# Patient Record
Sex: FEMALE | Race: WHITE | Employment: OTHER | URBAN - METROPOLITAN AREA
[De-identification: names, ages, dates, MRNs, and addresses within clinical notes are randomized per-mention and may not be internally consistent; named-entity substitution may affect disease eponyms.]

---

## 2017-01-02 DIAGNOSIS — F41.1 GAD (GENERALIZED ANXIETY DISORDER): ICD-10-CM

## 2017-01-02 RX ORDER — SERTRALINE HYDROCHLORIDE 100 MG/1
TABLET, FILM COATED ORAL
Qty: 30 TAB | Refills: 0 | Status: SHIPPED | OUTPATIENT
Start: 2017-01-02 | End: 2017-01-30 | Stop reason: SDUPTHER

## 2017-01-30 DIAGNOSIS — F41.1 GAD (GENERALIZED ANXIETY DISORDER): ICD-10-CM

## 2017-01-30 RX ORDER — SERTRALINE HYDROCHLORIDE 100 MG/1
TABLET, FILM COATED ORAL
Qty: 30 TAB | Refills: 0 | Status: SHIPPED | OUTPATIENT
Start: 2017-01-30 | End: 2017-01-31 | Stop reason: SDUPTHER

## 2017-01-31 ENCOUNTER — OFFICE VISIT (OUTPATIENT)
Dept: INTERNAL MEDICINE CLINIC | Age: 62
End: 2017-01-31

## 2017-01-31 VITALS
TEMPERATURE: 99.5 F | HEART RATE: 98 BPM | OXYGEN SATURATION: 98 % | RESPIRATION RATE: 18 BRPM | SYSTOLIC BLOOD PRESSURE: 132 MMHG | WEIGHT: 182.7 LBS | DIASTOLIC BLOOD PRESSURE: 84 MMHG | BODY MASS INDEX: 29.36 KG/M2 | HEIGHT: 66 IN

## 2017-01-31 DIAGNOSIS — R73.03 PREDIABETES: ICD-10-CM

## 2017-01-31 DIAGNOSIS — E78.00 HYPERCHOLESTEREMIA: ICD-10-CM

## 2017-01-31 DIAGNOSIS — F17.200 TOBACCO DEPENDENCE: ICD-10-CM

## 2017-01-31 DIAGNOSIS — F41.1 GAD (GENERALIZED ANXIETY DISORDER): Primary | ICD-10-CM

## 2017-01-31 DIAGNOSIS — R43.2 ABNORMAL TASTE IN MOUTH: ICD-10-CM

## 2017-01-31 LAB — HBA1C MFR BLD HPLC: 6.1 %

## 2017-01-31 RX ORDER — SERTRALINE HYDROCHLORIDE 100 MG/1
TABLET, FILM COATED ORAL
Qty: 90 TAB | Refills: 1 | Status: SHIPPED | OUTPATIENT
Start: 2017-01-31 | End: 2017-04-20 | Stop reason: SDUPTHER

## 2017-01-31 RX ORDER — BISMUTH SUBSALICYLATE 262 MG/1
2 TABLET, CHEWABLE ORAL
COMMUNITY
End: 2017-04-20

## 2017-01-31 NOTE — MR AVS SNAPSHOT
Visit Information Date & Time Provider Department Dept. Phone Encounter #  
 1/31/2017  9:40 AM Sari Casey MD [place] [address] [facility] [phone] [id number] Follow-up Instructions Return in about 6 months (around 7/31/2017) for Annual Physical with new provider. Upcoming Health Maintenance Date Due INFLUENZA AGE 9 TO ADULT 7/1/2017* BREAST CANCER SCRN MAMMOGRAM 8/5/2017 PAP AKA CERVICAL CYTOLOGY 10/15/2018 DTaP/Tdap/Td series (2 - Td) 1/26/2025 COLONOSCOPY 6/5/2025 *Topic was postponed. The date shown is not the original due date. Allergies as of 1/31/2017  Review Complete On: 1/31/2017 By: Yenny Lr No Known Allergies Current Immunizations  Reviewed on 8/22/2016 Name Date Influenza Vaccine (Quad) PF 10/15/2015  9:38 AM  
 Influenza Vaccine PF 10/13/2014, 12/2/2013 12:31 PM  
 Influenza Vaccine Whole 9/8/2009 Pneumococcal Polysaccharide (PPSV-23) 8/22/2016 11:20 AM  
 Tdap 1/26/2015 Zoster Vaccine, Live 4/1/2015 Not reviewed this visit You Were Diagnosed With   
  
 Codes Comments ENIO (generalized anxiety disorder)    -  Primary ICD-10-CM: F41.1 ICD-9-CM: 300.02 Tobacco dependence     ICD-10-CM: D83.150 ICD-9-CM: 305.1 Hypercholesteremia     ICD-10-CM: E78.00 ICD-9-CM: 272.0 Prediabetes     ICD-10-CM: R73.03 
ICD-9-CM: 790.29 Vitals BP Pulse Temp Resp Height(growth percentile) Weight(growth percentile) 132/84 (BP 1 Location: Right arm, BP Patient Position: Sitting) 98 99.5 °F (37.5 °C) (Oral) 18 5' 6\" (1.676 m) 182 lb 11.2 oz (82.9 kg) SpO2 BMI OB Status Smoking Status 98% 29.49 kg/m2 Hysterectomy Current Every Day Smoker Vitals History BMI and BSA Data Body Mass Index Body Surface Area  
 29.49 kg/m 2 1.96 m 2 Preferred Pharmacy Pharmacy Name Phone [phone]  [phone] Your Updated Medication List  
  
   
This list is accurate as of: 1/31/17 11:10 AM.  Always use your most recent med list.  
  
  
  
  
 bismuth subsalicylate 874 mg Chew Commonly known as:  PEPTO-BISMOL Take 2 Tabs by mouth every three (3) hours as needed. fluticasone 50 mcg/actuation nasal spray Commonly known as:  Larson Blizzard 2 Sprays by Both Nostrils route daily. LORazepam 0.5 mg tablet Commonly known as:  ATIVAN Take 1/2 tablet nightly as needed for sleep  
  
 montelukast 10 mg tablet Commonly known as:  SINGULAIR Take 1 Tab by mouth daily. sertraline 100 mg tablet Commonly known as:  ZOLOFT  
TAKE ONE TABLET DAILY. ZANTAC 300 mg tablet Generic drug:  raNITIdine Take 300 mg by mouth daily. Prescriptions Sent to Pharmacy Refills  
 sertraline (ZOLOFT) 100 mg tablet 1 Sig: TAKE ONE TABLET DAILY. Class: Normal  
 Pharmacy: [facility] #: [phone] We Performed the Following AMB POC HEMOGLOBIN A1C [42968 CPT(R)] Follow-up Instructions Return in about 6 months (around 7/31/2017) for Annual Physical with new provider. To-Do List   
 04/25/2017 Lab:  LIPID PANEL   
  
 04/25/2017 Lab:  METABOLIC PANEL, COMPREHENSIVE [facility] HE[facility]! Dear [facility] Records: Thank you for requesting a [employer] account. Our records indicate that you already have an active [employer] account. You can access your account anytime at [email or web address] [email or web address] Did you know that you can access your hospital and ER discharge instructions at any time in [employer]? You can also review all of your test results from your hospital stay or ER visit. Additional Information If you have questions, please visit the Frequently Asked Questions section of the [employer] website at [email or web address] Remember, [email or web address] is NOT to be used for urgent needs. For medical emergencies, dial 911. Now available from your iPhone and Android! Please provide this summary of care documentation to your next provider. Your primary care clinician is listed as Tahira Xiao. If you have any questions after today's visit, please call [phone].

## 2017-01-31 NOTE — PROGRESS NOTES
HPI:  Boris Gordon is a 57y.o. year old female who returns to clinic today for routine follow up appointment to discuss the issues below: Anxiety - Adherent to Zoloft. Takes Ativan 1/2 tab appx once a week to help with sleep and anxiousness related to family stressors. Reports generally anxiety is well controlled. Side effects - concerned with weight gain. Prediabetes and hyperlipidemia - hasn't been watching diet very closely but does eat generally healthy foods. She is swimming regularly for exercise. Concerns - reports a sour taste in her mouth by the end of day. Diet changes have not helped, pepto bismal has. Also tried Zantac and two weeks of Prilosec with no improvement. Denies epigastric discomfort. Endorses post nasal drip. Tobacco use - appx 1/2 ppd. Prior to Admission medications    Medication Sig Start Date End Date Taking? Authorizing Provider   bismuth subsalicylate (PEPTO-BISMOL) 262 mg chew Take 2 Tabs by mouth every three (3) hours as needed. Yes Historical Provider   sertraline (ZOLOFT) 100 mg tablet TAKE ONE TABLET DAILY. 1/30/17  Yes [address] [place] [address], MD   montelukast (SINGULAIR) 10 mg tablet Take 1 Tab by mouth daily. 9/26/16  Yes DARON Meza   LORazepam (ATIVAN) 0.5 mg tablet Take 1/2 tablet nightly as needed for sleep 9/26/16  Yes DARON Meza   fluticasone (FLONASE) 50 mcg/actuation nasal spray 2 Sprays by Both Nostrils route daily. 8/22/16  Yes DARON Meza          No Known Allergies        Review of Systems   Constitutional: Negative for chills, fever and malaise/fatigue. HENT: Negative for congestion. Respiratory: Negative for cough, shortness of breath and wheezing. Cardiovascular: Negative for chest pain, palpitations and leg swelling. Gastrointestinal: Negative for abdominal pain, blood in stool and heartburn. Musculoskeletal: Negative for falls, joint pain and myalgias.    Neurological: Negative for dizziness and headaches. Physical Exam   Constitutional: She appears well-nourished. Neck: Carotid bruit is not present. Cardiovascular: Normal rate, regular rhythm and normal heart sounds. No murmur heard. Pulses:       Carotid pulses are 2+ on the right side, and 2+ on the left side. Pulmonary/Chest: Effort normal and breath sounds normal.   Abdominal: Soft. Bowel sounds are normal. There is no hepatosplenomegaly. There is no tenderness. Musculoskeletal: She exhibits no edema. Psychiatric: She has a normal mood and affect. Her behavior is normal.         Visit Vitals    /84 (BP 1 Location: Right arm, BP Patient Position: Sitting)    Pulse 98    Temp 99.5 °F (37.5 °C) (Oral)    Resp 18    Ht 5' 6\" (1.676 m)    Wt 182 lb 11.2 oz (82.9 kg)    SpO2 98%    BMI 29.49 kg/m2         Assessment & Plan:  Boris Gordon was seen today for blood sugar problem, heartburn and cyst.    Diagnoses and all orders for this visit:    ENIO (generalized anxiety disorder)  I evaluated and recommended to continue current doses of medications.    -     sertraline (ZOLOFT) 100 mg tablet; TAKE ONE TABLET DAILY. Tobacco dependence  The patient was counseled on the dangers of tobacco use, and was advised to quit. Reviewed strategies to maximize success, including stress management and support of family/friends. Hypercholesteremia  Discussed historically elevated cholesterol and the increased risk being a tobacco user poses on risk of heart disease and stroke. I recommend initiation of statin therapy, she agrees. Will recheck her fasting lipids in 12 weeks. -     LIPID PANEL; Future  -     METABOLIC PANEL, COMPREHENSIVE; Future    Prediabetes  -     AMB POC HEMOGLOBIN A1C    Abnormal taste in mouth  Silent reflux vs post nasal drip. I recommend another trial of PPI - Nexium 20 mg daily 30 min prior to breakfast.  If no improvement in 4 weeks, will target post nasal drip.        Follow-up Disposition:  Return in about 6 months (around 7/31/2017) for Annual Physical with new provider. Advised her to call back or return to office if symptoms worsen/change/persist.  Discussed expected course/resolution/complications of diagnosis in detail with patient. Medication risks/benefits/costs/interactions/alternatives discussed with patient. She was given an after visit summary which includes diagnoses, current medications, & vitals. She expressed understanding with the diagnosis and plan.

## 2017-01-31 NOTE — PROGRESS NOTES
Megha Yepez is a 57 y.o. female  Chief Complaint   Patient presents with    Blood sugar problem    Heartburn     Sour stomach    Cyst     right axilla X3 yr     1. Have you been to the ER, urgent care clinic since your last visit? Hospitalized since your last visit? No    2. Have you seen or consulted any other health care providers outside of the [address] [place] [address] since your last visit? Include any pap smears or colon screening.  No

## 2017-02-08 ENCOUNTER — TELEPHONE (OUTPATIENT)
Dept: INTERNAL MEDICINE CLINIC | Age: 62
End: 2017-02-08

## 2017-02-08 NOTE — TELEPHONE ENCOUNTER
----- Message from Nciole Hearn sent at 2/8/2017  1:23 PM EST -----  Regarding: Dr. Bradley Emanuel  Pt states she was expecting a rx for a Statin to be called in to [address] [place] [address] on 1-31-17 ( [phone]). She is not sure of name of the medication. She is requesting a call today. She can be reached at  [phone].

## 2017-02-09 RX ORDER — ATORVASTATIN CALCIUM 20 MG/1
20 TABLET, FILM COATED ORAL DAILY
Qty: 90 TAB | Refills: 0 | Status: SHIPPED | OUTPATIENT
Start: 2017-02-09 | End: 2017-05-12 | Stop reason: SDUPTHER

## 2017-02-09 NOTE — TELEPHONE ENCOUNTER
Spoke to pt - she was advised of Dr. WITT's message and verbalized understanding. Pt was also made aware of needing to have her labs done.

## 2017-02-09 NOTE — TELEPHONE ENCOUNTER
I do not see a \"statin\" documented in the chart from the pt's previous appointment. Please advise.

## 2017-02-09 NOTE — TELEPHONE ENCOUNTER
She's right, I lapsed and did not send it in. Please apologize for me. I sent it in to [address] today. Check labs in 12 weeks (already ordered).

## 2017-04-20 ENCOUNTER — OFFICE VISIT (OUTPATIENT)
Dept: INTERNAL MEDICINE CLINIC | Age: 62
End: 2017-04-20

## 2017-04-20 VITALS
OXYGEN SATURATION: 97 % | HEIGHT: 66 IN | TEMPERATURE: 98.9 F | BODY MASS INDEX: 29.41 KG/M2 | WEIGHT: 183 LBS | RESPIRATION RATE: 16 BRPM | HEART RATE: 73 BPM

## 2017-04-20 DIAGNOSIS — R73.01 IFG (IMPAIRED FASTING GLUCOSE): ICD-10-CM

## 2017-04-20 DIAGNOSIS — Z12.39 BREAST CANCER SCREENING: ICD-10-CM

## 2017-04-20 DIAGNOSIS — E78.00 HYPERCHOLESTEREMIA: ICD-10-CM

## 2017-04-20 DIAGNOSIS — R22.31 AXILLARY LUMP, RIGHT: Primary | ICD-10-CM

## 2017-04-20 DIAGNOSIS — F41.1 GAD (GENERALIZED ANXIETY DISORDER): ICD-10-CM

## 2017-04-20 RX ORDER — SERTRALINE HYDROCHLORIDE 100 MG/1
150 TABLET, FILM COATED ORAL DAILY
Qty: 135 TAB | Refills: 1 | Status: SHIPPED | OUTPATIENT
Start: 2017-04-20 | End: 2018-02-15 | Stop reason: SDUPTHER

## 2017-04-20 RX ORDER — MELATONIN
2000 2 TIMES DAILY
COMMUNITY

## 2017-04-20 RX ORDER — LORAZEPAM 0.5 MG/1
TABLET ORAL
Qty: 30 TAB | Refills: 0 | Status: SHIPPED | OUTPATIENT
Start: 2017-04-20 | End: 2018-03-02

## 2017-04-20 RX ORDER — HYDROGEN PEROXIDE 3 %
SOLUTION, NON-ORAL MISCELLANEOUS DAILY
COMMUNITY
End: 2018-03-02

## 2017-04-20 NOTE — PATIENT INSTRUCTIONS

## 2017-04-20 NOTE — MR AVS SNAPSHOT
Visit Information Date & Time Provider Department Dept. Phone Encounter #  
 4/20/2017  8:40 AM Luis Antonio Ortiz MD [place] [address] [facility] [phone] [id number] Follow-up Instructions Return for Keep Appt in June with Dr. Arvind Muir. Your Appointments 6/30/2017 10:00 AM  
New Patient with Anuradha Aguilar MD  
[facility] Kellyselene Cook) Appt Note: to establish  
 [address] [facility] [address]  
[id number] [address] Upcoming Health Maintenance Date Due  
 BREAST CANCER SCRN MAMMOGRAM 8/5/2017 INFLUENZA AGE 9 TO ADULT 7/1/2017* PAP AKA CERVICAL CYTOLOGY 10/15/2018 DTaP/Tdap/Td series (2 - Td) 1/26/2025 COLONOSCOPY 6/5/2025 *Topic was postponed. The date shown is not the original due date. Allergies as of 4/20/2017  Review Complete On: 4/20/2017 By: Luis Antonio Ortiz MD  
 No Known Allergies Current Immunizations  Reviewed on 8/22/2016 Name Date Influenza Vaccine (Quad) PF 10/15/2015  9:38 AM  
 Influenza Vaccine PF 10/13/2014, 12/2/2013 12:31 PM  
 Influenza Vaccine Whole 9/8/2009 Pneumococcal Polysaccharide (PPSV-23) 8/22/2016 11:20 AM  
 Tdap 1/26/2015 Zoster Vaccine, Live 4/1/2015 Not reviewed this visit You Were Diagnosed With   
  
 Codes Comments Axillary lump, right    -  Primary ICD-10-CM: R22.31 
ICD-9-CM: 782.2 ENIO (generalized anxiety disorder)     ICD-10-CM: F41.1 ICD-9-CM: 300.02 Hypercholesteremia     ICD-10-CM: E78.00 ICD-9-CM: 272.0 IFG (impaired fasting glucose)     ICD-10-CM: R73.01 
ICD-9-CM: 790.21 Breast cancer screening     ICD-10-CM: Z12.39 
ICD-9-CM: V76.10 Vitals Pulse Temp Resp Height(growth percentile) Weight(growth percentile) SpO2  
 73 98.9 °F (37.2 °C) (Oral) 16 5' 6\" (1.676 m) 183 lb (83 kg) 97% BMI OB Status Smoking Status 29.54 kg/m2 Hysterectomy Current Every Day Smoker Vitals History BMI and BSA Data Body Mass Index Body Surface Area  
 29.54 kg/m 2 1.97 m 2 Preferred Pharmacy Pharmacy Name Phone [facility] [address] [facility] [phone] Your Updated Medication List  
  
   
This list is accurate as of: 4/20/17  9:11 AM.  Always use your most recent med list.  
  
  
  
  
 atorvastatin 20 mg tablet Commonly known as:  LIPITOR Take 1 Tab by mouth daily. KRILL OIL PO Take  by mouth. LORazepam 0.5 mg tablet Commonly known as:  ATIVAN Take 1/2 tablet nightly as needed for sleep  
  
 montelukast 10 mg tablet Commonly known as:  SINGULAIR Take 1 Tab by mouth daily. NexIUM 20 mg capsule Generic drug:  esomeprazole Take  by mouth daily. sertraline 100 mg tablet Commonly known as:  ZOLOFT Take 1.5 Tabs by mouth daily. VITAMIN D3 1,000 unit tablet Generic drug:  cholecalciferol Take  by mouth two (2) times a day. Prescriptions Printed Refills LORazepam (ATIVAN) 0.5 mg tablet 0 Sig: Take 1/2 tablet nightly as needed for sleep Class: Print Prescriptions Sent to Pharmacy Refills  
 sertraline (ZOLOFT) 100 mg tablet 1 Sig: Take 1.5 Tabs by mouth daily. Class: Normal  
 Pharmacy: [facility] [address] [facility] Ph #: [phone] Route: Oral  
  
We Performed the Following HEMOGLOBIN A1C WITH EAG [18785 CPT(R)] Follow-up Instructions Return for Keep Appt in June with Dr. Rigo Pires. To-Do List   
 04/21/2017 Imaging:  [facility] MAMMO BI SCREENING INCL CAD Patient Instructions Anxiety Disorder: Care Instructions Your Care Instructions Anxiety is a normal reaction to stress. Difficult situations can cause you to have symptoms such as sweaty palms and a nervous feeling. In an anxiety disorder, the symptoms are far more severe. Constant worry, muscle tension, trouble sleeping, nausea and diarrhea, and other symptoms can make normal daily activities difficult or impossible. These symptoms may occur for no reason, and they can affect your work, school, or social life. Medicines, counseling, and self-care can all help. Follow-up care is a key part of your treatment and safety. Be sure to make and go to all appointments, and call your doctor if you are having problems. It's also a good idea to know your test results and keep a list of the medicines you take. How can you care for yourself at home? · Take medicines exactly as directed. Call your doctor if you think you are having a problem with your medicine. · Go to your counseling sessions and follow-up appointments. · Recognize and accept your anxiety. Then, when you are in a situation that makes you anxious, say to yourself, \"This is not an emergency. I feel uncomfortable, but I am not in danger. I can keep going even if I feel anxious. \" · Be kind to your body: ¨ Relieve tension with exercise or a massage. ¨ Get enough rest. 
¨ Avoid alcohol, caffeine, nicotine, and illegal drugs. They can increase your anxiety level and cause sleep problems. ¨ Learn and do relaxation techniques. See below for more about these techniques. · Engage your mind. Get out and do something you enjoy. Go to a funny movie, or take a walk or hike. Plan your day. Having too much or too little to do can make you anxious. · Keep a record of your symptoms. Discuss your fears with a good friend or family member, or join a support group for people with similar problems. Talking to others sometimes relieves stress. · Get involved in social groups, or volunteer to help others. Being alone sometimes makes things seem worse than they are.  
· Get at least 30 minutes of exercise on most days of the week to relieve stress. Walking is a good choice. You also may want to do other activities, such as running, swimming, cycling, or playing tennis or team sports. Relaxation techniques Do relaxation exercises 10 to 20 minutes a day. You can play soothing, relaxing music while you do them, if you wish. · Tell others in your house that you are going to do your relaxation exercises. Ask them not to disturb you. · Find a comfortable place, away from all distractions and noise. · Lie down on your back, or sit with your back straight. · Focus on your breathing. Make it slow and steady. · Breathe in through your nose. Breathe out through either your nose or mouth. · Breathe deeply, filling up the area between your navel and your rib cage. Breathe so that your belly goes up and down. · Do not hold your breath. · Breathe like this for 5 to 10 minutes. Notice the feeling of calmness throughout your whole body. As you continue to breathe slowly and deeply, relax by doing the following for another 5 to 10 minutes: · Tighten and relax each muscle group in your body. You can begin at your toes and work your way up to your head. · Imagine your muscle groups relaxing and becoming heavy. · Empty your mind of all thoughts. · Let yourself relax more and more deeply. · Become aware of the state of calmness that surrounds you. · When your relaxation time is over, you can bring yourself back to alertness by moving your fingers and toes and then your hands and feet and then stretching and moving your entire body. Sometimes people fall asleep during relaxation, but they usually wake up shortly afterward. · Always give yourself time to return to full alertness before you drive a car or do anything that might cause an accident if you are not fully alert. Never play a relaxation tape while you drive a car. When should you call for help? Call 911 anytime you think you may need emergency care. For example, call if: · You feel you cannot stop from hurting yourself or someone else. Keep the numbers for these national suicide hotlines: 8-779-684-TALK (6-793.922.2315) and 0-495-VDODYPT (5-245.416.4287). If you or someone you know talks about suicide or feeling hopeless, get help right away. Watch closely for changes in your health, and be sure to contact your doctor if: 
· You have anxiety or fear that affects your life. · You have symptoms of anxiety that are new or different from those you had before. Where can you learn more? Go to http://zaid-ruddy.info/. Enter P754 in the search box to learn more about \"Anxiety Disorder: Care Instructions. \" Current as of: July 26, 2016 Content Version: 11.2 © 8285-2638 [employer]. Care instructions adapted under license by [employer] (which disclaims liability or warranty for this information). If you have questions about a medical condition or this instruction, always ask your healthcare professional. [address] any warranty or liability for your use of this information. Introducing [facility] & HEALTH SERVICES! Dear [facility] Thank you for requesting a [employer] account. Our records indicate that you already have an active [employer] account. You can access your account anytime at [email or web address] Did you know that you can access your hospital and ER discharge instructions at any time in [employer]? You can also review all of your test results from your hospital stay or ER visit. Additional Information If you have questions, please visit the Frequently Asked Questions section of the [employer] website at [email or web address] Remember, [employer] is NOT to be used for urgent needs. For medical emergencies, dial 911. Now available from your iPhone and Android! Please provide this summary of care documentation to your next provider. Your primary care clinician is listed as Ijeoma Kaba. If you have any questions after today's visit, please call [phone].

## 2017-04-20 NOTE — PROGRESS NOTES
Chief Complaint   Patient presents with    Medication Evaluation     Reviewed record in preparation for visit and have obtained necessary documentation. Identified pt with two pt identifiers(name and ). Health Maintenance Due   Topic    BREAST CANCER SCRN MAMMOGRAM          Chief Complaint   Patient presents with    Medication Evaluation        Wt Readings from Last 3 Encounters:   17 183 lb (83 kg)   17 182 lb 11.2 oz (82.9 kg)   16 171 lb (77.6 kg)     Temp Readings from Last 3 Encounters:   17 98.9 °F (37.2 °C) (Oral)   17 99.5 °F (37.5 °C) (Oral)   16 98.9 °F (37.2 °C) (Oral)     BP Readings from Last 3 Encounters:   17 120/70   17 132/84   16 126/80     Pulse Readings from Last 3 Encounters:   17 73   17 98   16 85           Learning Assessment:  :     Learning Assessment 2015   PRIMARY LEARNER Patient Patient   HIGHEST LEVEL OF EDUCATION - PRIMARY LEARNER  4 YEARS OF COLLEGE 4 YEARS OF COLLEGE   BARRIERS PRIMARY LEARNER NONE NONE   CO-LEARNER CAREGIVER No No   PRIMARY LANGUAGE ENGLISH ENGLISH    NEED No No   LEARNER PREFERENCE PRIMARY LISTENING DEMONSTRATION     DEMONSTRATION PICTURES   LEARNING SPECIAL TOPICS no no   ANSWERED BY patient patient   RELATIONSHIP SELF SELF       Depression Screening:  :     No flowsheet data found. Fall Risk Assessment:  :     No flowsheet data found. Abuse Screening:  :     Abuse Screening Questionnaire 10/15/2015 2014   Do you ever feel afraid of your partner? N N   Are you in a relationship with someone who physically or mentally threatens you? N N   Is it safe for you to go home?  Y Y       Coordination of Care Questionnaire:  :     1) Have you been to an emergency room, urgent care clinic since your last visit? no   Hospitalized since your last visit? no             2) Have you seen or consulted any other health care providers outside of [facility] since your last visit? no  (Include any pap smears or colon screenings in this section.)    3) Do you have an Advance Directive on file? yes    4) Are you interested in receiving information on Advance Directives? NO      Patient is accompanied by self I have received verbal consent from Dexter Roach to discuss any/all medical information while they are present in the room. Reviewed record  In preparation for visit and have obtained necessary documentation.

## 2017-04-20 NOTE — PROGRESS NOTES
HPI:  Zully Barba is a 57y.o. year old female who returns to clinic today for an acute visit to discuss the problem(s) below:    1. Continues to note a lymph node in right axilla. US of axilla done in July 2014 w/ her mammogram did not show any abnormalities. She denies any enlargement, pain or change in self breast exam.    2.  She notes Zoloft was losing its effect. One week ago she upped her dose to 1 1/2 (150 mg) daily. She has not increased Ativan use (1/2 tab 0-3 nights per week) - requests refill. Changed to sertraline in Jan 2015, several years prior on different SSRIs. She feels overall she does well on this drug. 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 3.  HLD. Started statin after Jan visit. Has tolerated well w/o side effects. Prior to Admission medications    Medication Sig Start Date End Date Taking? Authorizing Provider   esomeprazole (NEXIUM) 20 mg capsule Take  by mouth daily. Yes Historical Provider   KRILL OIL PO Take  by mouth. Yes Historical Provider   cholecalciferol (VITAMIN D3) 1,000 unit tablet Take  by mouth two (2) times a day. Yes Historical Provider   atorvastatin (LIPITOR) 20 mg tablet Take 1 Tab by mouth daily. 2/9/17  Yes Luis Antonio Ortiz MD   sertraline (ZOLOFT) 100 mg tablet TAKE ONE TABLET DAILY. 1/31/17  Yes Luis Antonio Ortiz MD   LORazepam (ATIVAN) 0.5 mg tablet Take 1/2 tablet nightly as needed for sleep 9/26/16  Yes DARON Momin   montelukast (SINGULAIR) 10 mg tablet Take 1 Tab by mouth daily. 9/26/16   DARON Clifton          No Known Allergies        ROS  See HPI      Physical Exam   Constitutional: She appears well-developed and well-nourished. No distress. Lymphadenopathy:     She has no axillary adenopathy. Right axilla was palpated and no abnormalities were appreciated. Psychiatric: She has a normal mood and affect.  Her behavior is normal.         Visit Vitals    Pulse 73    Temp 98.9 °F (37.2 °C) (Oral)    Resp 16    Ht 5' 6\" (1.676 m)    Wt 183 lb (83 kg)    SpO2 97%    BMI 29.54 kg/m2         Assessment & Plan:  Myrna Rick was seen today for medication evaluation. Diagnoses and all orders for this visit:    Axillary lump, right  No abnormality appreciated on exam.  I encouraged her to have her mammogram done. ENIO (generalized anxiety disorder)  Continue increased dose of Zoloft of 150 mg. Renewed Ativan for infrequent prn use. -     sertraline (ZOLOFT) 100 mg tablet; Take 1.5 Tabs by mouth daily.  -     LORazepam (ATIVAN) 0.5 mg tablet; Take 1/2 tablet nightly as needed for sleep    Hypercholesteremia  I evaluated and recommended to continue current doses of medications pending lab work. Have fasting lipids drawn today. IFG (impaired fasting glucose)  -     HEMOGLOBIN A1C WITH EAG    Breast cancer screening  -     [facility]UI MAMMO BI SCREENING INCL CAD; Future         Follow-up Disposition:  Return for Keep Appt in June with Dr. Wes Shoemaker. Advised her to call back or return to office if symptoms worsen/change/persist.  Discussed expected course/resolution/complications of diagnosis in detail with patient. Medication risks/benefits/costs/interactions/alternatives discussed with patient. She was given an after visit summary which includes diagnoses, current medications, & vitals. She expressed understanding with the diagnosis and plan.

## 2017-04-21 LAB
ALBUMIN SERPL-MCNC: 4 G/DL (ref 3.6–4.8)
ALBUMIN/GLOB SERPL: 1.8 {RATIO} (ref 1.2–2.2)
ALP SERPL-CCNC: 89 IU/L (ref 39–117)
ALT SERPL-CCNC: 13 IU/L (ref 0–32)
AST SERPL-CCNC: 13 IU/L (ref 0–40)
BILIRUB SERPL-MCNC: 0.3 MG/DL (ref 0–1.2)
BUN SERPL-MCNC: 15 MG/DL (ref 8–27)
BUN/CREAT SERPL: 16 (ref 12–28)
CALCIUM SERPL-MCNC: 9.4 MG/DL (ref 8.7–10.3)
CHLORIDE SERPL-SCNC: 107 MMOL/L (ref 96–106)
CHOLEST SERPL-MCNC: 149 MG/DL (ref 100–199)
CO2 SERPL-SCNC: 25 MMOL/L (ref 18–29)
CREAT SERPL-MCNC: 0.94 MG/DL (ref 0.57–1)
EST. AVERAGE GLUCOSE BLD GHB EST-MCNC: 134 MG/DL
GLOBULIN SER CALC-MCNC: 2.2 G/DL (ref 1.5–4.5)
GLUCOSE SERPL-MCNC: 106 MG/DL (ref 65–99)
HBA1C MFR BLD: 6.3 % (ref 4.8–5.6)
HDLC SERPL-MCNC: 56 MG/DL
INTERPRETATION, 910389: NORMAL
LDLC SERPL CALC-MCNC: 74 MG/DL (ref 0–99)
POTASSIUM SERPL-SCNC: 5.1 MMOL/L (ref 3.5–5.2)
PROT SERPL-MCNC: 6.2 G/DL (ref 6–8.5)
SODIUM SERPL-SCNC: 145 MMOL/L (ref 134–144)
TRIGL SERPL-MCNC: 93 MG/DL (ref 0–149)
VLDLC SERPL CALC-MCNC: 19 MG/DL (ref 5–40)

## 2017-04-25 DIAGNOSIS — E78.00 HYPERCHOLESTEREMIA: ICD-10-CM

## 2017-04-28 NOTE — PROGRESS NOTES
The following [employer] message was sent to patient:    Yes, your cholesterol looks great! The atorvastatin is working well. Continue this dose. Your HgA1c (3 month blood sugar average) is up slightly to 6.4 %. Remember a diagnosis of diabetes is > 6.5 %. Please continue to follow a low carbohydrate / sugar diet and keep up with regular exercise.

## 2017-05-12 RX ORDER — ATORVASTATIN CALCIUM 20 MG/1
TABLET, FILM COATED ORAL
Qty: 90 TAB | Refills: 3 | Status: SHIPPED | OUTPATIENT
Start: 2017-05-12 | End: 2017-08-23 | Stop reason: SINTOL

## 2017-06-16 ENCOUNTER — HOSPITAL ENCOUNTER (OUTPATIENT)
Dept: MAMMOGRAPHY | Age: 62
Discharge: HOME | End: 2017-06-16
Attending: INTERNAL MEDICINE
Payer: COMMERCIAL

## 2017-06-16 DIAGNOSIS — Z12.31 VISIT FOR SCREENING MAMMOGRAM: ICD-10-CM

## 2017-06-16 PROCEDURE — 77067 SCR MAMMO BI INCL CAD: CPT

## 2017-08-08 ENCOUNTER — TELEPHONE (OUTPATIENT)
Dept: INTERNAL MEDICINE CLINIC | Age: 62
End: 2017-08-08

## 2017-08-08 NOTE — TELEPHONE ENCOUNTER
Pt's brother  Julieta Baca call today stating his sister Dipti Lake has a fever of 108 and an appt was scheduled for today at 320 pm and the pt decline the appt she decided to wait  Until tomorrow any question call Julieta Baca at [phone]

## 2017-08-23 ENCOUNTER — OFFICE VISIT (OUTPATIENT)
Dept: INTERNAL MEDICINE CLINIC | Age: 62
End: 2017-08-23

## 2017-08-23 VITALS
TEMPERATURE: 98.4 F | SYSTOLIC BLOOD PRESSURE: 112 MMHG | BODY MASS INDEX: 27.9 KG/M2 | HEIGHT: 66 IN | OXYGEN SATURATION: 97 % | WEIGHT: 173.6 LBS | DIASTOLIC BLOOD PRESSURE: 80 MMHG | HEART RATE: 76 BPM

## 2017-08-23 DIAGNOSIS — E55.9 VITAMIN D DEFICIENCY: ICD-10-CM

## 2017-08-23 DIAGNOSIS — F17.200 TOBACCO DEPENDENCE: ICD-10-CM

## 2017-08-23 DIAGNOSIS — Z00.00 ROUTINE GENERAL MEDICAL EXAMINATION AT A HEALTH CARE FACILITY: ICD-10-CM

## 2017-08-23 DIAGNOSIS — R73.03 PREDIABETES: ICD-10-CM

## 2017-08-23 DIAGNOSIS — F41.1 GAD (GENERALIZED ANXIETY DISORDER): ICD-10-CM

## 2017-08-23 DIAGNOSIS — E78.2 MIXED HYPERLIPIDEMIA: Primary | ICD-10-CM

## 2017-08-23 DIAGNOSIS — Z71.6 ENCOUNTER FOR SMOKING CESSATION COUNSELING: ICD-10-CM

## 2017-08-23 RX ORDER — BUPROPION HYDROCHLORIDE 150 MG/1
150 TABLET, EXTENDED RELEASE ORAL 2 TIMES DAILY
Qty: 60 TAB | Refills: 1 | Status: SHIPPED | OUTPATIENT
Start: 2017-08-23 | End: 2018-01-24 | Stop reason: SDUPTHER

## 2017-08-23 NOTE — PATIENT INSTRUCTIONS
It was a pleasure to meet you! As discussed:    I have ordered your age appropriate labs please complete them. You will need to fast 10-12hrs before your lab appointment. Complete labs two weeks before your next appointment. Quitting smoking  I am glad you are thinking about quitting. Start Zyban 1 week before quit date  Set a quit date September 9th, 2017   See www.smokefree. gov for more tips. High Cholesterol  -Your cholesterol is elevated but fortunately based on your risk factors a statin is not indicated. Continue to work on optimizing your weight (goal lose at least 5% body weight), healthy eating and cardiovascular disease (Recommendation: reduce carbs to 150-200g/ day and the Mediterranean Diet). Deciding About Using Medicines To Quit Smoking  What are the medicines you can use? Your doctor may prescribe varenicline (Chantix) or bupropion (Zyban). These medicines can help you cope with cravings for tobacco. They are pills that don't contain nicotine. You also can use nicotine replacement products. These do contain nicotine. There are many types. · Gum and lozenges slowly release nicotine into your mouth. · Patches stick to your skin. They slowly release nicotine into your bloodstream.  · An inhaler has a rangel that contains nicotine. You breathe in a puff of nicotine vapor through your mouth and throat. · Nasal spray releases a mist that contains nicotine. What are key points about this decision? · Using medicines can double your chances of quitting smoking. They can ease cravings and withdrawal symptoms. · Getting counseling along with using medicine can raise your chances of quitting even more. · If you smoke fewer than 5 cigarettes a day, you may not need medicines to help you quit smoking. · These medicines have less nicotine than cigarettes. And by itself, nicotine is not nearly as harmful as smoking.  The tars, carbon monoxide, and other toxic chemicals in tobacco cause the harmful effects. · The side effects of nicotine replacement products depend on the type of product. For example, a patch can make your skin red and itchy. Medicines in pill form can make you sick to your stomach. They can also cause dry mouth and trouble sleeping. For most people, the side effects are not bad enough to make them stop using the products. · FDA warning. The U.S. Food and Drug Administration (FDA) warns that people who are taking bupropion or varenicline and who have any serious or unusual changes in mood or behavior or who feel like hurting themselves or someone else should stop taking the medicine and call a doctor right away. If you already have a mood or behavior problem, be sure to tell your doctor before you decide to use these medicines. Why might you choose to use medicines to quit smoking? · You have tried on your own to stop smoking, but you were not able to stop. · You smoke more than 5 cigarettes a day. · You want to increase your chances of quitting smoking. · You want to reduce your cravings and withdrawal symptoms. · You feel the benefits of medicine outweigh the side effects. Why might you choose not to use medicine? · You want to try quitting on your own by stopping all at once (\"cold turkey\"). · You want to cut back slowly on the number of cigarettes you smoke. · You smoke fewer than 5 cigarettes a day. · You do not like using medicine. · You feel the side effects of medicines outweigh the benefits. · You are worried about the cost of medicines. Your decision  Thinking about the facts and your feelings can help you make a decision that is right for you. Be sure you understand the benefits and risks of your options, and think about what else you need to do before you make the decision. Where can you learn more? Go to http://zaid-ruddy.info/.   Enter V899 in the search box to learn more about \"Deciding About Using Medicines To Quit Smoking. \"  Current as of: March 20, 2017  Content Version: 11.3  © 2116-7112 [employer]. Care instructions adapted under license by [employer] (which disclaims liability or warranty for this information). If you have questions about a medical condition or this instruction, always ask your healthcare professional. [address] any warranty or liability for your use of this information. Learning About the [address] Diet  What is the Mediterranean diet? The Mediterranean diet is a style of eating rather than a diet plan. It features foods eaten in [place], [place], [place], and other countries along the [facility]. It emphasizes eating foods like fish, fruits, vegetables, beans, high-fiber breads and whole grains, nuts, and olive oil. This style of eating includes limited red meat, cheese, and sweets. Why choose the Mediterranean diet? A Mediterranean-style diet may improve heart health. It contains more fat than other heart-healthy diets. But the fats are mainly from nuts, unsaturated oils (such as fish oils and olive oil), and certain nut or seed oils (such as canola, soybean, or flaxseed oil). These fats may help protect the heart and blood vessels. How can you get started on the Mediterranean diet? Here are some things you can do to switch to a more Mediterranean way of eating. What to eat  · Eat a variety of fruits and vegetables each day, such as grapes, blueberries, tomatoes, broccoli, peppers, figs, olives, spinach, eggplant, beans, lentils, and chickpeas. · Eat a variety of whole-grain foods each day, such as oats, brown rice, and whole wheat bread, pasta, and couscous. · Eat fish at least 2 times a week. Try tuna, salmon, mackerel, lake trout, herring, or sardines. · Eat moderate amounts of low-fat dairy products, such as milk, cheese, or yogurt. · Eat moderate amounts of poultry and eggs.   · Choose healthy (unsaturated) fats, such as nuts, olive oil, and certain nut or seed oils like canola, soybean, and flaxseed. · Limit unhealthy (saturated) fats, such as butter, palm oil, and coconut oil. And limit fats found in animal products, such as meat and dairy products made with whole milk. Try to eat red meat only a few times a month in very small amounts. · Limit sweets and desserts to only a few times a week. This includes sugar-sweetened drinks like soda. The Mediterranean diet may also include red wine with your meal--1 glass each day for women and up to 2 glasses a day for men. Tips for eating at home  · Use herbs, spices, garlic, lemon zest, and citrus juice instead of salt to add flavor to foods. · Add avocado slices to your sandwich instead of lincoln. · Have fish for lunch or dinner instead of red meat. Brush the fish with olive oil, and broil or grill it. · Sprinkle your salad with seeds or nuts instead of cheese. · Cook with olive or canola oil instead of butter or oils that are high in saturated fat. · Switch from 2% milk or whole milk to 1% or fat-free milk. · Dip raw vegetables in a vinaigrette dressing or hummus instead of dips made from mayonnaise or sour cream.  · Have a piece of fruit for dessert instead of a piece of cake. Try baked apples, or have some dried fruit. Tips for eating out  · Try broiled, grilled, baked, or poached fish instead of having it fried or breaded. · Ask your  to have your meals prepared with olive oil instead of butter. · Order dishes made with marinara sauce or sauces made from olive oil. Avoid sauces made from cream or mayonnaise. · Choose whole-grain breads, whole wheat pasta and pizza crust, brown rice, beans, and lentils. · Cut back on butter or margarine on bread. Instead, you can dip your bread in a small amount of olive oil. · Ask for a side salad or grilled vegetables instead of french fries or chips. Where can you learn more?   Go to http://zaid-ruddy.info/. Enter [phone] in the search box to learn more about \"Learning About the Mediterranean Diet. \"  Current as of: December 29, 2016  Content Version: 11.3  © 9295-8595 [employer]. Care instructions adapted under license by [employer] (which disclaims liability or warranty for this information). If you have questions about a medical condition or this instruction, always ask your healthcare professional. [address] any warranty or liability for your use of this information.
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
## 2017-08-23 NOTE — PROGRESS NOTES
Chief Complaint   Patient presents with   [facility] Establish Care     1. Have you been to the ER, urgent care clinic since your last visit? Hospitalized since your last visit? Yes When: month ago Where: [facility] Reason for visit: leg sprain    2. Have you seen or consulted any other health care providers outside of the [address] [place] [address] since your last visit? Include any pap smears or colon screening.  No    Stopped atorvastatin, cause muscle ache

## 2017-08-24 ENCOUNTER — TELEPHONE (OUTPATIENT)
Dept: INTERNAL MEDICINE CLINIC | Age: 62
End: 2017-08-24

## 2018-01-22 DIAGNOSIS — Z71.6 ENCOUNTER FOR SMOKING CESSATION COUNSELING: ICD-10-CM

## 2018-01-22 DIAGNOSIS — Z00.00 ROUTINE GENERAL MEDICAL EXAMINATION AT A HEALTH CARE FACILITY: ICD-10-CM

## 2018-01-22 RX ORDER — BUPROPION HYDROCHLORIDE 150 MG/1
150 TABLET, EXTENDED RELEASE ORAL 2 TIMES DAILY
Qty: 60 TAB | Refills: 1 | Status: CANCELLED | OUTPATIENT
Start: 2018-01-22

## 2018-01-24 DIAGNOSIS — Z72.0 TOBACCO ABUSE: ICD-10-CM

## 2018-01-24 DIAGNOSIS — Z71.6 ENCOUNTER FOR SMOKING CESSATION COUNSELING: ICD-10-CM

## 2018-01-24 DIAGNOSIS — F17.200 TOBACCO DEPENDENCE: Primary | ICD-10-CM

## 2018-01-24 RX ORDER — BUPROPION HYDROCHLORIDE 300 MG/1
300 TABLET ORAL
Qty: 30 TAB | Refills: 4 | Status: SHIPPED | OUTPATIENT
Start: 2018-01-24 | End: 2018-08-22 | Stop reason: SDUPTHER

## 2018-01-24 RX ORDER — BUPROPION HYDROCHLORIDE 300 MG/1
300 TABLET ORAL
Qty: 30 TAB | Refills: 1 | Status: CANCELLED | OUTPATIENT
Start: 2018-01-24

## 2018-02-15 DIAGNOSIS — F41.1 GAD (GENERALIZED ANXIETY DISORDER): ICD-10-CM

## 2018-02-15 RX ORDER — SERTRALINE HYDROCHLORIDE 100 MG/1
150 TABLET, FILM COATED ORAL DAILY
Qty: 135 TAB | Refills: 0 | Status: SHIPPED | OUTPATIENT
Start: 2018-02-15 | End: 2018-05-08 | Stop reason: SDUPTHER

## 2018-02-27 LAB
25(OH)D3+25(OH)D2 SERPL-MCNC: 28.5 NG/ML (ref 30–100)
ALBUMIN SERPL-MCNC: 4.1 G/DL (ref 3.6–4.8)
ALBUMIN/GLOB SERPL: 1.9 {RATIO} (ref 1.2–2.2)
ALP SERPL-CCNC: 83 IU/L (ref 39–117)
ALT SERPL-CCNC: 15 IU/L (ref 0–32)
AST SERPL-CCNC: 11 IU/L (ref 0–40)
BASOPHILS # BLD AUTO: 0 X10E3/UL (ref 0–0.2)
BASOPHILS NFR BLD AUTO: 0 %
BILIRUB SERPL-MCNC: 0.3 MG/DL (ref 0–1.2)
BUN SERPL-MCNC: 19 MG/DL (ref 8–27)
BUN/CREAT SERPL: 17 (ref 12–28)
CALCIUM SERPL-MCNC: 9 MG/DL (ref 8.7–10.3)
CHLORIDE SERPL-SCNC: 104 MMOL/L (ref 96–106)
CHOLEST SERPL-MCNC: 222 MG/DL (ref 100–199)
CO2 SERPL-SCNC: 26 MMOL/L (ref 18–29)
CREAT SERPL-MCNC: 1.12 MG/DL (ref 0.57–1)
EOSINOPHIL # BLD AUTO: 0.2 X10E3/UL (ref 0–0.4)
EOSINOPHIL NFR BLD AUTO: 4 %
ERYTHROCYTE [DISTWIDTH] IN BLOOD BY AUTOMATED COUNT: 15 % (ref 12.3–15.4)
EST. AVERAGE GLUCOSE BLD GHB EST-MCNC: 120 MG/DL
FT4I SERPL CALC-MCNC: 1.2 (ref 1.2–4.9)
GFR SERPLBLD CREATININE-BSD FMLA CKD-EPI: 52 ML/MIN/1.73
GFR SERPLBLD CREATININE-BSD FMLA CKD-EPI: 60 ML/MIN/1.73
GLOBULIN SER CALC-MCNC: 2.2 G/DL (ref 1.5–4.5)
GLUCOSE SERPL-MCNC: 100 MG/DL (ref 65–99)
HBA1C MFR BLD: 5.8 % (ref 4.8–5.6)
HCT VFR BLD AUTO: 39.7 % (ref 34–46.6)
HDLC SERPL-MCNC: 52 MG/DL
HGB BLD-MCNC: 12.9 G/DL (ref 11.1–15.9)
IMM GRANULOCYTES # BLD: 0 X10E3/UL (ref 0–0.1)
IMM GRANULOCYTES NFR BLD: 0 %
LDLC SERPL CALC-MCNC: 146 MG/DL (ref 0–99)
LYMPHOCYTES # BLD AUTO: 0.8 X10E3/UL (ref 0.7–3.1)
LYMPHOCYTES NFR BLD AUTO: 16 %
MCH RBC QN AUTO: 28.1 PG (ref 26.6–33)
MCHC RBC AUTO-ENTMCNC: 32.5 G/DL (ref 31.5–35.7)
MCV RBC AUTO: 87 FL (ref 79–97)
MONOCYTES # BLD AUTO: 0.3 X10E3/UL (ref 0.1–0.9)
MONOCYTES NFR BLD AUTO: 6 %
NEUTROPHILS # BLD AUTO: 3.7 X10E3/UL (ref 1.4–7)
NEUTROPHILS NFR BLD AUTO: 74 %
PLATELET # BLD AUTO: 178 X10E3/UL (ref 150–379)
POTASSIUM SERPL-SCNC: 4.4 MMOL/L (ref 3.5–5.2)
PROT SERPL-MCNC: 6.3 G/DL (ref 6–8.5)
RBC # BLD AUTO: 4.59 X10E6/UL (ref 3.77–5.28)
SODIUM SERPL-SCNC: 142 MMOL/L (ref 134–144)
T3RU NFR SERPL: 24 % (ref 24–39)
T4 SERPL-MCNC: 5.2 UG/DL (ref 4.5–12)
TRIGL SERPL-MCNC: 119 MG/DL (ref 0–149)
TSH SERPL DL<=0.005 MIU/L-ACNC: 4.43 UIU/ML (ref 0.45–4.5)
VLDLC SERPL CALC-MCNC: 24 MG/DL (ref 5–40)
WBC # BLD AUTO: 5 X10E3/UL (ref 3.4–10.8)

## 2018-03-02 ENCOUNTER — OFFICE VISIT (OUTPATIENT)
Dept: INTERNAL MEDICINE CLINIC | Age: 63
End: 2018-03-02

## 2018-03-02 VITALS
TEMPERATURE: 98.2 F | HEART RATE: 79 BPM | DIASTOLIC BLOOD PRESSURE: 67 MMHG | RESPIRATION RATE: 14 BRPM | OXYGEN SATURATION: 97 % | WEIGHT: 181.8 LBS | SYSTOLIC BLOOD PRESSURE: 122 MMHG | BODY MASS INDEX: 29.22 KG/M2 | HEIGHT: 66 IN

## 2018-03-02 DIAGNOSIS — E55.9 HYPOVITAMINOSIS D: ICD-10-CM

## 2018-03-02 DIAGNOSIS — R00.2 PALPITATION: Primary | ICD-10-CM

## 2018-03-02 DIAGNOSIS — R41.3 MEMORY CHANGES: ICD-10-CM

## 2018-03-02 DIAGNOSIS — E78.00 HYPERCHOLESTEREMIA: ICD-10-CM

## 2018-03-02 DIAGNOSIS — R94.4 DECREASED GFR: ICD-10-CM

## 2018-03-02 DIAGNOSIS — F41.1 GAD (GENERALIZED ANXIETY DISORDER): ICD-10-CM

## 2018-03-02 RX ORDER — MULTIVITAMIN
2 TABLET ORAL
COMMUNITY
End: 2021-11-22

## 2018-03-02 NOTE — MR AVS SNAPSHOT
[address] [facility] [address] [facility] 57 
[phone] Patient: Linda Pinon MRN: [id number] :1955 Visit Information Date & Time Provider Department Dept. Phone Encounter #  
 3/2/2018  1:00 PM Fatmata Alves MD [facility] Internal Medicine [phone] [id number] Follow-up Instructions Return in about 4 months (around 2018) for Physical - 30 minute appointment. Your Appointments 3/16/2018  9:00 AM  
New Patient with Noelle Severs, MD  
[facility] ([address]) Appt Note: np ref by Dr. snell heart Maria Eugenia Lety [address] [facility]  
[facility] [address] [facility] [address] Upcoming Health Maintenance Date Due  
 PAP AKA CERVICAL CYTOLOGY 10/15/2018 BREAST CANCER SCRN MAMMOGRAM 2019 DTaP/Tdap/Td series (2 - Td) 2025 COLONOSCOPY 2025 Allergies as of 3/2/2018  Review Complete On: 3/2/2018 By: Fatmata Alves MD  
  
 Severity Noted Reaction Type Reactions Keflex [Cephalexin]  2017    Nausea Only Current Immunizations  Reviewed on 2016 Name Date Influenza Vaccine (Quad) PF 10/15/2015  9:38 AM  
 Influenza Vaccine PF 10/13/2014, 2013 12:31 PM  
 Influenza Vaccine Whole 2009 Pneumococcal Polysaccharide (PPSV-23) 2016 11:20 AM  
 Tdap 2015 Zoster Vaccine, Live 2015 Not reviewed this visit You Were Diagnosed With   
  
 Codes Comments Palpitation    -  Primary ICD-10-CM: R00.2 ICD-9-CM: 785.1 ENIO (generalized anxiety disorder)     ICD-10-CM: F41.1 ICD-9-CM: 300.02 Hypercholesteremia     ICD-10-CM: E78.00 ICD-9-CM: 272.0 Hypovitaminosis D     ICD-10-CM: E55.9 ICD-9-CM: 268.9 Memory changes     ICD-10-CM: R41.3 ICD-9-CM: 780.93 Vitals BP Pulse Temp Resp Height(growth percentile) Weight(growth percentile) 122/67 (BP 1 Location: Right arm, BP Patient Position: Sitting) 79 98.2 °F (36.8 °C) (Oral) 14 5' 5.91\" (1.674 m) 181 lb 12.8 oz (82.5 kg) SpO2 BMI OB Status Smoking Status 97% 29.42 kg/m2 Hysterectomy Current Every Day Smoker Vitals History BMI and BSA Data Body Mass Index Body Surface Area  
 29.42 kg/m 2 1.96 m 2 Preferred Pharmacy Pharmacy Name Phone [facility], [address] [facility] [phone] Your Updated Medication List  
  
   
This list is accurate as of 3/2/18  1:55 PM.  Always use your most recent med list.  
  
  
  
  
 buPROPion  mg XL tablet Commonly known as:  Gonzales Backers Take 1 Tab by mouth every morning. CINNAMON 500 mg Cap Generic drug:  cinnamon bark Take 2 Tabs by mouth daily. OMEGA 3 PO Take 1 Tab by mouth daily. sertraline 100 mg tablet Commonly known as:  ZOLOFT Take 1.5 Tabs by mouth daily. VITAMIN D3 1,000 unit tablet Generic drug:  cholecalciferol Take 4,000 Units by mouth daily. We Performed the Following REFERRAL TO CARDIOLOGY [LVJ95 Custom] REFERRAL TO PSYCHIATRY [REF91 Custom] Follow-up Instructions Return in about 4 months (around 7/2/2018) for Physical - 30 minute appointment. Referral Information Referral ID Referred By Referred To  
  
 [id number] Mariya Mario 36., MD [place]  [address] Phone: [phone] Fax: [phone] Visits Status Start Date End Date 1 New Request 3/2/18 3/2/19 If your referral has a status of pending review or denied, additional information will be sent to support the outcome of this decision. Referral ID Referred By Referred To  
 [id number] Stacy STRICKLAND [facility] , PHD  
   [address] [place] [address] [address] Phone: [phone] Fax: [phone] Visits Status Start Date End Date 1 New Request 3/2/18 3/2/19 If your referral has a status of pending review or denied, additional information will be sent to support the outcome of this decision. Patient Instructions It was a pleasure to see you! As discussed: 
 
I have referred you to the Psychiatrist for memory evaluation For your palpitations we have set you up with cardiology for evaluation. Lab review  
-Your cholesterol is elevated, if you completely quit smoking you will likely not need a statin (heart disease risk is 4.8% without smoking, 9.3% with smoking). Continue to work on optimizing your weight (goal lose at least 5% body weight), healthy eating and cardiovascular disease (Recommendation: reduce carbs to 150-200g/ day and the Mediterranean Diet). -Your prediabetes as improved. Some labs that may have been tested and their explanation are: 
Your electrolytes, kidney & liver function (Metabolic Panel) Anemia, blood cells (CBC) Thyroid (TSH + T4, T3) Hormones (prolactin, vitamin D ) Pregnancy (Beta HCG) Diabetes (Hemoglobin A1c) Lipid Panel (Cholesterol, HDL \"good\", LDL \"bad\") Palpitations: Care Instructions Your Care Instructions Heart palpitations are the uncomfortable sensation that your heart is beating fast or irregularly. You might feel pounding or fluttering in your chest. It might feel like your heart is skipping a beat. Although palpitations may be caused by a heart problem, they also occur because of stress, fatigue, or use of alcohol, caffeine, or nicotine. Many medicines, including diet pills, antihistamines, decongestants, and some herbal products, can cause heart palpitations. Nearly everyone has palpitations from time to time. Depending on your symptoms, your doctor may need to do more tests to try to find the cause of your palpitations. Follow-up care is a key part of your treatment and safety. Be sure to make and go to all appointments, and call your doctor if you are having problems. It's also a good idea to know your test results and keep a list of the medicines you take. How can you care for yourself at home? · Avoid caffeine, nicotine, and excess alcohol. · Do not take illegal drugs, such as methamphetamines and cocaine. · Do not take weight loss or diet medicines unless you talk with your doctor first. 
· Get plenty of sleep. · Do not overeat. · If you have palpitations again, take deep breaths and try to relax. This may slow a racing heart. · If you start to feel lightheaded, lie down to avoid injuries that might result if you pass out and fall down. · Keep a record of your palpitations and bring it to your next doctor's appointment. Write down: ¨ The date and time. ¨ Your pulse. (If your heart is beating fast, it may be hard to count your pulse.) ¨ What you were doing when the palpitations started. ¨ How long the palpitations lasted. ¨ Any other symptoms. · If an activity causes palpitations, slow down or stop. Talk to your doctor before you do that activity again. · Take your medicines exactly as prescribed. Call your doctor if you think you are having a problem with your medicine. When should you call for help? Call 911 anytime you think you may need emergency care. For example, call if: 
? · You passed out (lost consciousness). ? · You have symptoms of a heart attack. These may include: ¨ Chest pain or pressure, or a strange feeling in the chest. 
¨ Sweating. ¨ Shortness of breath. ¨ Pain, pressure, or a strange feeling in the back, neck, jaw, or upper belly or in one or both shoulders or arms. ¨ Lightheadedness or sudden weakness. ¨ A fast or irregular heartbeat. After you call 911, the  may tell you to chew 1 adult-strength or 2 to 4 low-dose aspirin. Wait for an ambulance.  Do not try to drive yourself. ? · You have symptoms of a stroke. These may include: 
¨ Sudden numbness, tingling, weakness, or loss of movement in your face, arm, or leg, especially on only one side of your body. ¨ Sudden vision changes. ¨ Sudden trouble speaking. ¨ Sudden confusion or trouble understanding simple statements. ¨ Sudden problems with walking or balance. ¨ A sudden, severe headache that is different from past headaches. ?Call your doctor now or seek immediate medical care if: 
? · You have heart palpitations and: ¨ Are dizzy or lightheaded, or you feel like you may faint. ¨ Have new or increased shortness of breath. ? Watch closely for changes in your health, and be sure to contact your doctor if: 
? · You continue to have heart palpitations. Where can you learn more? Go to http://zaidCloudAptituderuddy.info/. Enter R508 in the search box to learn more about \"Palpitations: Care Instructions. \" Current as of: September 21, 2016 Content Version: 11.4 © 3046-6612 Tangled. Care instructions adapted under license by [employer] (which disclaims liability or warranty for this information). If you have questions about a medical condition or this instruction, always ask your healthcare professional. [place] [address] any warranty or liability for your use of this information. Leg and Ankle Edema: Care Instructions Your Care Instructions Swelling in the legs, ankles, and feet is called edema. It is common after you sit or stand for a while. Long plane flights or car rides often cause swelling in the legs and feet. You may also have swelling if you have to stand for long periods of time at your job. Problems with the veins in the legs (varicose veins) and changes in hormones can also cause swelling.  Sometimes the swelling in the ankles and feet is caused by a more serious problem, such as heart failure, infection, blood clots, or liver or kidney disease. Follow-up care is a key part of your treatment and safety. Be sure to make and go to all appointments, and call your doctor if you are having problems. It's also a good idea to know your test results and keep a list of the medicines you take. How can you care for yourself at home? · If your doctor gave you medicine, take it as prescribed. Call your doctor if you think you are having a problem with your medicine. · Whenever you are resting, raise your legs up. Try to keep the swollen area higher than the level of your heart. · Take breaks from standing or sitting in one position. ¨ Walk around to increase the blood flow in your lower legs. ¨ Move your feet and ankles often while you stand, or tighten and relax your leg muscles. · Wear support stockings. Put them on in the morning, before swelling gets worse. · Eat a balanced diet. Lose weight if you need to. · Limit the amount of salt (sodium) in your diet. Salt holds fluid in the body and may increase swelling. When should you call for help? Call 911 anytime you think you may need emergency care. For example, call if: 
? · You have symptoms of a blood clot in your lung (called a pulmonary embolism). These may include: 
¨ Sudden chest pain. ¨ Trouble breathing. ¨ Coughing up blood. ?Call your doctor now or seek immediate medical care if: 
? · You have signs of a blood clot, such as: 
¨ Pain in your calf, back of the knee, thigh, or groin. ¨ Redness and swelling in your leg or groin. ? · You have symptoms of infection, such as: 
¨ Increased pain, swelling, warmth, or redness. ¨ Red streaks or pus. ¨ A fever. ? Watch closely for changes in your health, and be sure to contact your doctor if: 
? · Your swelling is getting worse. ? · You have new or worsening pain in your legs. ? · You do not get better as expected. Where can you learn more? Go to http://zaid-ruddy.info/. Enter H316 in the search box to learn more about \"Leg and Ankle Edema: Care Instructions. \" Current as of: March 20, 2017 Content Version: 11.4 © 8104-3978 Coup[employer] Near Me. Care instructions adapted under license by [employer] (which disclaims liability or warranty for this information). If you have questions about a medical condition or this instruction, always ask your healthcare professional. [address] any warranty or liability for your use of this information. Introducing [facility] SERVICES! Dear Olga Quintero: Thank you for requesting a Anctu account. Our records indicate that you already have an active Anctu account. You can access your account anytime at [email or web address] Did you know that you can access your hospital and ER discharge instructions at any time in Anctu? You can also review all of your test results from your hospital stay or ER visit. Additional Information If you have questions, please visit the Frequently Asked Questions section of the Anctu website at [email or web address] Remember, Anctu is NOT to be used for urgent needs. For medical emergencies, dial 911. Now available from your iPhone and Android! Please provide this summary of care documentation to your next provider. Your primary care clinician is listed as Ulysses Ace. If you have any questions after today's visit, please call [phone].

## 2018-03-02 NOTE — PROGRESS NOTES
HISTORY OF PRESENT ILLNESS  Namrata Stockton is a 60 y.o. female. HPI  Edema  Patient complains of edema. The location of the edema is lower leg(s) bilateral.  The edema has been mild. Onset of symptoms was a few weeks ago, stable since that time. The edema is present all day. The patient states never. The swelling has been aggravated by nothing, relieved by nothing, and been associated with nothing. Cardiac risk factors include smoking/ tobacco exposure, family history, dyslipidemia, post-menopausal. No h/o DVT. Cardiovascular Review:  The patient has prediabetes and hyperlipidemia. Body mass index is 29.42 kg/(m^2). smoker  +Palpitations mostly at night when lying down. +Father had heart diease- two angioplasty; Mother (80) has mitral valve. She has been taking cinnamon. Diet and Lifestyle: does not rigorously follow a diabetic diet, smoker contemptive, she has cut down- only smokes socially , nonsmoker  Home BP Monitoring: is not measured at home. Pertinent ROS: taking medications as instructed, no medication side effects noted, no TIA's, no chest pain on exertion, no dyspnea on exertion, + swelling of ankles. Memory Changes   She has noticed changes in hershort term memory and placement of items. Sleep varies 7 hrs, +Snoring. No apneic episodes   Not taking Ativan since our discussion on the side effects. +H/o depression. Taking Zoloft 175mg. Wellbutrin 300mg   PHQ over the last two weeks 3/2/2018   Little interest or pleasure in doing things Not at all   Feeling down, depressed or hopeless Not at all   Total Score PHQ 2 0          Review of Systems   Constitutional: Negative for diaphoresis, fever and weight loss. Eyes: Negative for blurred vision and pain. Respiratory: Negative for shortness of breath. Cardiovascular: Positive for palpitations and leg swelling. Negative for chest pain and orthopnea. Neurological: Negative for focal weakness and headaches.    Psychiatric/Behavioral: Positive for memory loss. Negative for depression. The patient is not nervous/anxious. 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  FLAXSEED OIL (OMEGA 3 PO) Take 1 Tab by mouth daily.  sertraline (ZOLOFT) 100 mg tablet Take 1.5 Tabs by mouth daily. 135 Tab 0    buPROPion XL (WELLBUTRIN XL) 300 mg XL tablet Take 1 Tab by mouth every morning. 30 Tab 4    cholecalciferol (VITAMIN D3) 1,000 unit tablet Take 4,000 Units by mouth daily.  esomeprazole (NEXIUM) 20 mg capsule Take  by mouth daily.  KRILL OIL PO Take  by mouth.       LORazepam (ATIVAN) 0.5 mg tablet Take 1/2 tablet nightly as needed for sleep 30 Tab 0       Allergies   Allergen Reactions    Keflex [Cephalexin] Nausea Only      Visit Vitals    /67 (BP 1 Location: Right arm, BP Patient Position: Sitting)    Pulse 79    Temp 98.2 °F (36.8 °C) (Oral)    Resp 14    Ht 5' 5.91\" (1.674 m)    Wt 181 lb 12.8 oz (82.5 kg)    SpO2 97%    BMI 29.42 kg/m2       Physical Exam  Lab Results  Component Value Date/Time   WBC 5.0 02/26/2018 08:30 AM   HGB 12.9 02/26/2018 08:30 AM   HCT 39.7 02/26/2018 08:30 AM   PLATELET 570 70/03/9267 08:30 AM   MCV 87 02/26/2018 08:30 AM     Lab Results  Component Value Date/Time   Hemoglobin A1c 5.8 (H) 02/26/2018 08:30 AM   Hemoglobin A1c 6.3 (H) 04/20/2017 09:15 AM   Glucose 100 (H) 02/26/2018 08:30 AM LDL, calculated 146 (H) 02/26/2018 08:30 AM   Creatinine 1.12 (H) 02/26/2018 08:30 AM      Lab Results  Component Value Date/Time   Cholesterol, total 222 (H) 02/26/2018 08:30 AM   HDL Cholesterol 52 02/26/2018 08:30 AM   LDL, calculated 146 (H) 02/26/2018 08:30 AM   Triglyceride 119 02/26/2018 08:30 AM     Lab Results  Component Value Date/Time   ALT (SGPT) 15 02/26/2018 08:30 AM   AST (SGOT) 11 02/26/2018 08:30 AM   Alk. phosphatase 83 02/26/2018 08:30 AM   Bilirubin, total 0.3 02/26/2018 08:30 AM   Albumin 4.1 02/26/2018 08:30 AM   Protein, total 6.3 02/26/2018 08:30 AM   PLATELET 010 21/05/8729 08:30 AM       Lab Results  Component Value Date/Time   TSH 4.430 02/26/2018 08:30 AM   T3 Uptake 24 02/26/2018 08:30 AM   T4, Total 5.2 02/26/2018 08:30 AM      Lab Results   Component Value Date/Time    Glucose 100 (H) 02/26/2018 08:30 AM         ASSESSMENT and PLAN  I have referred you to the Psychiatrist for memory evaluation     For your palpitations we have set you up with cardiology for evaluation. Lab review   -Your cholesterol is elevated, if you completely quit smoking you will likely not need a statin (heart disease risk is 4.8% without smoking, 9.3% with smoking). Continue to work on optimizing your weight (goal lose at least 5% body weight), healthy eating and cardiovascular disease (Recommendation: reduce carbs to 150-200g/ day and the Mediterranean Diet). -Your prediabetes as improved. Some labs that may have been tested and their explanation are:  Your electrolytes, kidney & liver function (Metabolic Panel)   Anemia, blood cells (CBC)  Thyroid (TSH + T4, T3)  Hormones (prolactin, vitamin D )   Pregnancy (Beta HCG)    Diabetes (Hemoglobin A1c)   Lipid Panel (Cholesterol, HDL \"good\", LDL \"bad\")      Diagnoses and all orders for this visit:    1.  Palpitation- NSR on exam. Given risk factors would benefit from seeing cardiology appt made by our office.   -     REFERRAL TO CARDIOLOGY    2. ENIO (generalized anxiety disorder)- stable Continue Zoloft 175mg. Patient Education:  Reviewed concept of anxiety as biochemical imbalance of neurotransmitters and rationale for treatment. Instructed patient to contact office or 911 promptly should condition worsen or any new symptoms appear and provided on-call telephone numbers. IF THE PATIENT HAS ANY SUICIDAL OR HOMICIDAL IDEATION, CALL THE OFFICE, DISCUSS WITH A SUPPORT MEMBER OR GO TO THE ER IMMEDIATELY. Patient was agreeable with this      3. Hypercholesteremia- LDL elevated. ASCVD 4.8% without smoking. Trial of smoking cessation as she is almost completely tobacco free. Will start statin if LDL still elevated at next appt  -Your cholesterol is elevated, if you completely quit smoking you will likely not need a statin (heart disease risk is 4.8% without smoking, 9.3% with smoking). Continue to work on optimizing your weight (goal lose at least 5% body weight), healthy eating and cardiovascular disease (Recommendation: reduce carbs to 150-200g/ day and the Mediterranean Diet). -     REFERRAL TO CARDIOLOGY    4. Hypovitaminosis D- continue vitamin d dosing. 5. Memory changes- would benefit from Neuropsych testing.   -     REFERRAL TO PSYCHIATRY    6. Decreased GFR- slight decrease. Check without fasting. Precautions reviewed    In the interim, I recommend the following:  -Stay well hydrated--> goal is to have light yellow urine daily which signifies you have drunk enough fluid  -Avoid using medicines that can damage the kidneys. These medicines include nonsteroidal anti-inflammatory drugs, NSAID pain medication. Examples of these are ibuprofen (Advil, Motrin), naproxen (Aleve) and celecoxib (Celebrex). -Limit your  sodium intake to 1500mg/ day   -Continue eating a healthy diet, high in fruits and vegetables.   -Continue exercising regularly- goal is 150 minutes/ week of moderate exercise.       Seek immediate medical attention if:  -You stop urinating or have increased urination (i.e. Waking up multiple time at night)  -Notice your urine is very frothy  -Have swelling in your legs, face or other parts of your body  -Experience shortness of breath, fatigability, chest pain, or shortness of breath with minimal exertion. Follow-up Disposition:  Return in about 4 months (around 7/2/2018) for Physical - 30 minute appointment. Medication risks/benefits/costs/interactions/alternatives discussed with patient. Booker Dumont  was given an after visit summary which includes diagnoses, current medications, & vitals. she expressed understanding with the diagnosis and plan.     40 minutes of 45 minutes of care coordination was spent counseling patient on treatment plan and assessing understanding

## 2018-03-02 NOTE — PROGRESS NOTES
Chief Complaint   Patient presents with    Cholesterol Problem     1. Have you been to the ER, urgent care clinic since your last visit? Hospitalized since your last visit? No    2. Have you seen or consulted any other health care providers outside of the [address] [place] [address] since your last visit? Include any pap smears or colon screening.  No     Heart flutters    Swelling in legs    Hx of alzheimer's

## 2018-03-02 NOTE — PATIENT INSTRUCTIONS
It was a pleasure to see you! As discussed:    I have ordered your age appropriate labs please complete them. You will need to fast 10-12hrs before your lab appointment. Complete labs two weeks before your next appointment. I have referred you to the Psychiatrist for memory evaluation     For your palpitations we have set you up with cardiology for evaluation. Lab review   -Your cholesterol is elevated, if you completely quit smoking you will likely not need a statin (heart disease risk is 4.8% without smoking, 9.3% with smoking). Continue to work on optimizing your weight (goal lose at least 5% body weight), healthy eating and cardiovascular disease (Recommendation: reduce carbs to 150-200g/ day and the Mediterranean Diet). -Your prediabetes as improved. -Your kidney function is slightly decreased. Complete labs in 2 weeks WITHOUT Fasting   Some labs that may have been tested and their explanation are:  Your electrolytes, kidney & liver function (Metabolic Panel)   Anemia, blood cells (CBC)  Thyroid (TSH + T4, T3)  Hormones (prolactin, vitamin D )   Pregnancy (Beta HCG)    Diabetes (Hemoglobin A1c)   Lipid Panel (Cholesterol, HDL \"good\", LDL \"bad\")      In the interim, I recommend the following:  -Stay well hydrated--> goal is to have light yellow urine daily which signifies you have drunk enough fluid  -Avoid using medicines that can damage the kidneys. These medicines include nonsteroidal anti-inflammatory drugs, NSAID pain medication. Examples of these are ibuprofen (Advil, Motrin), naproxen (Aleve) and celecoxib (Celebrex). -Limit your  sodium intake to 1500mg/ day   -Continue eating a healthy diet, high in fruits and vegetables.   -Continue exercising regularly- goal is 150 minutes/ week of moderate exercise.       Seek immediate medical attention if:  -You stop urinating or have increased urination (i.e. Waking up multiple time at night)  -Notice your urine is very frothy  -Have swelling in your legs, face or other parts of your body  -Experience shortness of breath, fatigability, chest pain, or shortness of breath with minimal exertion. Palpitations: Care Instructions  Your Care Instructions    Heart palpitations are the uncomfortable sensation that your heart is beating fast or irregularly. You might feel pounding or fluttering in your chest. It might feel like your heart is skipping a beat. Although palpitations may be caused by a heart problem, they also occur because of stress, fatigue, or use of alcohol, caffeine, or nicotine. Many medicines, including diet pills, antihistamines, decongestants, and some herbal products, can cause heart palpitations. Nearly everyone has palpitations from time to time. Depending on your symptoms, your doctor may need to do more tests to try to find the cause of your palpitations. Follow-up care is a key part of your treatment and safety. Be sure to make and go to all appointments, and call your doctor if you are having problems. It's also a good idea to know your test results and keep a list of the medicines you take. How can you care for yourself at home? · Avoid caffeine, nicotine, and excess alcohol. · Do not take illegal drugs, such as methamphetamines and cocaine. · Do not take weight loss or diet medicines unless you talk with your doctor first.  · Get plenty of sleep. · Do not overeat. · If you have palpitations again, take deep breaths and try to relax. This may slow a racing heart. · If you start to feel lightheaded, lie down to avoid injuries that might result if you pass out and fall down. · Keep a record of your palpitations and bring it to your next doctor's appointment. Write down:  ¨ The date and time. ¨ Your pulse. (If your heart is beating fast, it may be hard to count your pulse.)  ¨ What you were doing when the palpitations started. ¨ How long the palpitations lasted. ¨ Any other symptoms.   · If an activity causes palpitations, slow down or stop. Talk to your doctor before you do that activity again. · Take your medicines exactly as prescribed. Call your doctor if you think you are having a problem with your medicine. When should you call for help? Call 911 anytime you think you may need emergency care. For example, call if:  ? · You passed out (lost consciousness). ? · You have symptoms of a heart attack. These may include:  ¨ Chest pain or pressure, or a strange feeling in the chest.  ¨ Sweating. ¨ Shortness of breath. ¨ Pain, pressure, or a strange feeling in the back, neck, jaw, or upper belly or in one or both shoulders or arms. ¨ Lightheadedness or sudden weakness. ¨ A fast or irregular heartbeat. After you call 911, the  may tell you to chew 1 adult-strength or 2 to 4 low-dose aspirin. Wait for an ambulance. Do not try to drive yourself. ? · You have symptoms of a stroke. These may include:  ¨ Sudden numbness, tingling, weakness, or loss of movement in your face, arm, or leg, especially on only one side of your body. ¨ Sudden vision changes. ¨ Sudden trouble speaking. ¨ Sudden confusion or trouble understanding simple statements. ¨ Sudden problems with walking or balance. ¨ A sudden, severe headache that is different from past headaches. ?Call your doctor now or seek immediate medical care if:  ? · You have heart palpitations and:  ¨ Are dizzy or lightheaded, or you feel like you may faint. ¨ Have new or increased shortness of breath. ? Watch closely for changes in your health, and be sure to contact your doctor if:  ? · You continue to have heart palpitations. Where can you learn more? Go to http://zaid-ruddy.info/. Enter R508 in the search box to learn more about \"Palpitations: Care Instructions. \"  Current as of: September 21, 2016  Content Version: 11.4  © 4923-9576 [employer].  Care instructions adapted under license by [employer] (which disclaims liability or warranty for this information). If you have questions about a medical condition or this instruction, always ask your healthcare professional. [address] any warranty or liability for your use of this information. Leg and Ankle Edema: Care Instructions  Your Care Instructions  Swelling in the legs, ankles, and feet is called edema. It is common after you sit or stand for a while. Long plane flights or car rides often cause swelling in the legs and feet. You may also have swelling if you have to stand for long periods of time at your job. Problems with the veins in the legs (varicose veins) and changes in hormones can also cause swelling. Sometimes the swelling in the ankles and feet is caused by a more serious problem, such as heart failure, infection, blood clots, or liver or kidney disease. Follow-up care is a key part of your treatment and safety. Be sure to make and go to all appointments, and call your doctor if you are having problems. It's also a good idea to know your test results and keep a list of the medicines you take. How can you care for yourself at home? · If your doctor gave you medicine, take it as prescribed. Call your doctor if you think you are having a problem with your medicine. · Whenever you are resting, raise your legs up. Try to keep the swollen area higher than the level of your heart. · Take breaks from standing or sitting in one position. ¨ Walk around to increase the blood flow in your lower legs. ¨ Move your feet and ankles often while you stand, or tighten and relax your leg muscles. · Wear support stockings. Put them on in the morning, before swelling gets worse. · Eat a balanced diet. Lose weight if you need to. · Limit the amount of salt (sodium) in your diet. Salt holds fluid in the body and may increase swelling. When should you call for help? Call 911 anytime you think you may need emergency care.  For example, call if:  ? · You have symptoms of a blood clot in your lung (called a pulmonary embolism). These may include:  ¨ Sudden chest pain. ¨ Trouble breathing. ¨ Coughing up blood. ?Call your doctor now or seek immediate medical care if:  ? · You have signs of a blood clot, such as:  ¨ Pain in your calf, back of the knee, thigh, or groin. ¨ Redness and swelling in your leg or groin. ? · You have symptoms of infection, such as:  ¨ Increased pain, swelling, warmth, or redness. ¨ Red streaks or pus. ¨ A fever. ? Watch closely for changes in your health, and be sure to contact your doctor if:  ? · Your swelling is getting worse. ? · You have new or worsening pain in your legs. ? · You do not get better as expected. Where can you learn more? Go to http://zaid-ruddy.info/. Enter U300 in the search box to learn more about \"Leg and Ankle Edema: Care Instructions. \"  Current as of: March 20, 2017  Content Version: 11.4  © 6524-4371 [employer]. Care instructions adapted under license by [employer] (which disclaims liability or warranty for this information). If you have questions about a medical condition or this instruction, always ask your healthcare professional. [address] any warranty or liability for your use of this information.

## 2018-03-15 PROBLEM — R60.0 LEG EDEMA: Status: ACTIVE | Noted: 2018-03-15

## 2018-03-15 PROBLEM — R00.2 PALPITATIONS: Status: ACTIVE | Noted: 2018-03-15

## 2018-03-16 ENCOUNTER — OFFICE VISIT (OUTPATIENT)
Dept: CARDIOLOGY CLINIC | Age: 63
End: 2018-03-16

## 2018-03-16 VITALS
RESPIRATION RATE: 16 BRPM | WEIGHT: 181.8 LBS | HEART RATE: 75 BPM | SYSTOLIC BLOOD PRESSURE: 120 MMHG | HEIGHT: 66 IN | OXYGEN SATURATION: 98 % | DIASTOLIC BLOOD PRESSURE: 88 MMHG | BODY MASS INDEX: 29.22 KG/M2

## 2018-03-16 DIAGNOSIS — E78.00 HYPERCHOLESTEREMIA: ICD-10-CM

## 2018-03-16 DIAGNOSIS — R00.2 PALPITATIONS: Primary | ICD-10-CM

## 2018-03-16 DIAGNOSIS — R60.0 LEG EDEMA: ICD-10-CM

## 2018-03-16 DIAGNOSIS — R29.818 SUSPECTED SLEEP APNEA: ICD-10-CM

## 2018-03-16 RX ORDER — IBUPROFEN 200 MG
400 TABLET ORAL
COMMUNITY
End: 2021-10-15

## 2018-03-16 RX ORDER — ASPIRIN 81 MG/1
TABLET ORAL DAILY
COMMUNITY
End: 2021-06-25 | Stop reason: ALTCHOICE

## 2018-03-16 NOTE — PROGRESS NOTES
HISTORY OF PRESENT ILLNESS  Aleksandra Anthony is a 60 y.o. female     SUMMARY:   Problem List  Date Reviewed: 3/16/2018          Codes Class Noted    Palpitations ICD-10-CM: R00.2  ICD-9-CM: 785.1  3/15/2018        Leg edema ICD-10-CM: R60.0  ICD-9-CM: 782.3  3/15/2018        Incidental lung nodule ICD-10-CM: R91.1  ICD-9-CM: 793.11  9/26/2016    Overview Signed 9/26/2016  4:35 PM by Nyla Petersen     9/26/2016 L apex             [facility] ICD-10-CM: Q85.00  ICD-9-CM: 237.70  6/14/2016    Overview Signed 6/14/2016 12:23 PM by Ariel Cruz - 11/2015              Prediabetes ICD-10-CM: R73.03  ICD-9-CM: 790.29  6/1/2016    Overview Signed 8/22/2016 11:18 AM by Nyla Petersen     A1C 6.1             Elevated LDL cholesterol level ICD-10-CM: E78.00  ICD-9-CM: 272.0  7/28/2014        Hypercholesteremia ICD-10-CM: E78.00  ICD-9-CM: 272.0  6/19/2014        ENIO (generalized anxiety disorder) ICD-10-CM: F41.1  ICD-9-CM: 300.02  4/14/2014    Overview Signed 1/26/2015  9:31 AM by Ariel Bingham     Prior therapy:  Effexor. Lexapro.                Hypovitaminosis D ICD-10-CM: E55.9  ICD-9-CM: 268.9  4/14/2014        Tobacco dependence ICD-10-CM: F17.200  ICD-9-CM: 305.1  12/2/2013    Overview Signed 10/22/2015  9:04 AM by Ariel Santos Oct 2015             Depression with anxiety ICD-10-CM: F41.8  ICD-9-CM: 300.4  12/2/2013        H/O colonoscopy ICD-10-CM: Z98.890  ICD-9-CM: V45.89  12/2/2013    Overview Addendum 10/15/2015  9:22 AM by Ariel Bingham     June 5 2015 - Dr. Kinjal Villalobos - normal - f/u 10 yrs             Osteopenia ICD-10-CM: M85.80  ICD-9-CM: 733.90  12/2/2013    Overview Signed 12/2/2013 11:59 AM by Maria Luisa Mejia [address] [place] [address]     12/2012 - BMD             Post-nasal drip ICD-10-CM: R09.82  ICD-9-CM: 784.91  12/2/2013              Current Outpatient Prescriptions on File Prior to Visit   Medication Sig    cinnamon bark (CINNAMON) 500 mg cap Take 2 Tabs by mouth daily.  FLAXSEED OIL (OMEGA 3 PO) Take 1 Tab by mouth daily.  sertraline (ZOLOFT) 100 mg tablet Take 1.5 Tabs by mouth daily. (Patient taking differently: Take 175 mg by mouth daily.)    buPROPion XL (WELLBUTRIN XL) 300 mg XL tablet Take 1 Tab by mouth every morning. (Patient taking differently: Take 300 mg by mouth every evening.)    cholecalciferol (VITAMIN D3) 1,000 unit tablet Take 2,000 Units by mouth two (2) times a day. No current facility-administered medications on file prior to visit. CARDIOLOGY STUDIES TO DATE:  None      Chief Complaint   Patient presents with    Irregular Heart Beat     referred by PCP. Denies chest pain/shortness of breath/dizziness. Occasional swelling. HPI :  Ms. Noble Ferreira is a 60year-old referred by Dr. Lenard Geiger for cardiac evaluation. She is a smoker, has borderline cholesterol, prediabetes, no hypertension and no family history of premature coronary disease. She walks or swims for 30-60 minutes three times a week with no worrisome symptoms. About a month ago, she began to try some over-the-counter cholesterol medication and she noticed around that time that she was having frequent palpitations, maybe once a day for five to ten minutes at a time and when this would happen she would feel a little lightheaded and her breath would be interrupted. She also has recently noticed some dependent lower extremity edema. She stopped the over-the-counter medication after she read about side effects and her palpitations have nearly gone away. Interestingly, she does snore quite heavily and often times feels fatigued during the day, but she has never been evaluated for sleep apnea. She has been very stressed the month or two because her mother was in declining health and  earlier this week at age 80. Her EKG today shows sinus rhythm with right atrial enlargement. Otherwise, unremarkable. No different from her tracing in 2015.   She has occasional episodes of heartburn and indigestion and some mild generalized arthritis. CARDIAC ROS:   negative for chest pain, dyspnea, syncope, orthopnea, paroxysmal nocturnal dyspnea, exertional chest pressure/discomfort, claudication    Family History   Problem Relation Age of Onset    Coronary Artery Disease Father      76    Dementia Father      Alzheimer's    Alzheimer Father     Other Brother      Porphyria    Cancer Mother      BREAST    Arrhythmia Mother      Afib     Breast Cancer Mother     Stroke Sister      ? etiology    Anesth Problems Neg Hx        Past Medical History:   Diagnosis Date    Anxiety disorder     Carpal tunnel syndrome of left wrist     DDD (degenerative disc disease), thoracic 10/13/14    Depression     Hypercholesterolemia     Neurofibroma determined by biopsy of tongue ([facility]     Dr. Kavya Jackson    Prediabetes 06/2016    A1C 6.1    Scoliosis 10/13/14    thoracic spine, mild       GENERAL ROS:  A comprehensive review of systems was negative except for that written in the HPI. Visit Vitals    /88 (BP 1 Location: Left arm, BP Patient Position: Sitting)    Pulse 75    Resp 16    Ht 5' 5.91\" (1.674 m)    Wt 181 lb 12.8 oz (82.5 kg)    SpO2 98%    BMI 29.42 kg/m2       Wt Readings from Last 3 Encounters:   03/16/18 181 lb 12.8 oz (82.5 kg)   03/02/18 181 lb 12.8 oz (82.5 kg)   08/23/17 173 lb 9.6 oz (78.7 kg)            BP Readings from Last 3 Encounters:   03/16/18 120/88   03/02/18 122/67   08/23/17 112/80       PHYSICAL EXAM  General appearance: alert, cooperative, no distress, appears stated age  Neurologic: Alert and oriented X 3  Neck: supple, symmetrical, trachea midline, no adenopathy, no carotid bruit and no JVD  Lungs: clear to auscultation bilaterally  Heart: regular rate and rhythm, S1, S2 normal, no murmur, click, rub or gallop  Abdomen: soft, non-tender.  Bowel sounds normal. No masses,  no organomegaly  Extremities: varicose veins noted, edema tr  Pulses: 2+ and symmetric    Lab Results   Component Value Date/Time    Cholesterol, total 222 (H) 02/26/2018 08:30 AM    Cholesterol, total 149 04/20/2017 09:15 AM    Cholesterol, total 216 (H) 06/07/2016 07:39 AM    Cholesterol, total 210 (H) 07/25/2014 09:24 AM    Cholesterol, total 230 (H) 01/13/2014 08:54 AM    HDL Cholesterol 52 02/26/2018 08:30 AM    HDL Cholesterol 56 04/20/2017 09:15 AM    HDL Cholesterol 53 06/07/2016 07:39 AM    HDL Cholesterol 55 07/25/2014 09:24 AM    HDL Cholesterol 56 01/13/2014 08:54 AM    LDL, calculated 146 (H) 02/26/2018 08:30 AM    LDL, calculated 74 04/20/2017 09:15 AM    LDL, calculated 137 (H) 06/07/2016 07:39 AM    LDL, calculated 127 (H) 07/25/2014 09:24 AM    LDL, calculated 150 (H) 01/13/2014 08:54 AM    Triglyceride 119 02/26/2018 08:30 AM    Triglyceride 93 04/20/2017 09:15 AM    Triglyceride 130 06/07/2016 07:39 AM    Triglyceride 138 07/25/2014 09:24 AM    Triglyceride 122 01/13/2014 08:54 AM     ASSESSMENT  I do not think Ms. Luis's palpitations are of any concern at this point, particularly if we get an echocardiogram and everything looks good there. We talked about the fact that they come and go and should they become more persistent, prevalent, prolonged, we can certainly provide her with an event monitor and further define what this is all about. Her dependent edema is multifactorial and most likely related to venous stasis and we talked about that as well. I asked her to talk to Dr. Marcus Larkin when they next meet about whether or not she would benefit from a sleep apnea screening. current treatment plan is effective, no change in therapy  lab results and schedule of future lab studies reviewed with patient  reviewed diet, exercise and weight control    Encounter Diagnoses   Name Primary?     Palpitations Yes    Hypercholesteremia     Leg edema      Orders Placed This Encounter    AMB POC EKG ROUTINE W/ 12 LEADS, INTER & REP    aspirin delayed-release 81 mg tablet    ibuprofen (MOTRIN) 200 mg tablet    FEXOFENADINE HCL (ALLEGRA PO)    FLUTICASONE PROPIONATE (FLONASE ALLERGY RELIEF NA)       Follow-up Disposition:  Return in about 4 weeks (around 4/13/2018).     Shayy Prater MD  3/16/2018

## 2018-03-16 NOTE — MR AVS SNAPSHOT
[address] [facility] [address] [place] [address] 
[phone] Patient: Dave Colby MRN: [id number] :1955 Visit Information Date & Time Provider Department Dept. Phone Encounter #  
 3/16/2018  9:00 AM Sreekanth Barrett MD [facility] [place] [phone] [id number] Follow-up Instructions Return in about 4 weeks (around 2018). Upcoming Health Maintenance Date Due  
 PAP AKA CERVICAL CYTOLOGY 10/15/2018 BREAST CANCER SCRN MAMMOGRAM 2019 DTaP/Tdap/Td series (2 - Td) 2025 COLONOSCOPY 2025 Allergies as of 3/16/2018  Review Complete On: 3/16/2018 By: Sreekanth Barrett MD  
  
 Severity Noted Reaction Type Reactions Keflex [Cephalexin]  2017    Nausea Only Current Immunizations  Reviewed on 2016 Name Date Influenza Vaccine (Quad) PF 10/15/2015  9:38 AM  
 Influenza Vaccine PF 10/13/2014, 2013 12:31 PM  
 Influenza Vaccine Whole 2009 Pneumococcal Polysaccharide (PPSV-23) 2016 11:20 AM  
 Tdap 2015 Zoster Vaccine, Live 2015 Not reviewed this visit You Were Diagnosed With   
  
 Codes Comments Palpitations    -  Primary ICD-10-CM: R00.2 ICD-9-CM: 785.1 Hypercholesteremia     ICD-10-CM: E78.00 ICD-9-CM: 272.0 Leg edema     ICD-10-CM: R60.0 ICD-9-CM: 105. 3 Vitals BP Pulse Resp Height(growth percentile) Weight(growth percentile) SpO2  
 120/88 (BP 1 Location: Left arm, BP Patient Position: Sitting) 75 16 5' 5.91\" (1.674 m) 181 lb 12.8 oz (82.5 kg) 98% BMI OB Status Smoking Status 29.42 kg/m2 Hysterectomy Current Every Day Smoker Vitals History BMI and BSA Data Body Mass Index Body Surface Area  
 29.42 kg/m 2 1.96 m 2 Preferred Pharmacy Pharmacy Name Phone [facility], [address]  [phone] Your Updated Medication List  
  
 This list is accurate as of 3/16/18  9:31 AM.  Always use your most recent med list. ALLEGRA PO Take 1 Tab by mouth daily as needed. aspirin delayed-release 81 mg tablet Take  by mouth daily. buPROPion  mg XL tablet Commonly known as:  Yasmany Dress Take 1 Tab by mouth every morning. CINNAMON 500 mg Cap Generic drug:  cinnamon bark Take 2 Tabs by mouth daily. FLONASE ALLERGY RELIEF NA  
by Nasal route as needed. ibuprofen 200 mg tablet Commonly known as:  MOTRIN Take 400 mg by mouth every six (6) hours as needed for Pain. OMEGA 3 PO Take 1 Tab by mouth daily. sertraline 100 mg tablet Commonly known as:  ZOLOFT Take 1.5 Tabs by mouth daily. VITAMIN D3 1,000 unit tablet Generic drug:  cholecalciferol Take 2,000 Units by mouth two (2) times a day. We Performed the Following AMB POC EKG ROUTINE W/ 12 LEADS, INTER & REP [92662 CPT(R)] Follow-up Instructions Return in about 4 weeks (around 4/13/2018). Introducing [facility]! Dear Stephanie Franklin: Thank you for requesting a [email or web address] account. Our records indicate that you already have an active [email or web address] account. You can access your account anytime at [email or web address] Did you know that you can access your hospital and ER discharge instructions at any time in [email or web address]? You can also review all of your test results from your hospital stay or ER visit. Additional Information If you have questions, please visit the Frequently Asked Questions section of the [email or web address] website at [email or web address] Remember, [email or web address] is NOT to be used for urgent needs. For medical emergencies, dial 911. Now available from your iPhone and Android! Please provide this summary of care documentation to your next provider. Your primary care clinician is listed as Isaiah Mcclellan.  If you have any questions after today's visit, please call [phone].

## 2018-03-30 ENCOUNTER — TELEPHONE (OUTPATIENT)
Dept: CARDIOLOGY CLINIC | Age: 63
End: 2018-03-30

## 2018-03-30 ENCOUNTER — CLINICAL SUPPORT (OUTPATIENT)
Dept: CARDIOLOGY CLINIC | Age: 63
End: 2018-03-30

## 2018-03-30 DIAGNOSIS — R60.0 LEG EDEMA: ICD-10-CM

## 2018-03-30 DIAGNOSIS — E78.00 HYPERCHOLESTEREMIA: ICD-10-CM

## 2018-03-30 DIAGNOSIS — R00.2 PALPITATIONS: ICD-10-CM

## 2018-03-30 NOTE — TELEPHONE ENCOUNTER
----- Message from Conner Page MD sent at 3/30/2018  5:14 PM EDT -----  Heart muscle is strong and valves all ok.  Looks great

## 2018-05-03 LAB
BUN SERPL-MCNC: 21 MG/DL (ref 8–27)
BUN/CREAT SERPL: 22 (ref 12–28)
CALCIUM SERPL-MCNC: 9.3 MG/DL (ref 8.7–10.3)
CHLORIDE SERPL-SCNC: 102 MMOL/L (ref 96–106)
CO2 SERPL-SCNC: 21 MMOL/L (ref 18–29)
CREAT SERPL-MCNC: 0.97 MG/DL (ref 0.57–1)
GFR SERPLBLD CREATININE-BSD FMLA CKD-EPI: 62 ML/MIN/1.73
GFR SERPLBLD CREATININE-BSD FMLA CKD-EPI: 72 ML/MIN/1.73
GLUCOSE SERPL-MCNC: 96 MG/DL (ref 65–99)
POTASSIUM SERPL-SCNC: 4.3 MMOL/L (ref 3.5–5.2)
SODIUM SERPL-SCNC: 139 MMOL/L (ref 134–144)
VIT B12 SERPL-MCNC: 308 PG/ML (ref 232–1245)

## 2018-05-08 ENCOUNTER — OFFICE VISIT (OUTPATIENT)
Dept: CARDIOLOGY CLINIC | Age: 63
End: 2018-05-08

## 2018-05-08 VITALS
SYSTOLIC BLOOD PRESSURE: 130 MMHG | WEIGHT: 183 LBS | DIASTOLIC BLOOD PRESSURE: 90 MMHG | OXYGEN SATURATION: 98 % | HEIGHT: 65 IN | RESPIRATION RATE: 16 BRPM | BODY MASS INDEX: 30.49 KG/M2 | HEART RATE: 82 BPM

## 2018-05-08 DIAGNOSIS — E78.00 HYPERCHOLESTEREMIA: ICD-10-CM

## 2018-05-08 DIAGNOSIS — R60.0 LEG EDEMA: ICD-10-CM

## 2018-05-08 DIAGNOSIS — R00.2 PALPITATIONS: Primary | ICD-10-CM

## 2018-05-08 DIAGNOSIS — R29.818 SUSPECTED SLEEP APNEA: ICD-10-CM

## 2018-05-08 DIAGNOSIS — F41.1 GAD (GENERALIZED ANXIETY DISORDER): ICD-10-CM

## 2018-05-08 RX ORDER — ROSUVASTATIN CALCIUM 10 MG/1
10 TABLET, COATED ORAL
Qty: 30 TAB | Refills: 6 | Status: SHIPPED | OUTPATIENT
Start: 2018-05-08 | End: 2018-09-26 | Stop reason: SDUPTHER

## 2018-05-08 NOTE — PROGRESS NOTES
HISTORY OF PRESENT ILLNESS  Anuj Ricci is a 60 y.o. female     SUMMARY:   Problem List  Date Reviewed: 5/8/2018          Codes Class Noted    Palpitations ICD-10-CM: R00.2  ICD-9-CM: 785.1  3/15/2018    Overview Signed 4/27/2018  7:38 AM by Armond Mathias MD     3/18 sclerotic, non stenotic av otherwise normal echo             Leg edema ICD-10-CM: R60.0  ICD-9-CM: 782.3  3/15/2018        Incidental lung nodule ICD-10-CM: R91.1  ICD-9-CM: 793.11  9/26/2016    Overview Signed 9/26/2016  4:35 PM by Reza Petersen     9/26/2016 L apex             Neurofibroma determined by biopsy of tongue ([facility] [address] ICD-10-CM: Q85.00  ICD-9-CM: 237.70  6/14/2016    Overview Signed 6/14/2016 12:23 PM by [address] [place] [address]     ENT Dr. Obrien Dates - 11/2015              Prediabetes ICD-10-CM: R73.03  ICD-9-CM: 790.29  6/1/2016    Overview Signed 8/22/2016 11:18 AM by Reza Petersen     A1C 6.1             Elevated LDL cholesterol level ICD-10-CM: E78.00  ICD-9-CM: 272.0  7/28/2014        Hypercholesteremia ICD-10-CM: E78.00  ICD-9-CM: 272.0  6/19/2014        ENIO (generalized anxiety disorder) ICD-10-CM: F41.1  ICD-9-CM: 300.02  4/14/2014    Overview Signed 1/26/2015  9:31 AM by [address] [place] [address]     Prior therapy:  Effexor. Lexapro.                Hypovitaminosis D ICD-10-CM: E55.9  ICD-9-CM: 268.9  4/14/2014        Tobacco dependence ICD-10-CM: F17.200  ICD-9-CM: 305.1  12/2/2013    Overview Signed 10/22/2015  9:04 AM by [address] [place] [address]     Chantix Oct 2015             Depression with anxiety ICD-10-CM: F41.8  ICD-9-CM: 300.4  12/2/2013        H/O colonoscopy ICD-10-CM: Z98.890  ICD-9-CM: V45.89  12/2/2013    Overview Addendum 10/15/2015  9:22 AM by [address] [place] [address]     June 5 2015 - Dr. Shani Gaines - normal - f/u 10 yrs             Osteopenia ICD-10-CM: M85.80  ICD-9-CM: 733.90  12/2/2013    Overview Signed 12/2/2013 11:59 AM by Paige Lanier [facility]     12/2012 - BMD             Post-nasal drip ICD-10-CM: M51.36  ICD-9-CM: 784.91  12/2/2013 Current Outpatient Prescriptions on File Prior to Visit   Medication Sig    aspirin delayed-release 81 mg tablet Take  by mouth daily.  ibuprofen (MOTRIN) 200 mg tablet Take 400 mg by mouth every six (6) hours as needed for Pain.  FEXOFENADINE HCL (ALLEGRA PO) Take 1 Tab by mouth daily as needed.  cinnamon bark (CINNAMON) 500 mg cap Take 2 Tabs by mouth daily.  sertraline (ZOLOFT) 100 mg tablet Take 1.5 Tabs by mouth daily. (Patient taking differently: Take 175 mg by mouth daily.)    buPROPion XL (WELLBUTRIN XL) 300 mg XL tablet Take 1 Tab by mouth every morning. (Patient taking differently: Take 300 mg by mouth every evening.)    cholecalciferol (VITAMIN D3) 1,000 unit tablet Take 2,000 Units by mouth two (2) times a day. No current facility-administered medications on file prior to visit. CARDIOLOGY STUDIES TO DATE:  3/18 sclerotic, non stenotic av otherwise normal echo        Chief Complaint   Patient presents with    Palpitations     HPI :  Ms. Krista Silva is doing well. The palpitations are completely gone since she stopped that over-the-counter medication. She is still exercising regularly with no worrisome symptoms. She does have prediabetes and her last LDL cholesterol was 146. Apparently, she did not tolerate 20 mg of Atorvastatin because of muscle aches.           CARDIAC ROS:   negative for chest pain, dyspnea, syncope, orthopnea, paroxysmal nocturnal dyspnea, exertional chest pressure/discomfort, claudication, lower extremity edema    Family History   Problem Relation Age of Onset    Coronary Artery Disease Father      76, first episode late 63's    Dementia Father      Alzheimer's    Alzheimer Father     Other Brother      Porphyria    Cancer Mother      BREAST    Arrhythmia Mother      Afib     Breast Cancer Mother     Stroke Sister      ? etiology    Anesth Problems Neg Hx        Past Medical History:   Diagnosis Date    Anxiety disorder     Carpal tunnel syndrome of left wrist     DDD (degenerative disc disease), thoracic 10/13/14    Depression     Hypercholesterolemia     [facility]     Dr. Pantera Donaldson    Prediabetes 06/2016    A1C 6.1    Scoliosis 10/13/14    thoracic spine, mild       GENERAL ROS:  A comprehensive review of systems was negative except for that written in the HPI.     Visit Vitals    /90 (BP 1 Location: Left arm, BP Patient Position: Sitting)    Pulse 82    Resp 16    Ht 5' 5\" (1.651 m)    Wt 183 lb (83 kg)    SpO2 98%    BMI 30.45 kg/m2       Wt Readings from Last 3 Encounters:   05/08/18 183 lb (83 kg)   03/16/18 181 lb 12.8 oz (82.5 kg)   03/02/18 181 lb 12.8 oz (82.5 kg)            BP Readings from Last 3 Encounters:   05/08/18 130/90   03/16/18 120/88   03/02/18 122/67       PHYSICAL EXAM  General appearance: alert, cooperative, no distress, appears stated age  Neck: supple, symmetrical, trachea midline, no adenopathy, no carotid bruit and no JVD  Lungs: clear to auscultation bilaterally  Heart: regular rate and rhythm, S1, S2 normal, no murmur, click, rub or gallop  Extremities: extremities normal, atraumatic, no cyanosis or edema    Lab Results   Component Value Date/Time    Cholesterol, total 222 (H) 02/26/2018 08:30 AM    Cholesterol, total 149 04/20/2017 09:15 AM    Cholesterol, total 216 (H) 06/07/2016 07:39 AM    Cholesterol, total 210 (H) 07/25/2014 09:24 AM    Cholesterol, total 230 (H) 01/13/2014 08:54 AM    HDL Cholesterol 52 02/26/2018 08:30 AM    HDL Cholesterol 56 04/20/2017 09:15 AM    HDL Cholesterol 53 06/07/2016 07:39 AM    HDL Cholesterol 55 07/25/2014 09:24 AM    HDL Cholesterol 56 01/13/2014 08:54 AM    LDL, calculated 146 (H) 02/26/2018 08:30 AM    LDL, calculated 74 04/20/2017 09:15 AM    LDL, calculated 137 (H) 06/07/2016 07:39 AM    LDL, calculated 127 (H) 07/25/2014 09:24 AM    LDL, calculated 150 (H) 01/13/2014 08:54 AM    Triglyceride 119 02/26/2018 08:30 AM Triglyceride 93 04/20/2017 09:15 AM    Triglyceride 130 06/07/2016 07:39 AM    Triglyceride 138 07/25/2014 09:24 AM    Triglyceride 122 01/13/2014 08:54 AM     ASSESSMENT  Ms. Andressa Saini is stable and asymptomatic at this point, well compensated. We talked at length about symptoms that would prompt an urgent return visit here or a call to 911. Her recent echocardiogram was reassuringly normal.   I do think she would benefit from a statin, so we are going to try Crestor 10 mg a day and if she is successful, she has a lab slip to get blood work in about six weeks. current treatment plan is effective, no change in therapy  lab results and schedule of future lab studies reviewed with patient  reviewed diet, exercise and weight control    Encounter Diagnoses   Name Primary?  Palpitations Yes    Leg edema     Hypercholesteremia     Suspected sleep apnea      Orders Placed This Encounter    Omega-3 Fatty Acids (FISH OIL) 500 mg cap       Follow-up Disposition:  Return in about 3 months (around 8/8/2018).     Jamal Huerta MD  5/8/2018

## 2018-05-08 NOTE — MR AVS SNAPSHOT
[address] [facility] [address] [place] 
[phone] Patient: Raine Casas MRN: [id number] :1955 Visit Information Date & Time Provider Department Dept. Phone Encounter #  
 2018  4:20 PM French Luna MD [facility] Brandon [phone] [id number] Follow-up Instructions Return in about 3 months (around 2018). Your Appointments 2018  4:20 PM  
ESTABLISHED PATIENT with French Luna MD  
[facility] ([address]) Appt Note: 3 mo f/u per Dr. Megan Romero pt reqd Sept appt [facility] [address] [place]  [address] [place] [address]  
[address] [address] Upcoming Health Maintenance Date Due Influenza Age 5 to Adult 2018 PAP AKA CERVICAL CYTOLOGY 10/15/2018 BREAST CANCER SCRN MAMMOGRAM 2019 DTaP/Tdap/Td series (2 - Td) 2025 COLONOSCOPY 2025 Allergies as of 2018  Review Complete On: 2018 By: French Luna MD  
  
 Severity Noted Reaction Type Reactions Keflex [Cephalexin]  2017    Nausea Only Current Immunizations  Reviewed on 2016 Name Date Influenza Vaccine (Quad) PF 10/15/2015  9:38 AM  
 Influenza Vaccine PF 10/13/2014, 2013 12:31 PM  
 Influenza Vaccine Whole 2009 Pneumococcal Polysaccharide (PPSV-23) 2016 11:20 AM  
 Tdap 2015 Zoster Vaccine, Live 2015 Not reviewed this visit You Were Diagnosed With   
  
 Codes Comments Palpitations    -  Primary ICD-10-CM: R00.2 ICD-9-CM: 785.1 Leg edema     ICD-10-CM: R60.0 ICD-9-CM: 884. 3 Hypercholesteremia     ICD-10-CM: E78.00 ICD-9-CM: 272.0 Suspected sleep apnea     ICD-10-CM: R29.818 ICD-9-CM: 781.99 Vitals BP Pulse Resp Height(growth percentile) Weight(growth percentile) SpO2 130/90 (BP 1 Location: Left arm, BP Patient Position: Sitting) 82 16 5' 5\" (1.651 m) 183 lb (83 kg) 98% BMI OB Status Smoking Status 30.45 kg/m2 Hysterectomy Current Every Day Smoker BMI and BSA Data Body Mass Index Body Surface Area  
 30.45 kg/m 2 1.95 m 2 Preferred Pharmacy Pharmacy Name Phone [facility] [address] [facility] [phone] Your Updated Medication List  
  
   
This list is accurate as of 5/8/18  5:00 PM.  Always use your most recent med list. ALLEGRA PO Take 1 Tab by mouth daily as needed. aspirin delayed-release 81 mg tablet Take  by mouth daily. buPROPion  mg XL tablet Commonly known as:  Lowell Jc Take 1 Tab by mouth every morning. CINNAMON 500 mg Cap Generic drug:  cinnamon bark Take 2 Tabs by mouth daily. FISH  mg Cap Generic drug:  Omega-3 Fatty Acids Take 1,000 mg by mouth. ibuprofen 200 mg tablet Commonly known as:  MOTRIN Take 400 mg by mouth every six (6) hours as needed for Pain. rosuvastatin 10 mg tablet Commonly known as:  CRESTOR Take 1 Tab by mouth nightly. sertraline 100 mg tablet Commonly known as:  ZOLOFT Take 1.5 Tabs by mouth daily. VITAMIN D3 1,000 unit tablet Generic drug:  cholecalciferol Take 2,000 Units by mouth two (2) times a day. Prescriptions Sent to Pharmacy Refills  
 rosuvastatin (CRESTOR) 10 mg tablet 6 Sig: Take 1 Tab by mouth nightly. Class: Normal  
 Pharmacy: [facility] [facility] Ph #: [phone] Route: Oral  
  
We Performed the Following LIPID PANEL [63905 CPT(R)] METABOLIC PANEL, COMPREHENSIVE [02665 CPT(R)] Follow-up Instructions Return in about 3 months (around 8/8/2018). Introducing [facility]! Dear Yohana Ford: Thank you for requesting a [employer] account.   Our records indicate that you already have an active [employer] account. You can access your account anytime at [email or web address] [employer] Did you know that you can access your hospital and ER discharge instructions at any time in [employer]? You can also review all of your test results from your hospital stay or ER visit. Additional Information If you have questions, please visit the Frequently Asked Questions section of the [employer] website at [email or web address] [email or web address]/. Remember, [employer] is NOT to be used for urgent needs. For medical emergencies, dial 911. Now available from your iPhone and Android! Please provide this summary of care documentation to your next provider. Your primary care clinician is listed as Jayme Morgan. If you have any questions after today's visit, please call [phone].

## 2018-05-09 RX ORDER — SERTRALINE HYDROCHLORIDE 100 MG/1
TABLET, FILM COATED ORAL
Qty: 135 TAB | Refills: 2 | Status: SHIPPED | OUTPATIENT
Start: 2018-05-09 | End: 2019-04-02 | Stop reason: SDUPTHER

## 2018-06-19 ENCOUNTER — HOSPITAL ENCOUNTER (OUTPATIENT)
Dept: MAMMOGRAPHY | Age: 63
Discharge: HOME | End: 2018-06-19
Attending: INTERNAL MEDICINE
Payer: COMMERCIAL

## 2018-06-19 DIAGNOSIS — Z12.31 ENCOUNTER FOR SCREENING MAMMOGRAM FOR MALIGNANT NEOPLASM OF BREAST: ICD-10-CM

## 2018-06-19 PROCEDURE — 77067 SCR MAMMO BI INCL CAD: CPT

## 2018-07-11 DIAGNOSIS — R06.83 SNORING: Primary | ICD-10-CM

## 2018-07-11 DIAGNOSIS — E66.09 CLASS 1 OBESITY DUE TO EXCESS CALORIES WITHOUT SERIOUS COMORBIDITY WITH BODY MASS INDEX (BMI) OF 30.0 TO 30.9 IN ADULT: ICD-10-CM

## 2018-08-22 RX ORDER — BUPROPION HYDROCHLORIDE 300 MG/1
TABLET ORAL
Qty: 30 TAB | Refills: 0 | Status: SHIPPED | OUTPATIENT
Start: 2018-08-22 | End: 2018-10-31 | Stop reason: SDUPTHER

## 2018-09-26 ENCOUNTER — OFFICE VISIT (OUTPATIENT)
Dept: CARDIOLOGY CLINIC | Age: 63
End: 2018-09-26

## 2018-09-26 VITALS
DIASTOLIC BLOOD PRESSURE: 70 MMHG | OXYGEN SATURATION: 98 % | BODY MASS INDEX: 29.66 KG/M2 | WEIGHT: 178 LBS | SYSTOLIC BLOOD PRESSURE: 110 MMHG | HEART RATE: 88 BPM | RESPIRATION RATE: 16 BRPM | HEIGHT: 65 IN

## 2018-09-26 DIAGNOSIS — E78.00 HYPERCHOLESTEREMIA: Primary | ICD-10-CM

## 2018-09-26 DIAGNOSIS — E78.00 ELEVATED LDL CHOLESTEROL LEVEL: ICD-10-CM

## 2018-09-26 DIAGNOSIS — R29.818 SUSPECTED SLEEP APNEA: ICD-10-CM

## 2018-09-26 DIAGNOSIS — R60.0 LEG EDEMA: ICD-10-CM

## 2018-09-26 DIAGNOSIS — R00.2 PALPITATIONS: ICD-10-CM

## 2018-09-26 RX ORDER — GUAIFENESIN AND PHENYLEPHRINE HCL 400; 10 MG/1; MG/1
500 TABLET ORAL DAILY
COMMUNITY
End: 2018-10-29 | Stop reason: ALTCHOICE

## 2018-09-26 RX ORDER — ROSUVASTATIN CALCIUM 10 MG/1
10 TABLET, COATED ORAL
Qty: 90 TAB | Refills: 3 | Status: SHIPPED | OUTPATIENT
Start: 2018-09-26 | End: 2019-09-24 | Stop reason: SDUPTHER

## 2018-09-26 NOTE — PROGRESS NOTES
HISTORY OF PRESENT ILLNESS  Sheba Banks is a 60 y.o. female     SUMMARY:   Problem List  Date Reviewed: 9/26/2018          Codes Class Noted    Palpitations ICD-10-CM: R00.2  ICD-9-CM: 785.1  3/15/2018    Overview Signed 4/27/2018  7:38 AM by Derald Riedel, MD     3/18 sclerotic, non stenotic av otherwise normal echo             Leg edema ICD-10-CM: R60.0  ICD-9-CM: 782.3  3/15/2018        Incidental lung nodule ICD-10-CM: R91.1  ICD-9-CM: 793.11  9/26/2016    Overview Signed 9/26/2016  4:35 PM by Argentina Petersen     9/26/2016 L apex             Neurofibroma determined by biopsy of tongue ([facility] [address] ICD-10-CM: Q85.00  ICD-9-CM: 237.70  6/14/2016    Overview Signed 6/14/2016 12:23 PM by Leeann Lovelace MD     [facility] [address] [address] [facility] 11/2015              Prediabetes ICD-10-CM: R73.03  ICD-9-CM: 790.29  6/1/2016    Overview Signed 8/22/2016 11:18 AM by Argentina Petersen     A1C 6.1             Elevated LDL cholesterol level ICD-10-CM: E78.00  ICD-9-CM: 272.0  7/28/2014        Hypercholesteremia ICD-10-CM: E78.00  ICD-9-CM: 272.0  6/19/2014        ENIO (generalized anxiety disorder) ICD-10-CM: F41.1  ICD-9-CM: 300.02  4/14/2014    Overview Signed 1/26/2015  9:31 AM by Leeann Lovelace MD     Prior therapy:  Effexor. Lexapro.                Hypovitaminosis D ICD-10-CM: E55.9  ICD-9-CM: 268.9  4/14/2014        Tobacco dependence ICD-10-CM: F17.200  ICD-9-CM: 305.1  12/2/2013    Overview Signed 10/22/2015  9:04 AM by Leeann Lovelace MD     [facility] Oct 2015             Depression with anxiety ICD-10-CM: F41.8  ICD-9-CM: 300.4  12/2/2013        H/O colonoscopy ICD-10-CM: Z98.890  ICD-9-CM: V45.89  12/2/2013    Overview Addendum 10/15/2015  9:22 AM by Leeann Lovelace MD     June 5 2015 - Dr. Namrata Salazar - normal - f/u 10 yrs             Osteopenia ICD-10-CM: M85.80  ICD-9-CM: 733.90  12/2/2013    Overview Signed 12/2/2013 11:59 AM by Leeann Lovelace MD     12/2012 - BMD             Post-nasal drip ICD-10-CM: R09.82  ICD-9-CM: 784.91  12/2/2013              Current Outpatient Prescriptions on File Prior to Visit   Medication Sig    buPROPion XL (WELLBUTRIN XL) 300 mg XL tablet TAKE ONE TABLET DAILY.  sertraline (ZOLOFT) 100 mg tablet Take 1.5 tabs by mouth daily    aspirin delayed-release 81 mg tablet Take  by mouth daily.  ibuprofen (MOTRIN) 200 mg tablet Take 400 mg by mouth every six (6) hours as needed for Pain.  FEXOFENADINE HCL (ALLEGRA PO) Take 1 Tab by mouth daily as needed.  cinnamon bark (CINNAMON) 500 mg cap Take 2 Tabs by mouth daily.  cholecalciferol (VITAMIN D3) 1,000 unit tablet Take 2,000 Units by mouth two (2) times a day. No current facility-administered medications on file prior to visit. CARDIOLOGY STUDIES TO DATE:  3/18 sclerotic, non stenotic av otherwise normal echo           Chief Complaint   Patient presents with    Cholesterol Problem     HPI :  Ms. Yadira Cha is doing well. She is tolerating the Crestor so far. No further palpitations and no leg edema. She is exercising some, though not as much as she should be now that school started.        CARDIAC ROS:   negative for chest pain, dyspnea, syncope, orthopnea, paroxysmal nocturnal dyspnea, exertional chest pressure/discomfort, claudication    Family History   Problem Relation Age of Onset    Coronary Artery Disease Father      76, first episode late 63's    Dementia Father      Alzheimer's   [address] [place] [address] [place] Alzheimer Father     Other Brother      Porphyria    Arrhythmia Mother      Afib     Breast Cancer Mother 52    Stroke Sister      ? etiology    Anesth Problems Neg Hx        Past Medical History:   Diagnosis Date    Anxiety disorder     Carpal tunnel syndrome of left wrist     DDD (degenerative disc disease), thoracic 10/13/14    Depression     Hypercholesterolemia     Neurofibroma determined by biopsy of tongue ([facility] [address]     Dr. Sandra Mayo    Prediabetes 06/2016    A1C 6.1    Scoliosis 10/13/14    thoracic spine, mild       GENERAL ROS:  A comprehensive review of systems was negative except for that written in the HPI.     Visit Vitals    /70 (BP 1 Location: Left arm, BP Patient Position: Sitting)    Pulse 88    Resp 16    Ht 5' 5\" (1.651 m)    Wt 178 lb (80.7 kg)    SpO2 98%    BMI 29.62 kg/m2       Wt Readings from Last 3 Encounters:   09/26/18 178 lb (80.7 kg)   05/08/18 183 lb (83 kg)   03/16/18 181 lb 12.8 oz (82.5 kg)            BP Readings from Last 3 Encounters:   09/26/18 110/70   05/08/18 130/90   03/16/18 120/88       PHYSICAL EXAM  General appearance: alert, cooperative, no distress, appears stated age  Neurologic: Alert and oriented X 3  Neck: supple, symmetrical, trachea midline, no adenopathy, no carotid bruit and no JVD  Lungs: clear to auscultation bilaterally  Heart: regular rate and rhythm, S1, S2 normal, no murmur, click, rub or gallop  Extremities: extremities normal, atraumatic, no cyanosis or edema    Lab Results   Component Value Date/Time    Cholesterol, total 222 (H) 02/26/2018 08:30 AM    Cholesterol, total 149 04/20/2017 09:15 AM    Cholesterol, total 216 (H) 06/07/2016 07:39 AM    Cholesterol, total 210 (H) 07/25/2014 09:24 AM    Cholesterol, total 230 (H) 01/13/2014 08:54 AM    HDL Cholesterol 52 02/26/2018 08:30 AM    HDL Cholesterol 56 04/20/2017 09:15 AM    HDL Cholesterol 53 06/07/2016 07:39 AM    HDL Cholesterol 55 07/25/2014 09:24 AM    HDL Cholesterol 56 01/13/2014 08:54 AM    LDL, calculated 146 (H) 02/26/2018 08:30 AM    LDL, calculated 74 04/20/2017 09:15 AM    LDL, calculated 137 (H) 06/07/2016 07:39 AM    LDL, calculated 127 (H) 07/25/2014 09:24 AM    LDL, calculated 150 (H) 01/13/2014 08:54 AM    Triglyceride 119 02/26/2018 08:30 AM    Triglyceride 93 04/20/2017 09:15 AM    Triglyceride 130 06/07/2016 07:39 AM    Triglyceride 138 07/25/2014 09:24 AM    Triglyceride 122 01/13/2014 08:54 AM     ASSESSMENT  Ms. Faith Goldsmith is stable, asymptomatic and well compensated on a good medical regimen and needs no cardiac testing at this time. We are going to send her for a fasting lipid profile and then she will follow up going forward with Dr. Arnoldo Bruno. current treatment plan is effective, no change in therapy  lab results and schedule of future lab studies reviewed with patient  reviewed diet, exercise and weight control    Encounter Diagnoses   Name Primary?  Hypercholesteremia Yes    Leg edema     Palpitations     Elevated LDL cholesterol level     Suspected sleep apnea      Orders Placed This Encounter    LIPID PANEL    METABOLIC PANEL, COMPREHENSIVE    turmeric root extract 500 mg cap    rosuvastatin (CRESTOR) 10 mg tablet       Follow-up Disposition:  Return if symptoms worsen or fail to improve.     Ashia Gonzalez MD  9/26/2018

## 2018-09-26 NOTE — MR AVS SNAPSHOT
[address] [facility] [address] [facility] 
[phone] Patient: Winifred Hernandez MRN: [id number] :1955 Visit Information Date & Time Provider Department Dept. Phone Encounter #  
 2018  3:20 PM Laureano Bhakta MD [facility] Yumiko Handy [phone] [id number] Follow-up Instructions Return in about 6 months (around 3/26/2019). Your Appointments 2018  4:20 PM  
New Patient with Devante Gaviria MD  
[address] [facility] ([facility]) Appt Note: NP, refd by Dr. Ángel Martell, snoring, obesity [address] [facility] [address] Upcoming Health Maintenance Date Due Shingrix Vaccine Age 50> (1 of 2) 2004 Influenza Age 5 to Adult 2018 PAP AKA CERVICAL CYTOLOGY 10/15/2018 BREAST CANCER SCRN MAMMOGRAM 2020 DTaP/Tdap/Td series (2 - Td) 2025 COLONOSCOPY 2025 Allergies as of 2018  Review Complete On: 2018 By: Laureano Bhakta MD  
  
 Severity Noted Reaction Type Reactions Keflex [Cephalexin]  2017    Nausea Only Current Immunizations  Reviewed on 2016 Name Date Influenza Vaccine (Quad) PF 10/15/2015  9:38 AM  
 Influenza Vaccine PF 10/13/2014, 2013 12:31 PM  
 Influenza Vaccine Whole 2009 Pneumococcal Polysaccharide (PPSV-23) 2016 11:20 AM  
 Tdap 2015 Zoster Vaccine, Live 2015 Not reviewed this visit You Were Diagnosed With   
  
 Codes Comments Hypercholesteremia    -  Primary ICD-10-CM: E78.00 ICD-9-CM: 272.0 Leg edema     ICD-10-CM: R60.0 ICD-9-CM: 749. 3 Palpitations     ICD-10-CM: R00.2 ICD-9-CM: 785.1 Elevated LDL cholesterol level     ICD-10-CM: E78.00 ICD-9-CM: 272.0 Suspected sleep apnea     ICD-10-CM: R29.818 ICD-9-CM: 781.99   
  
 Vitals BP Pulse Resp Height(growth percentile) Weight(growth percentile) SpO2  
 110/70 (BP 1 Location: Left arm, BP Patient Position: Sitting) 88 16 5' 5\" (1.651 m) 178 lb (80.7 kg) 98% BMI OB Status Smoking Status 29.62 kg/m2 Hysterectomy Current Every Day Smoker BMI and BSA Data Body Mass Index Body Surface Area  
 29.62 kg/m 2 1.92 m 2 Preferred Pharmacy Pharmacy Name Phone [facility] [address] [place] [address] [phone] Your Updated Medication List  
  
   
This list is accurate as of 9/26/18  4:51 PM.  Always use your most recent med list. ALLEGRA PO Take 1 Tab by mouth daily as needed. aspirin delayed-release 81 mg tablet Take  by mouth daily. buPROPion  mg XL tablet Commonly known as:  WELLBUTRIN XL  
TAKE ONE TABLET DAILY. CINNAMON 500 mg Cap Generic drug:  cinnamon bark Take 2 Tabs by mouth daily. ibuprofen 200 mg tablet Commonly known as:  MOTRIN Take 400 mg by mouth every six (6) hours as needed for Pain. rosuvastatin 10 mg tablet Commonly known as:  CRESTOR Take 1 Tab by mouth nightly. sertraline 100 mg tablet Commonly known as:  ZOLOFT Take 1.5 tabs by mouth daily  
  
 turmeric root extract 500 mg Cap Take 500 mg by mouth daily. VITAMIN D3 1,000 unit tablet Generic drug:  cholecalciferol Take 2,000 Units by mouth two (2) times a day. Prescriptions Sent to Pharmacy Refills  
 rosuvastatin (CRESTOR) 10 mg tablet 3 Sig: Take 1 Tab by mouth nightly. Class: Normal  
 Pharmacy: [place] [facility] [address] [address] [place] [address] Ph #: [phone] Route: Oral  
  
We Performed the Following LIPID PANEL [85684 CPT(R)] METABOLIC PANEL, COMPREHENSIVE [38849 CPT(R)] Follow-up Instructions Return in about 6 months (around 3/26/2019). Introducing [facility]! Dear Efraín Dear: Thank you for requesting a [employer] account. Our records indicate that you already have an active [employer] account. You can access your account anytime at [email or web address] Did you know that you can access your hospital and ER discharge instructions at any time in [employer]? You can also review all of your test results from your hospital stay or ER visit. Additional Information If you have questions, please visit the Frequently Asked Questions section of the [employer] website at [email or web address] Remember, [employer] is NOT to be used for urgent needs. For medical emergencies, dial 911. Now available from your iPhone and Android! Please provide this summary of care documentation to your next provider. Your primary care clinician is listed as Rudi Castillo. If you have any questions after today's visit, please call [phone].

## 2018-10-29 ENCOUNTER — OFFICE VISIT (OUTPATIENT)
Dept: INTERNAL MEDICINE CLINIC | Age: 63
End: 2018-10-29

## 2018-10-29 VITALS
BODY MASS INDEX: 30.66 KG/M2 | SYSTOLIC BLOOD PRESSURE: 124 MMHG | TEMPERATURE: 98.4 F | DIASTOLIC BLOOD PRESSURE: 84 MMHG | OXYGEN SATURATION: 98 % | WEIGHT: 184 LBS | HEIGHT: 65 IN | HEART RATE: 80 BPM | RESPIRATION RATE: 14 BRPM

## 2018-10-29 DIAGNOSIS — R22.31 AXILLARY MASS, RIGHT: Primary | ICD-10-CM

## 2018-10-29 NOTE — PROGRESS NOTES
Queen Skiff is a 60 y.o. female who was seen in clinic today (10/29/2018). Assessment & Plan:   Diagnoses and all orders for this visit:    1. Axillary mass, right- this is a new problem, symptoms are: unchanged and she is asymptomatic, differential dx reviewed with the patient, favor a cyst (etiology is unclear). Reassured this is not consistent w/ cancer or infection. Will monitor for now. Red flags were reviewed with the patient to RTC or notify me, expected time course for resolution reviewed. Follow-up Disposition:  Return if symptoms worsen or fail to improve. Subjective:   Jennie Ponce was seen today for Skin Problem    Dermatology Review  She is here to talk about a mass. She noticed it 2 days ago, with no changes since that time. Location: right axilla. Symptoms include as mass. There is not pain. No h/o trauma. She does report a new skin tag in that same are. Treatment to date has included none. Brief Labs:     Lab Results   Component Value Date/Time    Sodium 139 05/02/2018 03:54 PM    Potassium 4.3 05/02/2018 03:54 PM    Creatinine 0.97 05/02/2018 03:54 PM    Cholesterol, total 222 02/26/2018 08:30 AM    HDL Cholesterol 52 02/26/2018 08:30 AM    LDL, calculated 146 02/26/2018 08:30 AM    Triglyceride 119 02/26/2018 08:30 AM    Hemoglobin A1c 5.8 02/26/2018 08:30 AM    TSH 4.430 02/26/2018 08:30 AM          Prior to Admission medications    Medication Sig Start Date End Date Taking? Authorizing Provider   naproxen sodium (ALEVE PO) Take  by mouth as needed. Yes Provider, Historical   rosuvastatin (CRESTOR) 10 mg tablet Take 1 Tab by mouth nightly. 9/26/18  Yes Jazmyne Meyer MD   buPROPion XL (WELLBUTRIN XL) 300 mg XL tablet TAKE ONE TABLET DAILY.  8/22/18  Yes Tavia Will MD   sertraline (ZOLOFT) 100 mg tablet Take 1.5 tabs by mouth daily 5/9/18  Yes Tavia Will MD   ibuprofen (MOTRIN) 200 mg tablet Take 400 mg by mouth every six (6) hours as needed for Pain. Yes Provider, Historical   FEXOFENADINE HCL (ALLEGRA PO) Take 1 Tab by mouth daily as needed. Yes Provider, Historical   cinnamon bark (CINNAMON) 500 mg cap Take 2 Tabs by mouth daily. Yes Provider, Historical   cholecalciferol (VITAMIN D3) 1,000 unit tablet Take 2,000 Units by mouth two (2) times a day. Yes Provider, Historical   aspirin delayed-release 81 mg tablet Take  by mouth daily. Provider, Historical          Allergies   Allergen Reactions    Keflex [Cephalexin] Nausea Only           ROS - per HPI       Objective:   Physical Exam   Constitutional: No distress. Cardiovascular: Regular rhythm and normal heart sounds. No murmur heard. Pulmonary/Chest: Effort normal and breath sounds normal. She has no wheezes. She has no rales. Lymphadenopathy:        Right cervical: No superficial cervical, no deep cervical and no posterior cervical adenopathy present. Right axillary: No pectoral and no lateral adenopathy present. Skin:   R axilla there is a cutaneous mass, it is firm, mobile, it is small (2-3mm), no erythema, no open lesions         Visit Vitals  /84   Pulse 80   Temp 98.4 °F (36.9 °C) (Oral)   Resp 14   Ht 5' 5\" (1.651 m)   Wt 184 lb (83.5 kg)   SpO2 98%   BMI 30.62 kg/m²         Disclaimer:  Advised her to call back or return to office if symptoms worsen/change/persist.  Discussed expected course/resolution/complications of diagnosis in detail with patient. Medication risks/benefits/costs/interactions/alternatives discussed with patient. She was given an after visit summary which includes diagnoses, current medications, & vitals. She expressed understanding with the diagnosis and plan. Aspects of this note may have been generated using voice recognition software. Despite editing, there may be some syntax errors.        Ania Canales MD

## 2018-11-19 ENCOUNTER — OFFICE VISIT (OUTPATIENT)
Dept: INTERNAL MEDICINE CLINIC | Age: 63
End: 2018-11-19

## 2018-11-19 VITALS
TEMPERATURE: 98.9 F | BODY MASS INDEX: 30.69 KG/M2 | WEIGHT: 184.2 LBS | SYSTOLIC BLOOD PRESSURE: 116 MMHG | HEART RATE: 90 BPM | RESPIRATION RATE: 18 BRPM | DIASTOLIC BLOOD PRESSURE: 78 MMHG | HEIGHT: 65 IN | OXYGEN SATURATION: 97 %

## 2018-11-19 DIAGNOSIS — G89.29 CHRONIC LEFT SHOULDER PAIN: ICD-10-CM

## 2018-11-19 DIAGNOSIS — M25.512 CHRONIC LEFT SHOULDER PAIN: ICD-10-CM

## 2018-11-19 DIAGNOSIS — E78.00 HYPERCHOLESTEREMIA: ICD-10-CM

## 2018-11-19 DIAGNOSIS — F41.1 GAD (GENERALIZED ANXIETY DISORDER): Primary | ICD-10-CM

## 2018-11-19 DIAGNOSIS — L72.9 SKIN CYST: ICD-10-CM

## 2018-11-19 DIAGNOSIS — F17.200 TOBACCO DEPENDENCE: ICD-10-CM

## 2018-11-19 DIAGNOSIS — F41.8 DEPRESSION WITH ANXIETY: ICD-10-CM

## 2018-11-19 RX ORDER — BUPROPION HYDROCHLORIDE 300 MG/1
TABLET ORAL
Qty: 30 TAB | Refills: 5 | COMMUNITY
Start: 2018-11-19 | End: 2019-10-16

## 2018-11-19 RX ORDER — BUPROPION HYDROCHLORIDE 300 MG/1
TABLET ORAL
Qty: 30 TAB | Refills: 5 | Status: SHIPPED | OUTPATIENT
Start: 2018-11-19 | End: 2018-11-19

## 2018-11-19 NOTE — PROGRESS NOTES
HISTORY OF PRESENT ILLNESS Ryan David is a 60 y.o. female. HPI Memory Loss Seen Dr. Valentino Hernandez and had neuropsychiatry testing which did not support memory loss and cognitive deficit. She has sleep consultation on . Depression and anxiety Patient is seen for followup of depression and anxiety. Treatment includes Zoloft, Wellbutrin and no other therapies. she denies suicidal thoughts with specific plan. she experiences the following side effects from the treatment: none. Cardiovascular Review: 
The patient has hyperlipidemia and tobacco dependence. Diet and Lifestyle: exercises regularly, smoker relapsed due to stress, reduced carbohydrate diet, high sugar/ high carbohydrate diet, Drinks soda/ sweetened beverages frequently Home BP Monitoring: is not measured at home. Pertinent ROS: no TIA's, no chest pain on exertion, no dyspnea on exertion, no swelling of ankles, taking medications as instructed\",no medication side effects noted. PHQ over the last two weeks 3/2/2018 Little interest or pleasure in doing things Not at all Feeling down, depressed, irritable, or hopeless Not at all Total Score PHQ 2 0 SHx: mother  at 89yo, 9 months ago ROSper HPI Patient Active Problem List  
 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 30 Tab 5  naproxen sodium (ALEVE PO) Take  by mouth as needed.  rosuvastatin (CRESTOR) 10 mg tablet Take 1 Tab by mouth nightly. 90 Tab 3  
 sertraline (ZOLOFT) 100 mg tablet Take 1.5 tabs by mouth daily (Patient taking differently: Take 150 mg by mouth daily. Take 1.5 tabs by mouth daily) 135 Tab 2  
 aspirin delayed-release 81 mg tablet Take  by mouth daily.  ibuprofen (MOTRIN) 200 mg tablet Take 400 mg by mouth every six (6) hours as needed for Pain.  FEXOFENADINE HCL (ALLEGRA PO) Take 1 Tab by mouth daily as needed.  cinnamon bark (CINNAMON) 500 mg cap Take 2 Tabs by mouth daily.  cholecalciferol (VITAMIN D3) 1,000 unit tablet Take 2,000 Units by mouth two (2) times a day. Allergies Allergen Reactions  Keflex [Cephalexin] Nausea Only Visit Vitals /78 (BP 1 Location: Left arm, BP Patient Position: Sitting) Pulse 90 Temp 98.9 °F (37.2 °C) (Oral) Resp 18 Ht 5' 5\" (1.651 m) Wt 184 lb 3.2 oz (83.6 kg) SpO2 97% BMI 30.65 kg/m² Physical Exam  
Constitutional: She is oriented to person, place, and time. No distress. Cardiovascular: Normal rate and regular rhythm. Pulmonary/Chest: Breath sounds normal. No respiratory distress. She has no wheezes. She has no rales. Musculoskeletal: She exhibits no edema. Left shoulder: She exhibits tenderness (focal healing ecchymosis ). She exhibits no bony tenderness, no swelling and no effusion. Cervical back: She exhibits no tenderness, no bony tenderness and no swelling. Back: 
 
Neurological: She is alert and oriented to person, place, and time. ASSESSMENT and PLAN Diagnoses and all orders for this visit: 1. ENIO (generalized anxiety disorder)- would benefit from seeing a therapist. See AVS for full details of plan and patient discussion. Patient Education:  Reviewed concept of anxiety as biochemical imbalance of neurotransmitters and rationale for treatment.  Instructed patient to contact office or 911 promptly should condition worsen or any new symptoms appear and provided on-call telephone numbers. IF THE PATIENT HAS ANY SUICIDAL OR HOMICIDAL IDEATION, CALL THE OFFICE, DISCUSS WITH A SUPPORT MEMBER OR GO TO THE ER IMMEDIATELY. Patient was agreeable with this 2. Depression with anxiety 3. Hypercholesteremia- continue current regimen. Work on smoking cessation and TLC optimization. 4. Tobacco dependence- relapse. Optimize stress management via therapy. 5. Skin cyst- benign appearance. Can go to derm if concerned now. Red flags to warrant ER or earlier clinical evaluation reviewed. 6. Chronic left shoulder pain- RICE would benefit from Sports med evaluation.  
-     REFERRAL TO SPORTS MEDICINE Follow-up Disposition: 
Return if symptoms worsen or fail to improve before scheduled appointment . Medication risks/benefits/costs/interactions/alternatives discussed with patient. Timi Shah  was given an after visit summary which includes diagnoses, current medications, & vitals. she expressed understanding with the diagnosis and plan.

## 2018-11-19 NOTE — PROGRESS NOTES
Roverto Ornelas is a 60 y.o. female Chief Complaint Patient presents with  Follow-up  
  cognitive exam results 1. Have you been to the ER, urgent care clinic since your last visit? Hospitalized since your last visit? No 
 
2. Have you seen or consulted any other health care providers outside of the [address] [place] [address] since your last visit? Include any pap smears or colon screening.  No

## 2018-12-05 ENCOUNTER — OFFICE VISIT (OUTPATIENT)
Dept: SLEEP MEDICINE | Age: 63
End: 2018-12-05

## 2018-12-05 VITALS
BODY MASS INDEX: 30.66 KG/M2 | HEART RATE: 82 BPM | DIASTOLIC BLOOD PRESSURE: 77 MMHG | SYSTOLIC BLOOD PRESSURE: 115 MMHG | OXYGEN SATURATION: 97 % | WEIGHT: 184 LBS | HEIGHT: 65 IN

## 2018-12-05 DIAGNOSIS — E66.9 OBESITY (BMI 30.0-34.9): ICD-10-CM

## 2018-12-05 DIAGNOSIS — G47.33 OBSTRUCTIVE SLEEP APNEA (ADULT) (PEDIATRIC): Primary | ICD-10-CM

## 2018-12-05 NOTE — PROGRESS NOTES
[address] [facility] [place]. [place], [address]  Tel.  [phone]  Fax. 100 [facility] 60  [place], [address] [facility]  Tel.  [phone]  Fax. [phone] [address] [facility] Narinder Mckeon   Tel.  [phone]  Fax. [phone]       S>Glendy Edwards is a 58 y.o. female seen today to receive a home sleep testing unit (HST). · Patient was educated on proper hookup and operation of the HST. · Instruction forms and documentation were reviewed and signed. · The patient demonstrated good understanding of the HST. O>    Visit Vitals  /77 (BP 1 Location: Left arm, BP Patient Position: Sitting)   Pulse 82   Ht 5' 5\" (1.651 m)   Wt 184 lb (83.5 kg)   SpO2 97%   BMI 30.62 kg/m²    Neck circ. in \"inches\": 15    A>  1. Obstructive sleep apnea (adult) (pediatric)    2. Obesity (BMI 30.0-34. 9)          P>  · General information regarding operations and maintenance of the device was provided. · She was provided information on sleep apnea including coresponding risk factors and the importance of proper treatment. · Follow-up appointment was made to return the HST. She will be contacted once the results have been reviewed. · She was asked to contact our office for any problems regarding her home sleep test study.

## 2018-12-05 NOTE — PROGRESS NOTES
217 [facility]. [place], [address]  Tel.  [phone]  Fax. 100 [facility] 60  [place], [address] [facility]  Tel.  [phone]  Fax. [phone] 9250 [facility] Jam[facility]   Tel.  [phone]  Fax. [phone]       S>Glendy Horowitz is a 58 y.o. female seen today to receive a home sleep testing unit (HST). · Patient was educated on proper hookup and operation of the HST. · Instruction forms and documentation were reviewed and signed. · The patient demonstrated good understanding of the HST. O>    Visit Vitals  /77 (BP 1 Location: Left arm, BP Patient Position: Sitting)   Pulse 82   Ht 5' 5\" (1.651 m)   Wt 184 lb (83.5 kg)   SpO2 97%   BMI 30.62 kg/m²    Neck circ. in \"inches\": 15    A>  1. Obstructive sleep apnea (adult) (pediatric)          P>  · General information regarding operations and maintenance of the device was provided. · She was provided information on sleep apnea including coresponding risk factors and the importance of proper treatment. · Follow-up appointment was made to return the HST. She will be contacted once the results have been reviewed. · She was asked to contact our office for any problems regarding her home sleep test study.

## 2018-12-05 NOTE — PATIENT INSTRUCTIONS
[address] [facility] [address] [place]. [place], [address]  Tel.  [phone]  Fax. 100 [facility] 60  [place], [address] [facility]  Tel.  [phone]  Fax. [phone] 9250 [facility] Narinder Mckeon  Tel.  [phone]  Fax. [phone]     Sleep Apnea: After Your Visit  Your Care Instructions  Sleep apnea occurs when you frequently stop breathing for 10 seconds or longer during sleep. It can be mild to severe, based on the number of times per hour that you stop breathing or have slowed breathing. Blocked or narrowed airways in your nose, mouth, or throat can cause sleep apnea. Your airway can become blocked when your throat muscles and tongue relax during sleep. Sleep apnea is common, occurring in 1 out of 20 individuals. Individuals having any of the following characteristics should be evaluated and treated right away due to high risk and detrimental consequences from untreated sleep apnea:  1. Obesity  2. Congestive Heart failure  3. Atrial Fibrillation  4. Uncontrolled Hypertension  5. Type II Diabetes  6. Night-time Arrhythmias  7. Stroke  8. Pulmonary Hypertension  9. High-risk Driving Populations (pilots, truck drivers, etc.)  10. Patients Considering Weight-loss Surgery    How do you know you have sleep apnea? You probably have sleep apnea if you answer 'yes' to 3 or more of the following questions:  S - Have you been told that you Snore? T - Are you often Tired during the day? O - Has anyone Observed you stop breathing while sleeping? P- Do you have (or are being treated for) high blood Pressure? B - Are you obese (Body Mass Index > 35)? A - Is your Age 48years old or older? N - Is your Neck size greater than 16 inches? G - Are you male Gender? A sleep physician can prescribe a breathing device that prevents tissues in the throat from blocking your airway.  Or your doctor may recommend using a dental device (oral breathing device) to help keep your airway open. In some cases, surgery may be needed to remove enlarged tissues in the throat. Follow-up care is a key part of your treatment and safety. Be sure to make and go to all appointments, and call your doctor if you are having problems. It's also a good idea to know your test results and keep a list of the medicines you take. How can you care for yourself at home? · Lose weight, if needed. It may reduce the number of times you stop breathing or have slowed breathing. · Go to bed at the same time every night. · Sleep on your side. It may stop mild apnea. If you tend to roll onto your back, sew a pocket in the back of your pajama top. Put a tennis ball into the pocket, and stitch the pocket shut. This will help keep you from sleeping on your back. · Avoid alcohol and medicines such as sleeping pills and sedatives before bed. · Do not smoke. Smoking can make sleep apnea worse. If you need help quitting, talk to your doctor about stop-smoking programs and medicines. These can increase your chances of quitting for good. · Prop up the head of your bed 4 to 6 inches by putting bricks under the legs of the bed. · Treat breathing problems, such as a stuffy nose, caused by a cold or allergies. · Use a continuous positive airway pressure (CPAP) breathing machine if lifestyle changes do not help your apnea and your doctor recommends it. The machine keeps your airway from closing when you sleep. · If CPAP does not help you, ask your doctor whether you should try other breathing machines. A bilevel positive airway pressure machine has two types of air pressureâone for breathing in and one for breathing out. Another device raises or lowers air pressure as needed while you breathe. · If your nose feels dry or bleeds when using one of these machines, talk with your doctor about increasing moisture in the air. A humidifier may help.   · If your nose is runny or stuffy from using a breathing machine, talk with your doctor about using decongestants or a corticosteroid nasal spray. When should you call for help? Watch closely for changes in your health, and be sure to contact your doctor if:  · You still have sleep apnea even though you have made lifestyle changes. · You are thinking of trying a device such as CPAP. · You are having problems using a CPAP or similar machine. Where can you learn more? Go to [employer]. Enter G453 in the search box to learn more about \"Sleep Apnea: After Your Visit. \"   © 0859-7127 Healthwise, Incorporated. Care instructions adapted under license by [facility] [employer] (which disclaims liability or warranty for this information). This care instruction is for use with your licensed healthcare professional. If you have questions about a medical condition or this instruction, always ask your healthcare professional. Boris Chris any warranty or liability for your use of this information. PROPER SLEEP HYGIENE    What to avoid  · Do not have drinks with caffeine, such as coffee or black tea, for 8 hours before bed. · Do not smoke or use other types of tobacco near bedtime. Nicotine is a stimulant and can keep you awake. · Avoid drinking alcohol late in the evening, because it can cause you to wake in the middle of the night. · Do not eat a big meal close to bedtime. If you are hungry, eat a light snack. · Do not drink a lot of water close to bedtime, because the need to urinate may wake you up during the night. · Do not read or watch TV in bed. Use the bed only for sleeping and sexual activity. What to try  · Go to bed at the same time every night, and wake up at the same time every morning. Do not take naps during the day. · Keep your bedroom quiet, dark, and cool. · Get regular exercise, but not within 3 to 4 hours of your bedtime. .  · Sleep on a comfortable pillow and mattress.   · If watching the clock makes you anxious, turn it facing away from you so you cannot see the time. · If you worry when you lie down, start a worry book. Well before bedtime, write down your worries, and then set the book and your concerns aside. · Try meditation or other relaxation techniques before you go to bed. · If you cannot fall asleep, get up and go to another room until you feel sleepy. Do something relaxing. Repeat your bedtime routine before you go to bed again. · Make your house quiet and calm about an hour before bedtime. Turn down the lights, turn off the TV, log off the computer, and turn down the volume on music. This can help you relax after a busy day. Drowsy Driving  The [address] [place] [address] Road Traffic Safety Administration cites drowsiness as a causing factor in more than 656,803 police reported crashes annually, resulting in 76,000 injuries and 1,500 deaths. Other surveys suggest 55% of people polled have driven while drowsy in the past year, 23% had fallen asleep but not crashed, 3% crashed, and 2% had and accident due to drowsy driving. Who is at risk? Young Drivers: One study of drowsy driving accidents states that 55% of the drivers were under 25 years. Of those, 75% were male. Shift Workers and Travelers: People who work overnight or travel across time zones frequently are at higher risk of experiencing Circadian Rhythm Disorders. They are trying to work and function when their body is programed to sleep. Sleep Deprived: Lack of sleep has a serious impact on your ability to pay attention or focus on a task. Consistently getting less than the average of 8 hours your body needs creates partial or cumulative sleep deprivation. Untreated Sleep Disorders: Sleep Apnea, Narcolepsy, R.L.S., and other sleep disorders (untreated) prevent a person from getting enough restful sleep. This leads to excessive daytime sleepiness and increases the risk for drowsy driving accidents by up to 7 times.   Medications / Alcohol: Even over the counter medications can cause drowsiness. Medications that impair a drivers attention should have a warning label. Alcohol naturally makes you sleepy and on its own can cause accidents. Combined with excessive drowsiness its effects are amplified. Signs of Drowsy Driving:   * You don't remember driving the last few miles   * You may drift out of your leslie   * You are unable to focus and your thoughts wander   * You may yawn more often than normal   * You have difficulty keeping your eyes open / nodding off   * Missing traffic signs, speeding, or tailgating  Prevention-   Good sleep hygiene, lifestyle and behavioral choices have the most impact on drowsy driving. There is no substitute for sleep and the average person requires 8 hours nightly. If you find yourself driving drowsy, stop and sleep. Consider the sleep hygiene tips provided during your visit as well. Medication Refill Policy: Refills for all medications require 1 week advance notice. Please have your pharmacy fax a refill request. We are unable to fax, or call in \"controled substance\" medications and you will need to pick these prescriptions up from our office. LLLer Activation    Thank you for requesting access to LLLer. Please follow the instructions below to securely access and download your online medical record. LLLer allows you to send messages to your doctor, view your test results, renew your prescriptions, schedule appointments, and more. How Do I Sign Up? 1. In your internet browser, go to [email or web address]. 2. Click on the First Time User? Click Here link in the Sign In box. You will see the New Member Sign Up page. 3. Enter your LLLer Access Code exactly as it appears below. You will not need to use this code after youve completed the sign-up process. If you do not sign up before the expiration date, you must request a new code. LLLer Access Code:  Activation code not generated  Current LLLer Status: Active (This is the date your Chipolo access code will )    4. Enter the last four digits of your Social Security Number (xxxx) and Date of Birth (mm/dd/yyyy) as indicated and click Submit. You will be taken to the next sign-up page. 5. Create a Ansiblet ID. This will be your Chipolo login ID and cannot be changed, so think of one that is secure and easy to remember. 6. Create a Chipolo password. You can change your password at any time. 7. Enter your Password Reset Question and Answer. This can be used at a later time if you forget your password. 8. Enter your e-mail address. You will receive e-mail notification when new information is available in [address] 9. Click Sign Up. You can now view and download portions of your medical record. 10. Click the Download Summary menu link to download a portable copy of your medical information. Additional Information    If you have questions, please call [phone]. Remember, Chipolo is NOT to be used for urgent needs. For medical emergencies, dial 911.

## 2018-12-05 NOTE — PROGRESS NOTES
217 [facility] [place]. [place], [address]  Tel.  [phone]  Fax. 100 [facility] 60  [place], [address] [facility]  Tel.  [phone]  Fax. [phone] [facility] Narinder Mckeon   Tel.  [phone]  Fax. [phone]         Subjective:      Michael Cha is an 58 y.o. female referred for evaluation for a sleep disorder. She complains of snoring associated with excessive daytime sleepiness. Symptoms began 1 year ago, gradually worsening since that time. She usually can fall asleep in 5 minutes. Family or house members note snoring. She denies falling asleep while driving. Michael Cha does wake up frequently at night. She is not bothered by waking up too early and left unable to get back to sleep. She actually sleeps about 6 hours at night and wakes up about 1 times during the night. She does not work shifts:  .   Vergia Flavin indicates she does get too little sleep at night. Her bedtime is 2330. She awakens at 36. She does take naps. She takes 5 naps a week lasting 45 min to an hour. She has the following observed behaviors: Loud snoring, Grinding teeth;  . 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 Allergies   Allergen Reactions    Keflex [Cephalexin] Nausea Only         Current Outpatient Medications:     buPROPion XL (WELLBUTRIN XL) 300 mg XL tablet, TAKE ONE TABLET DAILY. , Disp: 30 Tab, Rfl: 5    naproxen sodium (ALEVE PO), Take  by mouth as needed. , Disp: , Rfl:     rosuvastatin (CRESTOR) 10 mg tablet, Take 1 Tab by mouth nightly., Disp: 90 Tab, Rfl: 3    sertraline (ZOLOFT) 100 mg tablet, Take 1.5 tabs by mouth daily (Patient taking differently: Take 150 mg by mouth daily. Take 1.5 tabs by mouth daily), Disp: 135 Tab, Rfl: 2    aspirin delayed-release 81 mg tablet, Take  by mouth daily. , Disp: , Rfl:     ibuprofen (MOTRIN) 200 mg tablet, Take 400 mg by mouth every six (6) hours as needed for Pain., Disp: , Rfl:     FEXOFENADINE HCL (ALLEGRA PO), Take 1 Tab by mouth daily as needed. , Disp: , Rfl:     cinnamon bark (CINNAMON) 500 mg cap, Take 2 Tabs by mouth daily. , Disp: , Rfl:     cholecalciferol (VITAMIN D3) 1,000 unit tablet, Take 2,000 Units by mouth two (2) times a day., Disp: , Rfl:      She  has a past medical history of Anxiety disorder, Carpal tunnel syndrome of left wrist, DDD (degenerative disc disease), thoracic, Depression, Hypercholesterolemia, Neurofibroma determined by biopsy of tongue ([facility] [address] Prediabetes, and Scoliosis. She  has a past surgical history that includes hx alexx and bso (); hx  section (1984); hx orthopaedic (Left); and RIGHT LARYNGOSCOPY AND BIOPSY (N/A, 10/27/2015). She family history includes Alzheimer in her father; Arrhythmia in her mother; Breast Cancer (age of onset: 52) in her mother; Coronary Artery Disease in her father; Dementia in her father; Other in her brother; Stroke in her sister. She  reports that she has been smoking. She has a 7.50 pack-year smoking history. she has never used smokeless tobacco. She reports that she drinks alcohol. She reports that she does not use drugs. Review of Systems:  Constitutional:  +weight gain. Eyes:  No blurred vision.   CVS:  No significant chest pain  Pulm:  No significant shortness of breath  GI:  No significant nausea or vomiting  :  No significant nocturia  Musculoskeletal:  + occasional significant joint pain at night  Skin:  No significant rashes  Neuro:  No significant dizziness   Psych:  Treated for depression well controlled     Sleep Review of Systems: notable for no difficulty falling asleep; +frequent awakenings at night;  rare dreaming noted; no nightmares ; no early morning headaches; + memory problems; + concentration issues; no history of any automobile or occupational accidents due to daytime drowsiness. Objective:     Visit Vitals  /77 (BP 1 Location: Left arm, BP Patient Position: Sitting)   Pulse 82   Ht 5' 5\" (1.651 m)   Wt 184 lb (83.5 kg)   SpO2 97%   BMI 30.62 kg/m²         General:   Not in acute distress   Eyes:  Anicteric sclerae, no obvious strabismus   Nose:  No obvious nasal septum deviation    Oropharynx:   Class 3 oropharyngeal outlet, thick tongue base, enlarged and boggy uvula, low-lying soft palate, narrow tonsilo-pharyngeal pilars   Tonsils:   tonsils are present and normal   Neck:   Neck circ. in \"inches\": 15; midline trachea   Chest/Lungs:  Equal lung expansion, clear on auscultation    CVS:  Normal rate, regular rhythm; no JVD   Skin:  Warm to touch; no obvious rashes   Neuro:  No focal deficits ; no obvious tremor    Psych:  Normal affect,  normal countenance;          Assessment:       ICD-10-CM ICD-9-CM    1. Obstructive sleep apnea (adult) (pediatric) G47.33 327.23 SLEEP STUDY UNATTENDED, 4 CHANNEL   2. Obesity (BMI 30.0-34. 9) E66.9 278.00          Plan:     * The patient currently has a Moderate Risk for having sleep apnea. STOP-BANG score 3.  * PSG was ordered for initial evaluation. I have reviewed the different types of sleep studies. Attended sleep studies and home sleep apnea tests. Home sleep testing tests only for the presence and severity of sleep apnea. she understands that if the HSAT does not provide reliable result(such as poor data/failed HSAT recording), she may have to repeat the HSAT or come in for an attended polysomnogram.     * She was provided information on sleep apnea including coresponding risk factors and the importance of proper treatment. * Counseling was provided regarding proper sleep hygiene and safe driving. Treatment options for sleep apnea were reviewed. she is not against a trial of PAP if found to have significant sleep apnea.      The treatment plan was reviewed with the patient in detail and reviewed with the patient and the lead technologist. she understands that the lead technologist will be calling/or contacting her  Through [address] (depending on her preference discussed with technologist) with the results and assisting with the next step in the treatment plan as outlined today during the consultation with me. All of her questions were addressed. she prefers to be contacted through [address]  2. Obesity - I have counseled the patient regarding the benefits of weight loss. Thank you for allowing us to participate in your patient's medical care. We'll keep you updated on these investigations.   Electronically signed by    Sacha Mathias MD  Diplomate in Sleep Medicine  [facility]

## 2018-12-11 ENCOUNTER — TELEPHONE (OUTPATIENT)
Dept: SLEEP MEDICINE | Age: 63
End: 2018-12-11

## 2018-12-11 DIAGNOSIS — G47.33 OBSTRUCTIVE SLEEP APNEA (ADULT) (PEDIATRIC): Primary | ICD-10-CM

## 2018-12-11 NOTE — TELEPHONE ENCOUNTER
Results of sleep study in [facility]  Lead tech to convey results to patient  HSAT results in [facility]. Test positive for significant sleep apnea. AHI 14/hour and lowest oxygen saturation was 80%. We had discussed treatment options at initial consultation. Based on the results of the home sleep apnea test, I believe a trial of APAP would be an effective mode of therapy. APAP order attached. she should be seen in the sleep disorder center 4-6 weeks after initiating PAP therapy. The APAP will have modem access so she can call the sleep center if she  has questions/concerns regarding the initial PAP settings. Front staff to Order PAP and call patient and let them know which DME company they should be hearing from after results reviewed with lead support technologist.   Schedule for first adherence visit in 6 weeks.

## 2018-12-12 ENCOUNTER — DOCUMENTATION ONLY (OUTPATIENT)
Dept: SLEEP MEDICINE | Age: 63
End: 2018-12-12

## 2018-12-12 NOTE — TELEPHONE ENCOUNTER
Reviewed sleep study results with patient. She expressed understanding and is willing to proceed with a trial of APAP. I also sent her a message with results in [facility] per her request. She would also like a copy of the results mailed to her home address. Fax DME order & Schedule 1st adherence visit in 60 to 90 days.

## 2018-12-17 ENCOUNTER — TELEPHONE (OUTPATIENT)
Dept: SLEEP MEDICINE | Age: 63
End: 2018-12-17

## 2018-12-17 DIAGNOSIS — G47.33 OBSTRUCTIVE SLEEP APNEA (ADULT) (PEDIATRIC): Primary | ICD-10-CM

## 2018-12-17 NOTE — TELEPHONE ENCOUNTER
Patient declined set up with [facility] and was given a machine from her brother but she is unsure if it is a APAP, she knows it is a CPAP. Can someone help triage this to see if what she has will work for her before we have her come in to find that its not going to work for her? If she needs to come in to figure that out then she understands. She will gladly read anything on machine to assist when we call. [phone]. Let me know how I can help.

## 2018-12-19 ENCOUNTER — OFFICE VISIT (OUTPATIENT)
Dept: SLEEP MEDICINE | Age: 63
End: 2018-12-19

## 2018-12-19 DIAGNOSIS — G47.33 OBSTRUCTIVE SLEEP APNEA (ADULT) (PEDIATRIC): Primary | ICD-10-CM

## 2018-12-19 NOTE — TELEPHONE ENCOUNTER
Patient was serviced today by our technologist but just needs CPAP Supplies. She needs an order for this processed and faxed to [address] - [facility]. I used the order for the PAP setup and supplies and marked through the order for the machine but they need a new order for just supplies only.

## 2018-12-31 RX ORDER — SERTRALINE HYDROCHLORIDE 100 MG/1
TABLET, FILM COATED ORAL
Qty: 135 TAB | Refills: 0 | Status: SHIPPED | OUTPATIENT
Start: 2018-12-31 | End: 2019-04-01 | Stop reason: SDUPTHER

## 2019-01-11 ENCOUNTER — DOCUMENTATION ONLY (OUTPATIENT)
Dept: SLEEP MEDICINE | Age: 64
End: 2019-01-11

## 2019-01-14 ENCOUNTER — TELEPHONE (OUTPATIENT)
Dept: SLEEP MEDICINE | Age: 64
End: 2019-01-14

## 2019-01-14 NOTE — TELEPHONE ENCOUNTER
Spoke with the patient about her CPAP. The patient did turn up her humidity and she feel that this has helped her headaches. I instructed the patient to return my phone call or send a message through My Chart if she has any further problems.

## 2019-02-20 ENCOUNTER — OFFICE VISIT (OUTPATIENT)
Dept: SLEEP MEDICINE | Age: 64
End: 2019-02-20

## 2019-02-20 ENCOUNTER — TELEPHONE (OUTPATIENT)
Dept: SLEEP MEDICINE | Age: 64
End: 2019-02-20

## 2019-02-20 VITALS
HEIGHT: 65 IN | OXYGEN SATURATION: 98 % | HEART RATE: 82 BPM | BODY MASS INDEX: 31.82 KG/M2 | DIASTOLIC BLOOD PRESSURE: 78 MMHG | SYSTOLIC BLOOD PRESSURE: 123 MMHG | WEIGHT: 191 LBS

## 2019-02-20 DIAGNOSIS — E66.9 OBESITY (BMI 30.0-34.9): ICD-10-CM

## 2019-02-20 DIAGNOSIS — G47.33 OBSTRUCTIVE SLEEP APNEA (ADULT) (PEDIATRIC): Primary | ICD-10-CM

## 2019-02-20 NOTE — PATIENT INSTRUCTIONS
217 [facility]., [place]. [place], [address]  Tel.  [phone]  Fax. 100 [facility] 60  [place], [address]  Tel.  [phone]  Fax. [phone] [address] Nar[address]amanca  Tel.  [phone]  Fax. [phone]     PROPER SLEEP HYGIENE    What to avoid  · Do not have drinks with caffeine, such as coffee or black tea, for 8 hours before bed. · Do not smoke or use other types of tobacco near bedtime. Nicotine is a stimulant and can keep you awake. · Avoid drinking alcohol late in the evening, because it can cause you to wake in the middle of the night. · Do not eat a big meal close to bedtime. If you are hungry, eat a light snack. · Do not drink a lot of water close to bedtime, because the need to urinate may wake you up during the night. · Do not read or watch TV in bed. Use the bed only for sleeping and sexual activity. What to try  · Go to bed at the same time every night, and wake up at the same time every morning. Do not take naps during the day. · Keep your bedroom quiet, dark, and cool. · Get regular exercise, but not within 3 to 4 hours of your bedtime. .  · Sleep on a comfortable pillow and mattress. · If watching the clock makes you anxious, turn it facing away from you so you cannot see the time. · If you worry when you lie down, start a worry book. Well before bedtime, write down your worries, and then set the book and your concerns aside. · Try meditation or other relaxation techniques before you go to bed. · If you cannot fall asleep, get up and go to another room until you feel sleepy. Do something relaxing. Repeat your bedtime routine before you go to bed again. · Make your house quiet and calm about an hour before bedtime. Turn down the lights, turn off the TV, log off the computer, and turn down the volume on music. This can help you relax after a busy day.     Drowsy Driving  The [address] [place] [address] Road Traffic Safety Administration cites drowsiness as a causing factor in more than 919,977 police reported crashes annually, resulting in 76,000 injuries and 1,500 deaths. Other surveys suggest 55% of people polled have driven while drowsy in the past year, 23% had fallen asleep but not crashed, 3% crashed, and 2% had and accident due to drowsy driving. Who is at risk? Young Drivers: One study of drowsy driving accidents states that 55% of the drivers were under 25 years. Of those, 75% were male. Shift Workers and Travelers: People who work overnight or travel across time zones frequently are at higher risk of experiencing Circadian Rhythm Disorders. They are trying to work and function when their body is programed to sleep. Sleep Deprived: Lack of sleep has a serious impact on your ability to pay attention or focus on a task. Consistently getting less than the average of 8 hours your body needs creates partial or cumulative sleep deprivation. Untreated Sleep Disorders: Sleep Apnea, Narcolepsy, R.L.S., and other sleep disorders (untreated) prevent a person from getting enough restful sleep. This leads to excessive daytime sleepiness and increases the risk for drowsy driving accidents by up to 7 times. Medications / Alcohol: Even over the counter medications can cause drowsiness. Medications that impair a drivers attention should have a warning label. Alcohol naturally makes you sleepy and on its own can cause accidents. Combined with excessive drowsiness its effects are amplified. Signs of Drowsy Driving:   * You don't remember driving the last few miles   * You may drift out of your leslie   * You are unable to focus and your thoughts wander   * You may yawn more often than normal   * You have difficulty keeping your eyes open / nodding off   * Missing traffic signs, speeding, or tailgating  Prevention-   Good sleep hygiene, lifestyle and behavioral choices have the most impact on drowsy driving.  There is no substitute for sleep and the average person requires 8 hours nightly. If you find yourself driving drowsy, stop and sleep. Consider the sleep hygiene tips provided during your visit as well. Medication Refill Policy: Refills for all medications require 1 week advance notice. Please have your pharmacy fax a refill request. We are unable to fax, or call in \"controled substance\" medications and you will need to pick these prescriptions up from our office. [employer]    Thank you for requesting access to [facility]. Please follow the instructions below to securely access and download your online medical record. [facility] allows you to send messages to your doctor, view your test results, renew your prescriptions, schedule appointments, and more. How Do I Sign Up? 1. In your internet browser, go to [email or web address]. 2. Click on the First Time User? Click Here link in the Sign In box. You will see the New Member Sign Up page. 3. Enter your [facility] Access Code exactly as it appears below. You will not need to use this code after youve completed the sign-up process. If you do not sign up before the expiration date, you must request a new code. [facility] Access Code: Activation code not generated  Current [facility] Status: Active (This is the date your [facility] access code will )    4. Enter the last four digits of your Social Security Number (xxxx) and Date of Birth (mm/dd/yyyy) as indicated and click Submit. You will be taken to the next sign-up page. 5. Create a [facility] ID. This will be your [facility] login ID and cannot be changed, so think of one that is secure and easy to remember. 6. Create a [facility] password. You can change your password at any time. 7. Enter your Password Reset Question and Answer. This can be used at a later time if you forget your password. 8. Enter your e-mail address. You will receive e-mail notification when new information is available in [address]   9. Click Sign Up. You can now view and download portions of your medical record. 10. Click the Download Summary menu link to download a portable copy of your medical information. Additional Information    If you have questions, please call [phone]. Remember, [employer] is NOT to be used for urgent needs. For medical emergencies, dial 911.

## 2019-02-20 NOTE — PROGRESS NOTES
[address] [place], [address]  Tel.  [phone]  Fax. 100 [facility] 60  [place], [address]  Tel.  [phone]  Fax. [phone] [address] [facility] [address] Narinder Mckeon   Tel.  [phone]  Fax. [phone]     S>Glendy Ojeda is a 58 y.o. female seen for a positive airway pressure follow-up. She reports minimal problems using the device. The following problems are identified:    Drowsiness no Problems exhaling no   Snoring No but feels she needs more air at beginning Forget to put on no   Mask Comfortable yes Can't fall asleep no   Dry Mouth no Mask falls off Yes, she doesn't know it til the morning   Air Leaking no Frequent awakenings no       Download reviewed. She admits that her sleep has improved. Therapy Apnea Index averaged over PAP use: 3 /hr which reflects improved sleep breathing condition. Allergies   Allergen Reactions    Keflex [Cephalexin] Nausea Only       She has a current medication list which includes the following prescription(s): sertraline, bupropion xl, naproxen sodium, rosuvastatin, sertraline, aspirin delayed-release, ibuprofen, fexofenadine hcl, cinnamon bark, and cholecalciferol. .      She  has a past medical history of Anxiety disorder, Carpal tunnel syndrome of left wrist, DDD (degenerative disc disease), thoracic (10/13/14), Depression, Hypercholesterolemia, Neurofibroma determined by biopsy of tongue ([facility] [address] Prediabetes (06/2016), and Scoliosis (10/13/14). [place] Sleepiness Score: 15   and Modified F.O.S.Q. Score Total / 2: 12   which reflect improved sleep quality over therapy time.     O>    Visit Vitals  /78 (BP 1 Location: Left arm, BP Patient Position: Sitting)   Pulse 82   Ht 5' 5\" (1.651 m)   Wt 191 lb (86.6 kg)   SpO2 98%   BMI 31.78 kg/m²           General:   Alert, oriented, not in distress   Neck:   No JVD    Chest/Lungs:  symetrical lung expansion , no accessory muscle use    Extremities: no obvious rashes , negative edema    Neuro:  No focal deficits ; No obvious tremor    Psych:  Normal affect ,  Normal countenance ;           A>    ICD-10-CM ICD-9-CM    1. Obstructive sleep apnea (adult) (pediatric) G47.33 327.23    2. Obesity (BMI 30.0-34. 9) E66.9 278.00      AHI = 14(2018). On CPAP :  5-10 cmH2O. Compliant:      yes    Therapeutic Response:  Positive    P>      * Follow-up Disposition:  Return in about 1 year (around 2/20/2020). Increase setting to 6-12 cmH20    * She was asked to contact our office for any problems regarding PAP therapy. * Counseling was provided regarding the importance of regular PAP use and on proper sleep hygiene and safe driving. * Re-enforced proper and regular cleaning for the device. I have counseled the patient regarding the benefits of smoking cessation. 2. Obesity - I have counseled the patient regarding the benefits of weight loss.     Electronically signed by    Shasta Santos MD  Diplomate in Sleep Medicine  [facility]

## 2019-02-22 ENCOUNTER — OFFICE VISIT (OUTPATIENT)
Dept: INTERNAL MEDICINE CLINIC | Age: 64
End: 2019-02-22

## 2019-02-22 VITALS
BODY MASS INDEX: 31.69 KG/M2 | HEART RATE: 90 BPM | TEMPERATURE: 98.3 F | OXYGEN SATURATION: 97 % | SYSTOLIC BLOOD PRESSURE: 140 MMHG | DIASTOLIC BLOOD PRESSURE: 98 MMHG | HEIGHT: 65 IN | WEIGHT: 190.2 LBS | RESPIRATION RATE: 16 BRPM

## 2019-02-22 DIAGNOSIS — K62.89 RECTAL PAIN: Primary | ICD-10-CM

## 2019-02-22 DIAGNOSIS — R03.0 ELEVATED BP WITHOUT DIAGNOSIS OF HYPERTENSION: ICD-10-CM

## 2019-02-22 NOTE — PROGRESS NOTES
Chief Complaint   Patient presents with    Other     cyst     Patient states she is here for what she thinks is a cyst on her anus. Patient states it's been there for about one week.

## 2019-02-22 NOTE — PATIENT INSTRUCTIONS
Referral to Gastroenterology if symptoms persist or worsen  Continue warm compresses  Call or return to clinic if these symptoms worsen or fail to improve as anticipated.

## 2019-02-22 NOTE — PROGRESS NOTES
HISTORY OF PRESENT ILLNESS  Sean Newsome is a 58 y.o. female. HPI   Patient complains of soreness in the perirectal area x 1 week. Overall it has improved over the last several days. She initially thought she felt a \"pimple\" in the area which was tender to touch. She has been applying warm compresses, Bacitracin and Hydrocortisone. She denies fevers, drainage or pain with bowel movements She denies constipation, melena, or rectal bleeding. She is not due for a colonoscopy until   Past Medical History:   Diagnosis Date    Anxiety disorder     Carpal tunnel syndrome of left wrist     DDD (degenerative disc disease), thoracic 10/13/14    Depression     Hypercholesterolemia     Neurofibroma determined by biopsy of tongue ([facility] [address]     Dr. Shavon Horton Prediabetes 2016    A1C 6.1    Scoliosis 10/13/14    thoracic spine, mild      Past Surgical History:   Procedure Laterality Date    HX  SECTION  1984    HX ORTHOPAEDIC Left     CARPAL ALIYAH    HX SHANNA AND BSO      Fibroids      Current Outpatient Medications on File Prior to Visit   Medication Sig Dispense Refill    sertraline (ZOLOFT) 100 mg tablet TAKE 1 & 1/2 TABLETS DAILY 135 Tab 0    buPROPion XL (WELLBUTRIN XL) 300 mg XL tablet TAKE ONE TABLET DAILY. 30 Tab 5    naproxen sodium (ALEVE PO) Take  by mouth as needed.  rosuvastatin (CRESTOR) 10 mg tablet Take 1 Tab by mouth nightly. 90 Tab 3    sertraline (ZOLOFT) 100 mg tablet Take 1.5 tabs by mouth daily (Patient taking differently: Take 150 mg by mouth daily. Take 1.5 tabs by mouth daily) 135 Tab 2    aspirin delayed-release 81 mg tablet Take  by mouth daily.  ibuprofen (MOTRIN) 200 mg tablet Take 400 mg by mouth every six (6) hours as needed for Pain.  FEXOFENADINE HCL (ALLEGRA PO) Take 1 Tab by mouth daily as needed.  cinnamon bark (CINNAMON) 500 mg cap Take 2 Tabs by mouth daily.       cholecalciferol (VITAMIN D3) 1,000 unit tablet Take 2,000 Units by mouth two (2) times a day. No current facility-administered medications on file prior to visit. All: reviewed  ROS  Per HPI  Physical Exam   Visit Vitals  BP (!) 140/98 (BP 1 Location: Left arm, BP Patient Position: Sitting)   Pulse 90   Temp 98.3 °F (36.8 °C) (Oral)   Resp 16   Ht 5' 5\" (1.651 m)   Wt 190 lb 3.2 oz (86.3 kg)   SpO2 97%   BMI 31.65 kg/m²    Patient appears well  Heart[de-identified] normal rate, regular rhythm, normal S1, S2, no murmurs, rubs, clicks or gallops. Chest: clear to auscultation, no wheezes, rales or rhonchi,   Perianal area: without erythema, fluctuance, cyst. 1 small, non inflamed external hemorrhoid  Digital rectal exam: no mass or tenderness  Extremities: no edema    ASSESSMENT and PLAN  Perianal discomfort : exam is benign today. I have advised the patient to continue her warm compresses if needed  And follow up with Gastroenterology if symptoms persist  Call or return to clinic if these symptoms worsen or fail to improve as anticipated. Elevated blood pressure: blood pressure is high today, but patient is likely anxious.  Recent readings have been normal

## 2019-03-04 ENCOUNTER — TELEPHONE (OUTPATIENT)
Dept: SLEEP MEDICINE | Age: 64
End: 2019-03-04

## 2019-03-04 NOTE — TELEPHONE ENCOUNTER
Spoke with the patient regarding her CPAP equipment. Advised that she needs to contact the home care company to have the equipment checked out. Patient to call back if she requires any further assistance or questions emerge.

## 2019-05-07 LAB
25(OH)D3+25(OH)D2 SERPL-MCNC: 51.8 NG/ML (ref 30–100)
ALBUMIN SERPL-MCNC: 4.2 G/DL (ref 3.6–4.8)
ALBUMIN/GLOB SERPL: 2 {RATIO} (ref 1.2–2.2)
ALP SERPL-CCNC: 75 IU/L (ref 39–117)
ALT SERPL-CCNC: 14 IU/L (ref 0–32)
AST SERPL-CCNC: 15 IU/L (ref 0–40)
BILIRUB SERPL-MCNC: 0.3 MG/DL (ref 0–1.2)
BUN SERPL-MCNC: 18 MG/DL (ref 8–27)
BUN/CREAT SERPL: 14 (ref 12–28)
CALCIUM SERPL-MCNC: 9.3 MG/DL (ref 8.7–10.3)
CHLORIDE SERPL-SCNC: 108 MMOL/L (ref 96–106)
CHOLEST SERPL-MCNC: 128 MG/DL (ref 100–199)
CO2 SERPL-SCNC: 25 MMOL/L (ref 20–29)
CREAT SERPL-MCNC: 1.25 MG/DL (ref 0.57–1)
EST. AVERAGE GLUCOSE BLD GHB EST-MCNC: 126 MG/DL
GLOBULIN SER CALC-MCNC: 2.1 G/DL (ref 1.5–4.5)
GLUCOSE SERPL-MCNC: 107 MG/DL (ref 65–99)
HBA1C MFR BLD: 6 % (ref 4.8–5.6)
HDLC SERPL-MCNC: 56 MG/DL
LDLC SERPL CALC-MCNC: 58 MG/DL (ref 0–99)
POTASSIUM SERPL-SCNC: 4.6 MMOL/L (ref 3.5–5.2)
PROT SERPL-MCNC: 6.3 G/DL (ref 6–8.5)
SODIUM SERPL-SCNC: 145 MMOL/L (ref 134–144)
TRIGL SERPL-MCNC: 69 MG/DL (ref 0–149)
VLDLC SERPL CALC-MCNC: 14 MG/DL (ref 5–40)

## 2019-05-15 ENCOUNTER — OFFICE VISIT (OUTPATIENT)
Dept: INTERNAL MEDICINE CLINIC | Age: 64
End: 2019-05-15

## 2019-05-15 VITALS
OXYGEN SATURATION: 97 % | DIASTOLIC BLOOD PRESSURE: 90 MMHG | HEART RATE: 95 BPM | RESPIRATION RATE: 18 BRPM | HEIGHT: 65 IN | TEMPERATURE: 98.5 F | SYSTOLIC BLOOD PRESSURE: 124 MMHG | BODY MASS INDEX: 28.82 KG/M2 | WEIGHT: 173 LBS

## 2019-05-15 DIAGNOSIS — E78.00 HYPERCHOLESTEREMIA: ICD-10-CM

## 2019-05-15 DIAGNOSIS — Z00.00 WELL WOMAN EXAM (NO GYNECOLOGICAL EXAM): Primary | ICD-10-CM

## 2019-05-15 DIAGNOSIS — E78.2 MIXED HYPERLIPIDEMIA: ICD-10-CM

## 2019-05-15 DIAGNOSIS — Z23 ENCOUNTER FOR IMMUNIZATION: ICD-10-CM

## 2019-05-15 DIAGNOSIS — F41.1 GAD (GENERALIZED ANXIETY DISORDER): ICD-10-CM

## 2019-05-15 DIAGNOSIS — R73.09 ELEVATED HEMOGLOBIN A1C: ICD-10-CM

## 2019-05-15 DIAGNOSIS — R94.4 DECREASED GFR: ICD-10-CM

## 2019-05-15 NOTE — PROGRESS NOTES
HISTORY OF PRESENT ILLNESS  Shanae Matthews is a 58 y.o. female for CPE   HPI  Health Maintenance   Topic Date Due    Shingrix Vaccine Age 49> (1 of 2) 11/26/2004    PAP AKA CERVICAL CYTOLOGY  10/15/2018    Influenza Age 9 to Adult  08/01/2019    Bone Densitometry (Dexa) Screening  11/26/2019    BREAST CANCER SCRN MAMMOGRAM  06/19/2020    DTaP/Tdap/Td series (2 - Td) 01/26/2025    COLONOSCOPY  06/05/2025    Hepatitis C Screening  Completed    Pneumococcal 0-64 years  Completed       Cardiovascular Review:  The patient has hyperlipidemia and Body mass index is 28.79 kg/m². has lost ~20lbs (17lbs) with decreased sugar intake. Diet and Lifestyle: exercises regularly, smoker- 4 to 5 cigarettes daily, alcohol intake <7drinks/ week, reduced carbohydrate diet  Home BP Monitoring: is not measured at home. Pertinent ROS: no TIA's, no chest pain on exertion, no dyspnea on exertion, no swelling of ankles, taking medications as instructed\",no medication side effects noted. 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 ([facility] [address] 06/14/2016    Prediabetes 06/01/2016    Elevated LDL cholesterol level 07/28/2014    Hypercholesteremia 06/19/2014    ENIO (generalized anxiety disorder) 04/14/2014    Hypovitaminosis D 04/14/2014    Tobacco dependence 12/02/2013    Depression with anxiety 12/02/2013    H/O colonoscopy 12/02/2013    Osteopenia 12/02/2013    Post-nasal drip 12/02/2013       Current Outpatient Medications   Medication Sig Dispense Refill    sertraline (ZOLOFT) 100 mg tablet TAKE 1 & 1/2 TABLETS DAILY 135 Tab 2    buPROPion XL (WELLBUTRIN XL) 300 mg XL tablet TAKE ONE TABLET DAILY. 30 Tab 5    rosuvastatin (CRESTOR) 10 mg tablet Take 1 Tab by mouth nightly. 90 Tab 3    FEXOFENADINE HCL (ALLEGRA PO) Take 1 Tab by mouth daily as needed.       cinnamon bark (CINNAMON) 500 mg cap Take 2 Tabs by mouth daily.  cholecalciferol (VITAMIN D3) 1,000 unit tablet Take 2,000 Units by mouth two (2) times a day.  naproxen sodium (ALEVE PO) Take  by mouth as needed.  aspirin delayed-release 81 mg tablet Take  by mouth daily.  ibuprofen (MOTRIN) 200 mg tablet Take 400 mg by mouth every six (6) hours as needed for Pain. Allergies   Allergen Reactions    Keflex [Cephalexin] Nausea Only      Visit Vitals  /90 (BP 1 Location: Right arm)   Pulse 95   Temp 98.5 °F (36.9 °C) (Oral)   Resp 18   Ht 5' 5\" (1.651 m)   Wt 173 lb (78.5 kg)   SpO2 97%   BMI 28.79 kg/m²       Physical Exam   Constitutional: She is oriented to person, place, and time. She appears well-developed and well-nourished. HENT:   Right Ear: External ear normal.   Left Ear: External ear normal.   Mouth/Throat: Oropharynx is clear and moist. No oropharyngeal exudate. Eyes: Conjunctivae are normal. No scleral icterus. Neck: Normal range of motion. Neck supple. No thyromegaly present. Cardiovascular: Normal rate, regular rhythm and normal heart sounds. Pulmonary/Chest: Effort normal and breath sounds normal. No respiratory distress. She has no wheezes. She has no rales. She exhibits no tenderness. Abdominal: Soft. Bowel sounds are normal. She exhibits no distension. There is no tenderness. There is no rebound and no guarding. Genitourinary:   Genitourinary Comments: Pap not indicated at this time     Musculoskeletal: Normal range of motion. She exhibits no edema or tenderness. Neurological: She is alert and oriented to person, place, and time.    Skin:           Lab Results   Component Value Date/Time    WBC 5.0 02/26/2018 08:30 AM    HGB 12.9 02/26/2018 08:30 AM    HCT 39.7 02/26/2018 08:30 AM    PLATELET 575 82/23/4977 08:30 AM    MCV 87 02/26/2018 08:30 AM     Lab Results   Component Value Date/Time    Hemoglobin A1c 6.0 (H) 05/06/2019 08:32 AM    Hemoglobin A1c 5.8 (H) 02/26/2018 08:30 AM    Hemoglobin A1c 6.3 (H) 04/20/2017 09:15 AM    Glucose 107 (H) 05/06/2019 08:32 AM    LDL, calculated 58 05/06/2019 08:32 AM    Creatinine 1.25 (H) 05/06/2019 08:32 AM      Lab Results   Component Value Date/Time    Cholesterol, total 128 05/06/2019 08:32 AM    HDL Cholesterol 56 05/06/2019 08:32 AM    LDL, calculated 58 05/06/2019 08:32 AM    Triglyceride 69 05/06/2019 08:32 AM     Lab Results   Component Value Date/Time    ALT (SGPT) 14 05/06/2019 08:32 AM    AST (SGOT) 15 05/06/2019 08:32 AM    Alk. phosphatase 75 05/06/2019 08:32 AM    Bilirubin, total 0.3 05/06/2019 08:32 AM    Albumin 4.2 05/06/2019 08:32 AM    Protein, total 6.3 05/06/2019 08:32 AM    PLATELET 804 88/38/8919 08:30 AM     Lab Results   Component Value Date/Time    GFR est non-AA 46 (L) 05/06/2019 08:32 AM    GFR est AA 53 (L) 05/06/2019 08:32 AM    Creatinine 1.25 (H) 05/06/2019 08:32 AM    BUN 18 05/06/2019 08:32 AM    Sodium 145 (H) 05/06/2019 08:32 AM    Potassium 4.6 05/06/2019 08:32 AM    Chloride 108 (H) 05/06/2019 08:32 AM    CO2 25 05/06/2019 08:32 AM     Lab Results   Component Value Date/Time    TSH 4.430 02/26/2018 08:30 AM    T3 Uptake 24 02/26/2018 08:30 AM    T4, Total 5.2 02/26/2018 08:30 AM      Lab Results   Component Value Date/Time    Glucose 107 (H) 05/06/2019 08:32 AM           ASSESSMENT and PLAN  Diagnoses and all orders for this visit:    1.  Well woman exam (no gynecological exam)- Health Maintenance reviewed and addressed as ordered below   Health Maintenance   Topic Date Due    Shingrix Vaccine Age 49> (1 of 2) 03/27/2020 (Originally 11/26/2004)    PAP AKA CERVICAL CYTOLOGY  05/03/2021 (Originally 10/15/2018)    Influenza Age 5 to Adult  08/01/2019    Bone Densitometry (Dexa) Screening  11/26/2019    BREAST CANCER SCRN MAMMOGRAM  06/19/2020    DTaP/Tdap/Td series (2 - Td) 01/26/2025    COLONOSCOPY  06/05/2025    Hepatitis C Screening  Completed    Pneumococcal 0-64 years Completed         2. Hypercholesteremia- LDL at goal. Liza Fernandez concerned about statin induced hyperglycemia due to increase in A1c from 5.8 to 6.0. She has only been on her low carb diet x 2 months thus this probably reflects her prior dietary indiscretion. She will continue her statin x 3 months and recheck a1c. Continue to work on smoking cessation. 3. ENIO (generalized anxiety disorder)- stable. Continue regimen. Patient Education:  Reviewed concept of anxiety as biochemical imbalance of neurotransmitters and rationale for treatment. Instructed patient to contact office or 911 promptly should condition worsen or any new symptoms appear and provided on-call telephone numbers. IF THE PATIENT HAS ANY SUICIDAL OR HOMICIDAL IDEATION, CALL THE OFFICE, DISCUSS WITH A SUPPORT MEMBER OR GO TO THE ER IMMEDIATELY. Patient was agreeable with this      4. Mixed hyperlipidemia- see above    5. Decreased GFR- likely prerenal. Optimize hydration   -     METABOLIC PANEL, BASIC    6. Encounter for immunization- rx given  -     varicella-zoster recombinant, PF, (SHINGRIX, PF,) 50 mcg/0.5 mL susr injection; 0.5 mL by IntraMUSCular route once for 1 dose. 7. Elevated hemoglobin A1c- working on sugar reduction. Medication risks/benefits/costs/interactions/alternatives discussed with patient. Liza Fernandez  was given an after visit summary which includes diagnoses, current medications, & vitals. she expressed understanding with the diagnosis and plan.

## 2019-05-16 LAB
BUN SERPL-MCNC: 19 MG/DL (ref 8–27)
BUN/CREAT SERPL: 20 (ref 12–28)
CALCIUM SERPL-MCNC: 9.3 MG/DL (ref 8.7–10.3)
CHLORIDE SERPL-SCNC: 104 MMOL/L (ref 96–106)
CO2 SERPL-SCNC: 24 MMOL/L (ref 20–29)
CREAT SERPL-MCNC: 0.93 MG/DL (ref 0.57–1)
GLUCOSE SERPL-MCNC: 101 MG/DL (ref 65–99)
POTASSIUM SERPL-SCNC: 4.8 MMOL/L (ref 3.5–5.2)
SODIUM SERPL-SCNC: 142 MMOL/L (ref 134–144)

## 2019-06-25 ENCOUNTER — HOSPITAL ENCOUNTER (OUTPATIENT)
Dept: MAMMOGRAPHY | Age: 64
Discharge: HOME | End: 2019-06-25
Attending: INTERNAL MEDICINE
Payer: COMMERCIAL

## 2019-06-25 DIAGNOSIS — Z12.39 BREAST SCREENING, UNSPECIFIED: ICD-10-CM

## 2019-06-25 PROCEDURE — 77063 BREAST TOMOSYNTHESIS BI: CPT

## 2019-09-24 DIAGNOSIS — R00.2 PALPITATIONS: ICD-10-CM

## 2019-09-24 DIAGNOSIS — R29.818 SUSPECTED SLEEP APNEA: ICD-10-CM

## 2019-09-24 DIAGNOSIS — R60.0 LEG EDEMA: ICD-10-CM

## 2019-09-24 DIAGNOSIS — E78.00 HYPERCHOLESTEREMIA: ICD-10-CM

## 2019-09-24 RX ORDER — ROSUVASTATIN CALCIUM 10 MG/1
10 TABLET, COATED ORAL
Qty: 30 TAB | Refills: 0 | Status: SHIPPED | OUTPATIENT
Start: 2019-09-24 | End: 2019-10-23 | Stop reason: SDUPTHER

## 2019-10-16 ENCOUNTER — OFFICE VISIT (OUTPATIENT)
Dept: INTERNAL MEDICINE CLINIC | Age: 64
End: 2019-10-16

## 2019-10-16 VITALS
RESPIRATION RATE: 16 BRPM | BODY MASS INDEX: 28.16 KG/M2 | WEIGHT: 169 LBS | HEART RATE: 72 BPM | SYSTOLIC BLOOD PRESSURE: 124 MMHG | HEIGHT: 65 IN | DIASTOLIC BLOOD PRESSURE: 68 MMHG | OXYGEN SATURATION: 97 % | TEMPERATURE: 98.6 F

## 2019-10-16 DIAGNOSIS — R35.0 URINARY FREQUENCY: ICD-10-CM

## 2019-10-16 DIAGNOSIS — R19.8 CHANGE IN BOWEL MOVEMENT: ICD-10-CM

## 2019-10-16 DIAGNOSIS — N30.00 ACUTE CYSTITIS WITHOUT HEMATURIA: Primary | ICD-10-CM

## 2019-10-16 LAB
BILIRUB UR QL STRIP: NEGATIVE
GLUCOSE UR-MCNC: NEGATIVE MG/DL
KETONES P FAST UR STRIP-MCNC: NEGATIVE MG/DL
PH UR STRIP: 5.5 [PH] (ref 4.6–8)
PROT UR QL STRIP: NEGATIVE
SP GR UR STRIP: 1.01 (ref 1–1.03)
UA UROBILINOGEN AMB POC: NORMAL (ref 0.2–1)
URINALYSIS CLARITY POC: CLEAR
URINALYSIS COLOR POC: YELLOW
URINE BLOOD POC: NEGATIVE
URINE LEUKOCYTES POC: NORMAL
URINE NITRITES POC: NEGATIVE

## 2019-10-16 NOTE — PROGRESS NOTES
Yfn Carrasco is a 58 y.o. female who was seen in clinic today (10/16/2019) for an acute visit. Assessment & Plan:     Diagnoses and all orders for this visit:    1. Acute cystitis without hematuria- new dx to me, recurrent per her, symptoms are minimal, will check UC, reviewed starting treatment now or waiting, will wait and f/u on UC.  -     CULTURE, URINE    2. Urinary frequency  -     AMB POC URINALYSIS DIP STICK AUTO W/O MICRO    3. Change in bowel movement- new dx, has resolved, unclear etiology, will monitor for now, no med changes, reviewed diet triggers         Follow-up and Dispositions    · Return if symptoms worsen or fail to improve. Subjective:   Juanpablo Maddox was seen today for Urinary Frequency and Abdominal Pain    Urinary Changes  Juanpablo Maddox returns to clinic today to discuss frequency, suprapubic pressure and a slightly abnormal sensation w/ urination for 6 days. This is a recurrent issue. She denies dysuria, urgency, nausea, vomiting, hematuria, incontinence, nocturia. She reports symptoms are worsened. Patient has tried: increasing PO intake and cranberry pills or juice. She reports the following likely etiologies: nothing. Same day this started she had several episodes of diarrhea, normally revery regular. No other symptoms. BM's have been more firm, small, and have had a black jeannette to them. No melena. No brbrpr. No blood in stool. No specific diet changes or new medications. Prior to Admission medications    Medication Sig Start Date End Date Taking? Authorizing Provider   rosuvastatin (CRESTOR) 10 mg tablet Take 1 Tab by mouth nightly. Patient needs appt for further re-fills 9/24/19  Yes Sobeida Duran MD   buPROPion XL (WELLBUTRIN XL) 300 mg XL tablet TAKE ONE TABLET DAILY.  6/1/19  Yes Aminata Juarez MD   sertraline (ZOLOFT) 100 mg tablet TAKE 1 & 1/2 TABLETS DAILY 4/2/19  Yes Aminata Juarez MD   naproxen sodium (ALEVE PO) Take  by mouth as needed. Yes Provider, Historical   ibuprofen (MOTRIN) 200 mg tablet Take 400 mg by mouth every six (6) hours as needed for Pain. Yes Provider, Historical   FEXOFENADINE HCL (ALLEGRA PO) Take 1 Tab by mouth daily as needed. Yes Provider, Historical   cinnamon bark (CINNAMON) 500 mg cap Take 2 Tabs by mouth daily. Yes Provider, Historical   cholecalciferol (VITAMIN D3) 1,000 unit tablet Take 2,000 Units by mouth two (2) times a day. Yes Provider, Historical   buPROPion XL (WELLBUTRIN XL) 300 mg XL tablet TAKE ONE TABLET DAILY. 11/19/18 10/16/19  Radha Connolly MD   aspirin delayed-release 81 mg tablet Take  by mouth daily. Provider, Historical          Allergies   Allergen Reactions    Keflex [Cephalexin] Nausea Only           ROS - per HPI        Objective:   Physical Exam   Cardiovascular: Regular rhythm and normal heart sounds. No murmur heard. Pulmonary/Chest: Effort normal and breath sounds normal. She has no wheezes. She has no rales. Abdominal: Bowel sounds are normal. She exhibits no mass. There is no hepatosplenomegaly. There is no tenderness. There is no rigidity, no rebound and no guarding. Visit Vitals  /68   Pulse 72   Temp 98.6 °F (37 °C) (Oral)   Resp 16   Ht 5' 5\" (1.651 m)   Wt 169 lb (76.7 kg)   SpO2 97%   BMI 28.12 kg/m²         Disclaimer:  Advised her to call back or return to office if symptoms worsen/change/persist.  Discussed expected course/resolution/complications of diagnosis in detail with patient. Medication risks/benefits/costs/interactions/alternatives discussed with patient. She was given an after visit summary which includes diagnoses, current medications, & vitals. She expressed understanding with the diagnosis and plan. Aspects of this note may have been generated using voice recognition software. Despite editing, there may be some syntax errors.        Gertrudis Pedraza MD

## 2019-10-16 NOTE — PROGRESS NOTES
Urinary frequency and burning. Has been having some abdominal discomfort and noticed some \"black flecks\" in her stool.

## 2019-10-17 NOTE — PATIENT INSTRUCTIONS
Urinary Tract Infection in Women: Care Instructions  Your Care Instructions    A urinary tract infection, or UTI, is a general term for an infection anywhere between the kidneys and the urethra (where urine comes out). Most UTIs are bladder infections. They often cause pain or burning when you urinate. UTIs are caused by bacteria and can be cured with antibiotics. Be sure to complete your treatment so that the infection goes away. Follow-up care is a key part of your treatment and safety. Be sure to make and go to all appointments, and call your doctor if you are having problems. It's also a good idea to know your test results and keep a list of the medicines you take. How can you care for yourself at home? · Take your antibiotics as directed. Do not stop taking them just because you feel better. You need to take the full course of antibiotics. · Drink extra water and other fluids for the next day or two. This may help wash out the bacteria that are causing the infection. (If you have kidney, heart, or liver disease and have to limit fluids, talk with your doctor before you increase your fluid intake.)  · Avoid drinks that are carbonated or have caffeine. They can irritate the bladder. · Urinate often. Try to empty your bladder each time. · To relieve pain, take a hot bath or lay a heating pad set on low over your lower belly or genital area. Never go to sleep with a heating pad in place. To prevent UTIs  · Drink plenty of water each day. This helps you urinate often, which clears bacteria from your system. (If you have kidney, heart, or liver disease and have to limit fluids, talk with your doctor before you increase your fluid intake.)  · Urinate when you need to. · Urinate right after you have sex. · Change sanitary pads often. · Avoid douches, bubble baths, feminine hygiene sprays, and other feminine hygiene products that have deodorants.   · After going to the bathroom, wipe from front to back.  When should you call for help? Call your doctor now or seek immediate medical care if:    · Symptoms such as fever, chills, nausea, or vomiting get worse or appear for the first time.     · You have new pain in your back just below your rib cage. This is called flank pain.     · There is new blood or pus in your urine.     · You have any problems with your antibiotic medicine.    Watch closely for changes in your health, and be sure to contact your doctor if:    · You are not getting better after taking an antibiotic for 2 days.     · Your symptoms go away but then come back. Where can you learn more? Go to http://zaid-ruddy.info/. Enter B216 in the search box to learn more about \"Urinary Tract Infection in Women: Care Instructions. \"  Current as of: December 19, 2018  Content Version: 12.2  © 5994-3160 [employer]. Care instructions adapted under license by [employer] (which disclaims liability or warranty for this information). If you have questions about a medical condition or this instruction, always ask your healthcare professional. [address] any warranty or liability for your use of this information.

## 2019-10-19 LAB — BACTERIA UR CULT: ABNORMAL

## 2019-10-20 RX ORDER — SULFAMETHOXAZOLE AND TRIMETHOPRIM 800; 160 MG/1; MG/1
1 TABLET ORAL 2 TIMES DAILY
Qty: 10 TAB | Refills: 0 | Status: SHIPPED | OUTPATIENT
Start: 2019-10-20 | End: 2019-10-20

## 2019-10-23 DIAGNOSIS — R00.2 PALPITATIONS: ICD-10-CM

## 2019-10-23 DIAGNOSIS — R60.0 LEG EDEMA: ICD-10-CM

## 2019-10-23 DIAGNOSIS — E78.00 HYPERCHOLESTEREMIA: ICD-10-CM

## 2019-10-23 DIAGNOSIS — R29.818 SUSPECTED SLEEP APNEA: ICD-10-CM

## 2019-10-23 RX ORDER — ROSUVASTATIN CALCIUM 10 MG/1
10 TABLET, COATED ORAL
Qty: 30 TAB | Refills: 0 | Status: SHIPPED | OUTPATIENT
Start: 2019-10-23 | End: 2019-12-19 | Stop reason: SDUPTHER

## 2019-10-23 NOTE — TELEPHONE ENCOUNTER
Requested Prescriptions     Signed Prescriptions Disp Refills    rosuvastatin (CRESTOR) 10 mg tablet 30 Tab 0     Sig: Take 1 Tab by mouth nightly. Please request future refills from PCP     Authorizing Provider: Lord Perry     Ordering User: Tio Jesus    Per Dr. Glenny Fox verbal orders     Mychart message sent to patient explaining she should request future refills from PCP. Last O.V. 9/26/18. Patient to f/u with PCP and f/u with Dr. Bandar floyd.

## 2019-12-09 DIAGNOSIS — R73.03 PREDIABETES: Primary | ICD-10-CM

## 2019-12-19 DIAGNOSIS — R60.0 LEG EDEMA: ICD-10-CM

## 2019-12-19 DIAGNOSIS — R00.2 PALPITATIONS: ICD-10-CM

## 2019-12-19 DIAGNOSIS — R29.818 SUSPECTED SLEEP APNEA: ICD-10-CM

## 2019-12-19 DIAGNOSIS — E78.00 HYPERCHOLESTEREMIA: ICD-10-CM

## 2019-12-19 NOTE — TELEPHONE ENCOUNTER
Requested Prescriptions     Pending Prescriptions Disp Refills    rosuvastatin (CRESTOR) 10 mg tablet 30 Tab 0     Sig: Take 1 Tab by mouth nightly.  Please request future refills from PCP       [id number] [facility], [address] [facility]  [phone]

## 2019-12-20 RX ORDER — ROSUVASTATIN CALCIUM 10 MG/1
10 TABLET, COATED ORAL
Qty: 90 TAB | Refills: 0 | Status: SHIPPED | OUTPATIENT
Start: 2019-12-20 | End: 2020-03-20 | Stop reason: SDUPTHER

## 2019-12-24 LAB
ALBUMIN SERPL-MCNC: 4.3 G/DL (ref 3.6–4.8)
ALBUMIN/GLOB SERPL: 2.3 {RATIO} (ref 1.2–2.2)
ALP SERPL-CCNC: 97 IU/L (ref 39–117)
ALT SERPL-CCNC: 19 IU/L (ref 0–32)
AST SERPL-CCNC: 16 IU/L (ref 0–40)
BILIRUB SERPL-MCNC: 0.3 MG/DL (ref 0–1.2)
BUN SERPL-MCNC: 21 MG/DL (ref 8–27)
BUN/CREAT SERPL: 18 (ref 12–28)
CALCIUM SERPL-MCNC: 9.2 MG/DL (ref 8.7–10.3)
CHLORIDE SERPL-SCNC: 102 MMOL/L (ref 96–106)
CO2 SERPL-SCNC: 24 MMOL/L (ref 20–29)
CREAT SERPL-MCNC: 1.15 MG/DL (ref 0.57–1)
ERYTHROCYTE [DISTWIDTH] IN BLOOD BY AUTOMATED COUNT: 13.5 % (ref 12.3–15.4)
EST. AVERAGE GLUCOSE BLD GHB EST-MCNC: 123 MG/DL
GLOBULIN SER CALC-MCNC: 1.9 G/DL (ref 1.5–4.5)
GLUCOSE SERPL-MCNC: 93 MG/DL (ref 65–99)
HBA1C MFR BLD: 5.9 % (ref 4.8–5.6)
HCT VFR BLD AUTO: 37.8 % (ref 34–46.6)
HGB BLD-MCNC: 12.9 G/DL (ref 11.1–15.9)
MCH RBC QN AUTO: 29.4 PG (ref 26.6–33)
MCHC RBC AUTO-ENTMCNC: 34.1 G/DL (ref 31.5–35.7)
MCV RBC AUTO: 86 FL (ref 79–97)
PLATELET # BLD AUTO: 179 X10E3/UL (ref 150–450)
POTASSIUM SERPL-SCNC: 4.5 MMOL/L (ref 3.5–5.2)
PROT SERPL-MCNC: 6.2 G/DL (ref 6–8.5)
RBC # BLD AUTO: 4.39 X10E6/UL (ref 3.77–5.28)
SODIUM SERPL-SCNC: 143 MMOL/L (ref 134–144)
WBC # BLD AUTO: 6.7 X10E3/UL (ref 3.4–10.8)

## 2019-12-24 NOTE — PROGRESS NOTES
Results released to patient via [employer]. All labs are stable or at goal for her, except for slight bump in Cr (tends to do that w/ fasting per review of previous labs). FBS normal, A1c stable.   She has f/u on 12/31

## 2019-12-31 ENCOUNTER — OFFICE VISIT (OUTPATIENT)
Dept: INTERNAL MEDICINE CLINIC | Age: 64
End: 2019-12-31

## 2019-12-31 VITALS
BODY MASS INDEX: 29.16 KG/M2 | HEIGHT: 65 IN | DIASTOLIC BLOOD PRESSURE: 72 MMHG | WEIGHT: 175 LBS | OXYGEN SATURATION: 97 % | TEMPERATURE: 98.6 F | RESPIRATION RATE: 16 BRPM | HEART RATE: 82 BPM | SYSTOLIC BLOOD PRESSURE: 124 MMHG

## 2019-12-31 DIAGNOSIS — E78.00 HYPERCHOLESTEREMIA: ICD-10-CM

## 2019-12-31 DIAGNOSIS — F17.200 TOBACCO DEPENDENCE: ICD-10-CM

## 2019-12-31 DIAGNOSIS — R73.03 PREDIABETES: ICD-10-CM

## 2019-12-31 DIAGNOSIS — F41.1 GAD (GENERALIZED ANXIETY DISORDER): ICD-10-CM

## 2019-12-31 DIAGNOSIS — D36.10: ICD-10-CM

## 2019-12-31 DIAGNOSIS — E66.3 OVERWEIGHT (BMI 25.0-29.9): ICD-10-CM

## 2019-12-31 DIAGNOSIS — F33.41 RECURRENT MAJOR DEPRESSIVE DISORDER, IN PARTIAL REMISSION (HCC): Primary | ICD-10-CM

## 2019-12-31 PROBLEM — R60.0 LEG EDEMA: Status: RESOLVED | Noted: 2018-03-15 | Resolved: 2019-12-31

## 2019-12-31 PROBLEM — F33.9 RECURRENT MAJOR DEPRESSIVE DISORDER (HCC): Status: ACTIVE | Noted: 2019-12-31

## 2019-12-31 RX ORDER — SERTRALINE HYDROCHLORIDE 100 MG/1
TABLET, FILM COATED ORAL
Qty: 135 TAB | Refills: 1 | Status: SHIPPED | OUTPATIENT
Start: 2019-12-31 | End: 2020-01-07 | Stop reason: SDUPTHER

## 2019-12-31 NOTE — PROGRESS NOTES
Elvin Slater is a 71 y.o. female who was seen in clinic today (12/31/2019) to establish care. She was previously followed by Dr Hector Ferrari:      Diagnoses and all orders for this visit:    1. Recurrent major depressive disorder, in partial remission ([facility]- new dx to me, chronic problem, sounds stable for her, reviewed treatment options with her, reviewed life style changes to help improve mood, reviewed role of daily vs prn meds, continue current treatment. -     sertraline (ZOLOFT) 100 mg tablet; TAKE 1 & 1/2 TABLETS DAILY    2. ENIO (generalized anxiety disorder)- new dx to me, chronic problem, well controlled, reviewed treatment options with her, reviewed life style changes to help improve mood, reviewed role of daily vs prn meds, continue current treatment. Did review Wellbutrin can be driving anxiety but she does not recall a specific change when this was started. -     sertraline (ZOLOFT) 100 mg tablet; TAKE 1 & 1/2 TABLETS DAILY    3. Hypercholesteremia- new diagnosis to me, chronic problem, well controlled, continue current treatment      4. Prediabetes- new diagnosis to me, chronic problem, stable, reviewed role of diet and weight. No medication changes. 5. Overweight (BMI 25.0-29. 9)- Weight is stable, this is a chronic issue to me but a chronic issue, I have recommended the following interventions: encourage exercise and lifestyle education regarding diet. 6. Tobacco dependence- new dx to me, chronic issue. The patient was counseled on the dangers of tobacco use, and was advised to quit and reluctant to quit. Reviewed strategies to maximize success, including removing cigarettes and smoking materials from environment, stress management and substitution of other forms of reinforcement. 7. Neurofibroma determined by biopsy of tongue ([facility]- new dx, she is followed by ENT, no changes in the last few years, will monitor.   Will defer to specialist.       Follow-up and Dispositions    · Return in about 6 months (around 6/30/2020) for FULL PHYSICAL - 30 minutes. Subjective:   Gary Hagen was seen today for depression and hyperlipidemia    Cardiovascular Review  The patient has hyperlipidemia. She has been on medication x 2 yrs. Was on atorvastatin in the past, not sure if there were side effects. She reports taking medications as instructed, no medication side effects noted. Diet and Lifestyle: generally follows a low fat low cholesterol diet, generally follows a low sodium diet, exercises sporadically. Labs: reviewed and discussed with patient. Endocrine Review  She is seen for pre-diabetes. Since last visit: labs completed & reviewed with her. Home glucose monitoring: is not performed. Diabetic diet compliance: compliant most of the time. Pulmonary Review  The patient is being seen for tobacco abuse. She admits to smoking 5-10 cigarettes today. She does not desire to quit. She has quit in the past but always restarted. Hard to stay focused. Current limitations in activity: none. Coughing when present is described as none. Mental Health Review  Patient is seen for anxiety & depression. She reports being on Wellbutrin x 3 years. Zoloft has been for ~8 yrs, dose has been increased, and has been stable for the last few years. She reports anxiety is slightly worse then depression. She reports waking up anxious. No specific triggers for anxiety. PHQ9 and GAD7 reviewed. Reports experiences the following side effects from the treatment: none. She has seen a counselor in the past, nothing recently. ENIO 2/7 12/31/2019   Feeling nervous, anxious or on edge? 2   Not being able to stop or control worrying? 1   ENIO-2 Subtotal 3   Worrying too much about different things? 3   Trouble relaxing? 2   Being so restless that it is hard to sit still? 0   Becoming easily annoyed or irritable?  0   Feeling afraid as if something awful might happen? 0   ENIO-7 Total Score 8   If you checked off any problems, how difficult have these problems made it for you to do your work, take care of thinks at home, or get along with other people? Somewhat difficult         3 most recent PHQ Screens 12/31/2019   Little interest or pleasure in doing things Not at all   Feeling down, depressed, irritable, or hopeless Several days   Total Score PHQ 2 1   Trouble falling or staying asleep, or sleeping too much Several days   Feeling tired or having little energy Several days   Poor appetite, weight loss, or overeating Several days   Feeling bad about yourself - or that you are a failure or have let yourself or your family down Not at all   Trouble concentrating on things such as school, work, reading, or watching TV Nearly every day   Moving or speaking so slowly that other people could have noticed; or the opposite being so fidgety that others notice Not at all   Thoughts of being better off dead, or hurting yourself in some way Not at all   PHQ 9 Score 7   How difficult have these problems made it for you to do your work, take care of your home and get along with others Not difficult at all         The following sections were reviewed & updated as appropriate: PMH, PSH, FH, and SH. Patient Active Problem List   Diagnosis Code    Tobacco dependence F17.200    Osteopenia M85.80    Post-nasal drip R09.82    ENIO (generalized anxiety disorder) F41.1    Hypovitaminosis D E55.9    Hypercholesteremia E78.00    Elevated LDL cholesterol level E78.00    Neurofibroma determined by biopsy of tongue ([facility] Q85.00    Prediabetes R73.03    Incidental lung nodule R91.1    Palpitations R00.2    Recurrent major depressive disorder ([facility] F33.9          Prior to Admission medications    Medication Sig Start Date End Date Taking? Authorizing Provider   cranberry fruit extract (CRANBERRY PO) Take  by mouth daily.    Yes Provider, Historical   sertraline (ZOLOFT) 100 mg tablet TAKE 1 & 1/2 TABLETS DAILY 12/31/19  Yes Mackenzie Casarez MD   rosuvastatin (CRESTOR) 10 mg tablet Take 1 Tab by mouth nightly. 12/20/19  Yes Mackenzie Casarez MD   buPROPion XL (WELLBUTRIN XL) 300 mg XL tablet TAKE ONE TABLET DAILY. 6/1/19  Yes Josie Ayala MD   naproxen sodium (ALEVE PO) Take  by mouth as needed. Yes Provider, Historical   aspirin delayed-release 81 mg tablet Take  by mouth daily. Yes Provider, Historical   ibuprofen (MOTRIN) 200 mg tablet Take 400 mg by mouth every six (6) hours as needed for Pain. Yes Provider, Historical   FEXOFENADINE HCL (ALLEGRA PO) Take 1 Tab by mouth daily as needed. Yes Provider, Historical   cinnamon bark (CINNAMON) 500 mg cap Take 2 Tabs by mouth daily. Yes Provider, Historical   cholecalciferol (VITAMIN D3) 1,000 unit tablet Take 2,000 Units by mouth two (2) times a day. Yes Provider, Historical   sertraline (ZOLOFT) 100 mg tablet TAKE 1 & 1/2 TABLETS DAILY 4/2/19 12/31/19  Josie Ayala MD          Allergies   Allergen Reactions    Keflex [Cephalexin] Nausea Only              Review of Systems   Constitutional: Negative for malaise/fatigue and weight loss. Respiratory: Negative for cough and shortness of breath. Cardiovascular: Negative for chest pain, palpitations and leg swelling. Gastrointestinal: Negative for abdominal pain, constipation, diarrhea, heartburn, nausea and vomiting. Genitourinary: Negative for frequency. Musculoskeletal: Negative for joint pain and myalgias. Skin: Negative for rash. Neurological: Negative for tingling, sensory change, focal weakness and headaches. Psychiatric/Behavioral: Negative for depression. The patient is not nervous/anxious and does not have insomnia. Objective:   Physical Exam  Constitutional:       General: She is not in acute distress. Appearance: Normal appearance.    Eyes:      Conjunctiva/sclera: Conjunctivae normal.   Cardiovascular:      Rate and Rhythm: Regular rhythm. Heart sounds: No murmur. Pulmonary:      Effort: Pulmonary effort is normal.      Breath sounds: Normal breath sounds. No decreased breath sounds or wheezing. Abdominal:      General: Bowel sounds are normal.      Palpations: Abdomen is soft. Tenderness: There is no tenderness. Musculoskeletal:      Right lower leg: No edema. Left lower leg: No edema. Psychiatric:         Mood and Affect: Mood and affect normal.            Visit Vitals  /72   Pulse 82   Temp 98.6 °F (37 °C) (Oral)   Resp 16   Ht 5' 5\" (1.651 m)   Wt 175 lb (79.4 kg)   SpO2 97%   BMI 29.12 kg/m²          Advised her to call back or return to office if symptoms worsen/change/persist.  Discussed expected course/resolution/complications of diagnosis in detail with patient. Medication risks/benefits/costs/interactions/alternatives discussed with patient. She was given an after visit summary which includes diagnoses, current medications, & vitals. She expressed understanding with the diagnosis and plan. Aspects of this note may have been generated using voice recognition software. Despite editing, there may be some syntax errors.        Maxx Willard MD

## 2019-12-31 NOTE — PATIENT INSTRUCTIONS

## 2020-01-05 DIAGNOSIS — M89.9 DISORDER OF BONE AND CARTILAGE: Primary | ICD-10-CM

## 2020-01-05 DIAGNOSIS — M94.9 DISORDER OF BONE AND CARTILAGE: Primary | ICD-10-CM

## 2020-01-07 ENCOUNTER — PATIENT MESSAGE (OUTPATIENT)
Dept: INTERNAL MEDICINE CLINIC | Age: 65
End: 2020-01-07

## 2020-01-07 DIAGNOSIS — F33.41 RECURRENT MAJOR DEPRESSIVE DISORDER, IN PARTIAL REMISSION (HCC): ICD-10-CM

## 2020-01-07 DIAGNOSIS — F41.1 GAD (GENERALIZED ANXIETY DISORDER): ICD-10-CM

## 2020-01-07 RX ORDER — SERTRALINE HYDROCHLORIDE 100 MG/1
TABLET, FILM COATED ORAL
Qty: 135 TAB | Refills: 1 | Status: SHIPPED | OUTPATIENT
Start: 2020-01-07 | End: 2020-08-24 | Stop reason: SDUPTHER

## 2020-01-07 NOTE — TELEPHONE ENCOUNTER
Transmission failed for Sertraline. RX resent per [place] [facility] Dr. Maday Blackburn. Transmission failed a second time. RX called to Xochilt Graves [facility] per [place] [facility] Dr. Maday Blackburn.

## 2020-01-07 NOTE — TELEPHONE ENCOUNTER
From: Christiana Beckford  To: Etienne Bee MD  Sent: 1/7/2020 7:13 AM EST  Subject: Prescription Question    Dr. Maryellen Welch hasn't received the refill approval for my sertraline yet. Hoping someone can get that to them today! Thank you!   Bahman Aguero

## 2020-01-24 ENCOUNTER — HOSPITAL ENCOUNTER (OUTPATIENT)
Dept: MAMMOGRAPHY | Age: 65
Discharge: HOME | End: 2020-01-24
Attending: INTERNAL MEDICINE
Payer: COMMERCIAL

## 2020-01-24 DIAGNOSIS — M89.9 DISORDER OF BONE AND CARTILAGE: ICD-10-CM

## 2020-01-24 DIAGNOSIS — M94.9 DISORDER OF BONE AND CARTILAGE: ICD-10-CM

## 2020-01-24 PROCEDURE — 77080 DXA BONE DENSITY AXIAL: CPT

## 2020-01-26 RX ORDER — ALENDRONATE SODIUM 35 MG/1
35 TABLET ORAL
Qty: 12 TAB | Refills: 1 | Status: SHIPPED | OUTPATIENT
Start: 2020-01-26 | End: 2020-09-30 | Stop reason: SDUPTHER

## 2020-02-13 ENCOUNTER — OFFICE VISIT (OUTPATIENT)
Dept: SLEEP MEDICINE | Age: 65
End: 2020-02-13

## 2020-02-13 VITALS
SYSTOLIC BLOOD PRESSURE: 111 MMHG | HEIGHT: 65 IN | BODY MASS INDEX: 29.62 KG/M2 | OXYGEN SATURATION: 96 % | WEIGHT: 177.8 LBS | HEART RATE: 75 BPM | TEMPERATURE: 98.7 F | DIASTOLIC BLOOD PRESSURE: 67 MMHG

## 2020-02-13 DIAGNOSIS — G47.33 OBSTRUCTIVE SLEEP APNEA (ADULT) (PEDIATRIC): Primary | ICD-10-CM

## 2020-02-13 DIAGNOSIS — R73.03 PREDIABETES: ICD-10-CM

## 2020-02-13 DIAGNOSIS — Z86.59 H/O: DEPRESSION: ICD-10-CM

## 2020-02-13 NOTE — PATIENT INSTRUCTIONS
[address] [place]. [place], [address]  Tel.  [phone]  Fax. [address] [facility] [address]  [place], [address] [facility]  Tel.  [phone]  Fax. [phone] [address] [place]  Tel.  [phone]  Fax. [phone]     Learning About CPAP for Sleep Apnea  What is CPAP? CPAP is a small machine that you use at home every night while you sleep. It increases air pressure in your throat to keep your airway open. When you have sleep apnea, this can help you sleep better so you feel much better. CPAP stands for \"continuous positive airway pressure. \"  The CPAP machine will have one of the following:  · A mask that covers your nose and mouth  · Prongs that fit into your nose  · A mask that covers your nose only, the most common type. This type is called NCPAP. The N stands for \"nasal.\"  Why is it done? CPAP is usually the best treatment for obstructive sleep apnea. It is the first treatment choice and the most widely used. Your doctor may suggest CPAP if you have:  · Moderate to severe sleep apnea. · Sleep apnea and coronary artery disease (CAD) or heart failure. How does it help? · CPAP can help you have more normal sleep, so you feel less sleepy and more alert during the daytime. · CPAP may help keep heart failure or other heart problems from getting worse. · NCPAP may help lower your blood pressure. · If you use CPAP, your bed partner may also sleep better because you are not snoring or restless. What are the side effects? Some people who use CPAP have:  · A dry or stuffy nose and a sore throat. · Irritated skin on the face. · Sore eyes. · Bloating. If you have any of these problems, work with your doctor to fix them. Here are some things you can try:  · Be sure the mask or nasal prongs fit well. · See if your doctor can adjust the pressure of your CPAP. · If your nose is dry, try a humidifier.   · If your nose is runny or stuffy, try decongestant medicine or a steroid nasal spray. If these things do not help, you might try a different type of machine. Some machines have air pressure that adjusts on its own. Others have air pressures that are different when you breathe in than when you breathe out. This may reduce discomfort caused by too much pressure in your nose. Where can you learn more? Go to Reqlut.be  Enter Sophia Joy in the search box to learn more about \"Learning About CPAP for Sleep Apnea. \"   © 5032-9805 Healthwise, Incorporated. Care instructions adapted under license by [facility] [employer] (which disclaims liability or warranty for this information). This care instruction is for use with your licensed healthcare professional. If you have questions about a medical condition or this instruction, always ask your healthcare professional. [address] any warranty or liability for your use of this information. Content Version: 9.6.51254; Last Revised: January 11, 2010  PROPER SLEEP HYGIENE    What to avoid  · Do not have drinks with caffeine, such as coffee or black tea, for 8 hours before bed. · Do not smoke or use other types of tobacco near bedtime. Nicotine is a stimulant and can keep you awake. · Avoid drinking alcohol late in the evening, because it can cause you to wake in the middle of the night. · Do not eat a big meal close to bedtime. If you are hungry, eat a light snack. · Do not drink a lot of water close to bedtime, because the need to urinate may wake you up during the night. · Do not read or watch TV in bed. Use the bed only for sleeping and sexual activity. What to try  · Go to bed at the same time every night, and wake up at the same time every morning. Do not take naps during the day. · Keep your bedroom quiet, dark, and cool. · Get regular exercise, but not within 3 to 4 hours of your bedtime. .  · Sleep on a comfortable pillow and mattress.   · If watching the clock makes you anxious, turn it facing away from you so you cannot see the time. · If you worry when you lie down, start a worry book. Well before bedtime, write down your worries, and then set the book and your concerns aside. · Try meditation or other relaxation techniques before you go to bed. · If you cannot fall asleep, get up and go to another room until you feel sleepy. Do something relaxing. Repeat your bedtime routine before you go to bed again. · Make your house quiet and calm about an hour before bedtime. Turn down the lights, turn off the TV, log off the computer, and turn down the volume on music. This can help you relax after a busy day. Drowsy Driving: The [employer] cites drowsiness as a causing factor in more than 821,542 police reported crashes annually, resulting in 76,000 injuries and 1,500 deaths. Other surveys suggest 55% of people polled have driven while drowsy in the past year, 23% had fallen asleep but not crashed, 3% crashed, and 2% had and accident due to drowsy driving. Who is at risk? Young Drivers: One study of drowsy driving accidents states that 55% of the drivers were under 25 years. Of those, 75% were male. Shift Workers and Travelers: People who work overnight or travel across time zones frequently are at higher risk of experiencing Circadian Rhythm Disorders. They are trying to work and function when their body is programed to sleep. Sleep Deprived: Lack of sleep has a serious impact on your ability to pay attention or focus on a task. Consistently getting less than the average of 8 hours your body needs creates partial or cumulative sleep deprivation. Untreated Sleep Disorders: Sleep Apnea, Narcolepsy, R.L.S., and other sleep disorders (untreated) prevent a person from getting enough restful sleep. This leads to excessive daytime sleepiness and increases the risk for drowsy driving accidents by up to 7 times.   Medications / Alcohol: Even over the counter medications can cause drowsiness. Medications that impair a drivers attention should have a warning label. Alcohol naturally makes you sleepy and on its own can cause accidents. Combined with excessive drowsiness its effects are amplified. Signs of Drowsy Driving:   * You don't remember driving the last few miles   * You may drift out of your leslie   * You are unable to focus and your thoughts wander   * You may yawn more often than normal   * You have difficulty keeping your eyes open / nodding off   * Missing traffic signs, speeding, or tailgating  Prevention-   Good sleep hygiene, lifestyle and behavioral choices have the most impact on drowsy driving. There is no substitute for sleep and the average person requires 8 hours nightly. If you find yourself driving drowsy, stop and sleep. Consider the sleep hygiene tips provided during your visit as well. Medication Refill Policy: Refills for all medications require 1 week advance notice. Please have your pharmacy fax a refill request. We are unable to fax, or call in \"controled substance\" medications and you will need to pick these prescriptions up from our office. Effective Measure Activation    Thank you for requesting access to Effective Measure. Please follow the instructions below to securely access and download your online medical record. Effective Measure allows you to send messages to your doctor, view your test results, renew your prescriptions, schedule appointments, and more. How Do I Sign Up? 1. In your internet browser, go to [email or web address]. 2. Click on the First Time User? Click Here link in the Sign In box. You will see the New Member Sign Up page. 3. Enter your Effective Measure Access Code exactly as it appears below. You will not need to use this code after youve completed the sign-up process. If you do not sign up before the expiration date, you must request a new code. Effective Measure Access Code:  Activation code not generated  Current Motopia Status: Active (This is the date your Motopia access code will )    4. Enter the last four digits of your Social Security Number (xxxx) and Date of Birth (mm/dd/yyyy) as indicated and click Submit. You will be taken to the next sign-up page. 5. Create a BitRockt ID. This will be your Motopia login ID and cannot be changed, so think of one that is secure and easy to remember. 6. Create a Motopia password. You can change your password at any time. 7. Enter your Password Reset Question and Answer. This can be used at a later time if you forget your password. 8. Enter your e-mail address. You will receive e-mail notification when new information is available in [address] 9. Click Sign Up. You can now view and download portions of your medical record. 10. Click the Download Summary menu link to download a portable copy of your medical information. Additional Information    If you have questions, please call [phone]. Remember, Motopia is NOT to be used for urgent needs. For medical emergencies, dial 911.

## 2020-02-13 NOTE — PROGRESS NOTES
[address] [facility] [place]. [place], [address]   Tel.  [phone]   Fax. 100 [facility] [address]   [place], [address]   Tel.  [phone]   Fax. [phone] [address]   Tel.  [phone]   Fax. [phone]       Subjective:   Daren Barboza is a 71 y.o. female seen for a positive airway pressure follow-up, last seen by Dr. Valentina Ahuja on 2/20/2019, prior notes reviewed in detail. Home sleep test 12/2018 showed AHI of 14.2/hr with a lowest SaO2 of 80%. She  is here today for follow up. She has been using a borrowed device but that is no longer working and she would like to move forward with getting a PAP device. Order for APAP had been placed 12/2018. 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 [place] Sleepiness Score: 13 and Modified F.O.S.Q. Score Total / 2: 13.5 which reflects significant daytime sleepiness. Allergies   Allergen Reactions    Keflex [Cephalexin] Nausea Only       She has a current medication list which includes the following prescription(s): alendronate, sertraline, cranberry fruit extract, rosuvastatin, bupropion xl, naproxen sodium, aspirin delayed-release, ibuprofen, fexofenadine hcl, cinnamon bark, and cholecalciferol. .      She  has a past medical history of Anxiety disorder, Carpal tunnel syndrome of left wrist, DDD (degenerative disc disease), thoracic (10/13/14), Depression, Hypercholesterolemia, Menopause, Neurofibroma determined by biopsy of tongue ([facility] Prediabetes (06/2016), and Scoliosis (10/13/14). She also has no past medical history of Fracture. Sleep Review of Systems: notable for Negative difficulty falling asleep; Positive awakenings at night; Positive early morning headaches; Negative memory problems; Positive concentration issues;  Negative chest pain; Negative shortness of breath; Negative significant joint pain at night; Negative significant muscle pain at night; Negative rashes or itching; Negative heartburn; Negative significant mood issues; occasional afternoon naps    Objective:     Visit Vitals  /67   Pulse 75   Temp 98.7 °F (37.1 °C)   Ht 5' 5\" (1.651 m)   Wt 177 lb 12.8 oz (80.6 kg)   SpO2 96%   BMI 29.59 kg/m²          General:   Alert, oriented, not in acute distress   Eyes:  Anicteric Sclerae; no obvious strabismus   Nose:  No obvious nasal septum deviation    Oropharynx:   Mallampati score 2, normal tongue base, uvula seen, low-lying soft palate, narrow tonsilo-pharyngeal pilars   Neck:   Midline trachea   Chest/Lungs:  Symmetrical lung expansion, clear lung fields on auscultation    CVS:  Normal rate, regular rhythm,  no JVD   Extremities:  No obvious rashes, no edema    Neuro:  No focal deficits; No obvious tremor    Psych:  Normal affect,  normal countenance       Assessment:       ICD-10-CM ICD-9-CM    1. Obstructive sleep apnea (adult) (pediatric) G47.33 327.23 AMB SUPPLY ORDER   2. H/O: depression Z86.59 V11.8    3. Prediabetes R73.03 790.29    4. Adult BMI 29.0-29.9 kg/sq m Z68.29 V85.25        AHI = 14.2(12/2018). Patient has a history and examination consistent with the diagnosis of sleep apnea. Plan:     Follow-up and Dispositions    · Return in about 2 months (around 4/13/2020). * She was provided information on sleep apnea including corresponding risk factors and the importance of proper treatment. Treatment options were reviewed in detail. she would like to proceed with PAP therapy. Patient will be seen in follow-up in 6-8 weeks after PAP setup to gauge treatment response and adherence to therapy.      * Order for APAP device placed    Orders Placed This Encounter    AMB SUPPLY ORDER     Diagnosis: (G47.33) ASHER (obstructive sleep apnea)  (primary encounter diagnosis)       Respironics Dreamstation APAP 4 cm H20 to 10 cm H20    Heated Humidifier  Modem, tessie access to sleep center  Length of need 99    Mask-fitting evaluation and mask per patient preference     Nasal Pillows Combo Mask (Replace) 2 per month.  Nasal Pillows (Replace) 2 per month.  Nasal Cushion (Replace) 2 per month.  Nasal Interface Mask 1 every 3 months.  Full Face Mask 1 every 3 months.  Full Face Mask Cushion 1 per month.  Pos Airway pressure chin strap   Headgear 1 every 6 months.  Tubing with heating element 1 every 3 months.  Filter(s) Disposable 2 per month.  Filter(s) Non-Disposable 1 every 6 months. .   [address] [facility] for Humidifier (Replace) 1 every 6 months. EMANUEL Oh; NPI: [id number]    Electronically signed. Date:- 02/13/20     * The patient was counselled regarding proper sleep hygiene, with emphasis on ensuring sufficient total sleep time; safe driving and the benefits of exercise and weight loss. * All of her questions were addressed. 2. H/O Depression - continue on her current regimen. 3. Prediabetes -  she continues on her current regimen. I have reviewed the relationship between sleep disordered breathing as it relates to diabetes. 4. Recommended a dedicated weight loss program through appropriate diet and exercise regimen as significant weight reduction has been shown to reduce severity of obstructive sleep apnea. EMANUEL Oh, [address] [place] [address]  Electronically signed.  02/13/20

## 2020-02-27 ENCOUNTER — DOCUMENTATION ONLY (OUTPATIENT)
Dept: SLEEP MEDICINE | Age: 65
End: 2020-02-27

## 2020-03-20 DIAGNOSIS — F41.1 GAD (GENERALIZED ANXIETY DISORDER): ICD-10-CM

## 2020-03-20 DIAGNOSIS — R00.2 PALPITATIONS: ICD-10-CM

## 2020-03-20 DIAGNOSIS — F41.8 DEPRESSION WITH ANXIETY: ICD-10-CM

## 2020-03-20 DIAGNOSIS — F17.200 TOBACCO DEPENDENCE: ICD-10-CM

## 2020-03-20 DIAGNOSIS — E78.00 HYPERCHOLESTEREMIA: ICD-10-CM

## 2020-03-20 DIAGNOSIS — R60.0 LEG EDEMA: ICD-10-CM

## 2020-03-20 DIAGNOSIS — R29.818 SUSPECTED SLEEP APNEA: ICD-10-CM

## 2020-03-20 RX ORDER — ROSUVASTATIN CALCIUM 10 MG/1
10 TABLET, COATED ORAL
Qty: 90 TAB | Refills: 0 | Status: SHIPPED | OUTPATIENT
Start: 2020-03-20 | End: 2020-07-23

## 2020-03-20 RX ORDER — BUPROPION HYDROCHLORIDE 300 MG/1
TABLET ORAL
Qty: 90 TAB | Refills: 0 | Status: SHIPPED | OUTPATIENT
Start: 2020-03-20 | End: 2020-06-23

## 2020-04-14 ENCOUNTER — TELEPHONE (OUTPATIENT)
Dept: SLEEP MEDICINE | Age: 65
End: 2020-04-14

## 2020-04-16 ENCOUNTER — DOCUMENTATION ONLY (OUTPATIENT)
Dept: SLEEP MEDICINE | Age: 65
End: 2020-04-16

## 2020-04-16 ENCOUNTER — VIRTUAL VISIT (OUTPATIENT)
Dept: SLEEP MEDICINE | Age: 65
End: 2020-04-16

## 2020-04-16 VITALS — HEIGHT: 65 IN | WEIGHT: 177 LBS | BODY MASS INDEX: 29.49 KG/M2

## 2020-04-16 DIAGNOSIS — G47.33 OBSTRUCTIVE SLEEP APNEA (ADULT) (PEDIATRIC): Primary | ICD-10-CM

## 2020-04-16 DIAGNOSIS — Z86.59 H/O: DEPRESSION: ICD-10-CM

## 2020-04-16 NOTE — PATIENT INSTRUCTIONS
[address] [place]. [address]68 [facility], [address] Tel.  [phone] Fax. 100 [facility] 60 [place], [address] [facility] Tel.  [phone] Fax. [phone] [address] Mckeon Tel.  [phone] Fax. [phone] Learning About CPAP for Sleep Apnea What is CPAP? CPAP is a small machine that you use at home every night while you sleep. It increases air pressure in your throat to keep your airway open. When you have sleep apnea, this can help you sleep better so you feel much better. CPAP stands for \"continuous positive airway pressure. \" The CPAP machine will have one of the following: · A mask that covers your nose and mouth · Prongs that fit into your nose · A mask that covers your nose only, the most common type. This type is called NCPAP. The N stands for \"nasal.\" Why is it done? CPAP is usually the best treatment for obstructive sleep apnea. It is the first treatment choice and the most widely used. Your doctor may suggest CPAP if you have: · Moderate to severe sleep apnea. · Sleep apnea and coronary artery disease (CAD) or heart failure. How does it help? · CPAP can help you have more normal sleep, so you feel less sleepy and more alert during the daytime. · CPAP may help keep heart failure or other heart problems from getting worse. · NCPAP may help lower your blood pressure. · If you use CPAP, your bed partner may also sleep better because you are not snoring or restless. What are the side effects? Some people who use CPAP have: · A dry or stuffy nose and a sore throat. · Irritated skin on the face. · Sore eyes. · Bloating. If you have any of these problems, work with your doctor to fix them. Here are some things you can try: · Be sure the mask or nasal prongs fit well. · See if your doctor can adjust the pressure of your CPAP. · If your nose is dry, try a humidifier. · If your nose is runny or stuffy, try decongestant medicine or a steroid nasal spray. If these things do not help, you might try a different type of machine. Some machines have air pressure that adjusts on its own. Others have air pressures that are different when you breathe in than when you breathe out. This may reduce discomfort caused by too much pressure in your nose. Where can you learn more? Go to [employer].be Enter R076 in the search box to learn more about \"Learning About CPAP for Sleep Apnea. \"  
© 0658-9150 Healthwise, Incorporated. Care instructions adapted under license by [employer] (which disclaims liability or warranty for this information). This care instruction is for use with your licensed healthcare professional. If you have questions about a medical condition or this instruction, always ask your healthcare professional. [address] any warranty or liability for your use of this information. Content Version: 7.3.10537; Last Revised: January 11, 2010 PROPER SLEEP HYGIENE What to avoid · Do not have drinks with caffeine, such as coffee or black tea, for 8 hours before bed. · Do not smoke or use other types of tobacco near bedtime. Nicotine is a stimulant and can keep you awake. · Avoid drinking alcohol late in the evening, because it can cause you to wake in the middle of the night. · Do not eat a big meal close to bedtime. If you are hungry, eat a light snack. · Do not drink a lot of water close to bedtime, because the need to urinate may wake you up during the night. · Do not read or watch TV in bed. Use the bed only for sleeping and sexual activity. What to try · Go to bed at the same time every night, and wake up at the same time every morning. Do not take naps during the day. · Keep your bedroom quiet, dark, and cool. · Get regular exercise, but not within 3 to 4 hours of your bedtime. Lesli Hannon · Sleep on a comfortable pillow and mattress. · If watching the clock makes you anxious, turn it facing away from you so you cannot see the time. · If you worry when you lie down, start a worry book. Well before bedtime, write down your worries, and then set the book and your concerns aside. · Try meditation or other relaxation techniques before you go to bed. · If you cannot fall asleep, get up and go to another room until you feel sleepy. Do something relaxing. Repeat your bedtime routine before you go to bed again. · Make your house quiet and calm about an hour before bedtime. Turn down the lights, turn off the TV, log off the computer, and turn down the volume on music. This can help you relax after a busy day. Drowsy Driving: The [facility] 54 cites drowsiness as a causing factor in more than 387,572 police reported crashes annually, resulting in 76,000 injuries and 1,500 deaths. Other surveys suggest 55% of people polled have driven while drowsy in the past year, 23% had fallen asleep but not crashed, 3% crashed, and 2% had and accident due to drowsy driving. Who is at risk? Young Drivers: One study of drowsy driving accidents states that 55% of the drivers were under 25 years. Of those, 75% were male. Shift Workers and Travelers: People who work overnight or travel across time zones frequently are at higher risk of experiencing Circadian Rhythm Disorders. They are trying to work and function when their body is programed to sleep. Sleep Deprived: Lack of sleep has a serious impact on your ability to pay attention or focus on a task. Consistently getting less than the average of 8 hours your body needs creates partial or cumulative sleep deprivation.   
Untreated Sleep Disorders: Sleep Apnea, Narcolepsy, R.L.S., and other sleep disorders (untreated) prevent a person from getting enough restful sleep. This leads to excessive daytime sleepiness and increases the risk for drowsy driving accidents by up to 7 times. Medications / Alcohol: Even over the counter medications can cause drowsiness. Medications that impair a drivers attention should have a warning label. Alcohol naturally makes you sleepy and on its own can cause accidents. Combined with excessive drowsiness its effects are amplified. Signs of Drowsy Driving: * You don't remember driving the last few miles * You may drift out of your leslie * You are unable to focus and your thoughts wander * You may yawn more often than normal 
 * You have difficulty keeping your eyes open / nodding off * Missing traffic signs, speeding, or tailgating Prevention-  
Good sleep hygiene, lifestyle and behavioral choices have the most impact on drowsy driving. There is no substitute for sleep and the average person requires 8 hours nightly. If you find yourself driving drowsy, stop and sleep. Consider the sleep hygiene tips provided during your visit as well. Medication Refill Policy: Refills for all medications require 1 week advance notice. Please have your pharmacy fax a refill request. We are unable to fax, or call in \"controled substance\" medications and you will need to pick these prescriptions up from our office. Squareknot Activation Thank you for requesting access to Squareknot. Please follow the instructions below to securely access and download your online medical record. Squareknot allows you to send messages to your doctor, view your test results, renew your prescriptions, schedule appointments, and more. How Do I Sign Up? 1. In your internet browser, go to [email or web address]. 2. Click on the First Time User? Click Here link in the Sign In box. You will see the New Member Sign Up page. 3. Enter your Squareknot Access Code exactly as it appears below.  You will not need to use this code after youve completed the sign-up process. If you do not sign up before the expiration date, you must request a new code. Rev Access Code: Activation code not generated Current Rev Status: Active (This is the date your Rev access code will ) 4. Enter the last four digits of your Social Security Number (xxxx) and Date of Birth (mm/dd/yyyy) as indicated and click Submit. You will be taken to the next sign-up page. 5. Create a Tropic Networkst ID. This will be your Rev login ID and cannot be changed, so think of one that is secure and easy to remember. 6. Create a Rev password. You can change your password at any time. 7. Enter your Password Reset Question and Answer. This can be used at a later time if you forget your password. 8. Enter your e-mail address. You will receive e-mail notification when new information is available in [address] 9. Click Sign Up. You can now view and download portions of your medical record. 10. Click the Download Summary menu link to download a portable copy of your medical information. Additional Information If you have questions, please call [phone]. Remember, Rev is NOT to be used for urgent needs. For medical emergencies, dial 911.

## 2020-04-16 NOTE — Clinical Note
Please pull respironics DL for prior 30 days and load today to chart - can you label as \"Compliant\" DL  thanks

## 2020-04-16 NOTE — Clinical Note
Please download on 4/17/20 for Ms.  Ucci and load to chart - compliance should be 70% for prior 30 days

## 2020-04-16 NOTE — PROGRESS NOTES
Patient and provider consent to to virtual visit via text to [phone], today with Reshma Gardner NP.

## 2020-04-16 NOTE — PROGRESS NOTES
[address] [facility] [place]. [place], [address]   Tel.  [phone]   Fax. 100 [facility] 60   [place], [address] [facility]   Tel.  [phone]   Fax. [phone] [address] Narinder Mckeon    Tel.  [phone]   Fax. [phone]       Subjective:   Alexandro Washington is a 71 y.o. female who was seen by synchronous (real-time) audio-video technology on 4/16/2020. Consent:  She and/or her healthcare decision maker is aware that this patient-initiated Telehealth encounter is a billable service, with coverage as determined by her insurance carrier. She is aware that she may receive a bill and has provided verbal consent to proceed: Yes    I was at home while conducting this encounter. Patient verified with 's License in chart. Alexandro Washington is seen for a positive airway pressure follow-up, last seen by me on 2/13/2020, seen by Dr. Ahmet Altamirano on 2/20/2019, prior notes reviewed in detail. Home sleep test 12/2018 showed AHI of 14.2/hr with a lowest SaO2 of 80%. She  is seen today for first adherence on device set up 3/5/2020. She reports no problems using the device, she was away the week after getting device so did not start immediately, she has been using device for the past 3 weeks. The following concerns reviewed:    Drowsiness no Problems exhaling no   Snoring no Forget to put on no   Mask Comfortable yes Can't fall asleep no   Dry Mouth no Mask falls off no   Air Leaking no Frequent awakenings no       She admits that her sleep has improved on PAP therapy using nasal mask and heated tubing. Review of device download indicated:  Auto pressure: 4-10 cmH2O; Peak Avg Pressure: 7.9 cmH2O;  Mean Pressure: 6.1 cmH2O      Average % Night in Large Leak:  0.8  % Used Days >= 4 hours: 70. Avg hours used:  5:45. Therapy Apnea Index averaged over PAP use: 4.6 /hr which reflects improved sleep breathing condition.     [place] Sleepiness Score: 10 and Modified F.O.S.Q. Score Total / 2: 11.5 which reflects improved sleep quality over therapy time. Allergies   Allergen Reactions    Keflex [Cephalexin] Nausea Only     She has a current medication list which includes the following prescription(s): rosuvastatin, bupropion xl, alendronate, sertraline, cranberry fruit extract, naproxen sodium, aspirin delayed-release, ibuprofen, fexofenadine hcl, cinnamon bark, and cholecalciferol. .      She  has a past medical history of Anxiety disorder, Carpal tunnel syndrome of left wrist, DDD (degenerative disc disease), thoracic (10/13/14), Depression, Hypercholesterolemia, Menopause, Neurofibroma determined by biopsy of tongue ([facility] [address] Prediabetes (06/2016), and Scoliosis (10/13/14). She also has no past medical history of Fracture. Sleep Review of Systems: notable for Negative difficulty falling asleep; Positive awakenings at night; Negative early morning headaches; Negative memory problems; Negative concentration issues; Negative chest pain; Negative shortness of breath; Negative significant joint pain at night; Negative significant muscle pain at night; Negative rashes or itching; Negative heartburn; Negative significant mood issues; brief daily afternoon naps     Objective:   Vitals reported by patient to Medical Assistant    Visit Vitals  Ht 5' 5\" (1.651 m)   Wt 177 lb (80.3 kg)   BMI 29.45 kg/m²        Physical Exam completed by visual and auditory observation of patient with verbal input from patient.     General:   Alert, oriented, not in acute distress   Eyes:  Anicteric Sclerae; no obvious strabismus   Nose:  No obvious nasal septum deviation    Neck:   Midline trachea, no visible mass   Chest/Lungs:  Respiratory effort normal, no visualized signs of difficulty breathing or respiratory distress   CVS:  No JVD   Extremities:  No obvious rashes noted on face, neck, or hands   Neuro:  No facial asymmetry, no focal deficits; no obvious tremor    Psych:  Normal affect, normal countenance       Assessment:       ICD-10-CM ICD-9-CM    1. Obstructive sleep apnea (adult) (pediatric) G47.33 327.23    2. H/O: depression Z86.59 V11.8    3. Adult BMI 29.0-29.9 kg/sq m Z68.29 V85.25        AHI = 14.2(12/2018). On APAP :  4-10 cmH2O. She is adherent with PAP therapy and PAP continues to benefit patient and remains necessary for control of her sleep apnea. Plan:     Follow-up and Dispositions    · Return in about 1 year (around 4/16/2021). * Continue on current pressures    * Tech to place updated download in chart    * Counseling was provided regarding the importance of regular PAP use with emphasis on ensuring sufficient total sleep time, proper sleep hygiene, and safe driving. * Re-enforced proper and regular cleaning for the device. * She was asked to contact our office for any problems regarding PAP therapy. 2. H/O Depression - continue on her current regimen. 3. Recommended a dedicated weight loss program through appropriate diet and exercise regimen as significant weight reduction has been shown to reduce severity of obstructive sleep apnea. Patient's phone number [phone] (cell)  was reviewed and confirmed for accuracy. She gives permission for messages regarding results and appointments to be left at that number. Pursuant to the emergency declaration under the [facility]5 waiver authority and the [employer] and [employer] General Act, this Virtual Visit was conducted, with patient's consent, to reduce the patient's risk of exposure to COVID-19 and provide continuity of care for an established patient. Services were provided through a video synchronous discussion virtually to substitute for in-person clinic visit. ABDOUL FishBC, [facility]  Electronically signed.  04/20/20

## 2020-04-17 ENCOUNTER — TELEPHONE (OUTPATIENT)
Dept: SLEEP MEDICINE | Age: 65
End: 2020-04-17

## 2020-04-17 NOTE — TELEPHONE ENCOUNTER
Called patient to schedule yearly PAP follow up per checkout notes from virtual visit. Patient states she will call us to schedule.

## 2020-06-23 DIAGNOSIS — F41.8 DEPRESSION WITH ANXIETY: ICD-10-CM

## 2020-06-23 DIAGNOSIS — F41.1 GAD (GENERALIZED ANXIETY DISORDER): ICD-10-CM

## 2020-06-23 DIAGNOSIS — F17.200 TOBACCO DEPENDENCE: ICD-10-CM

## 2020-06-23 DIAGNOSIS — G47.33 OBSTRUCTIVE SLEEP APNEA (ADULT) (PEDIATRIC): Primary | ICD-10-CM

## 2020-06-23 RX ORDER — BUPROPION HYDROCHLORIDE 300 MG/1
TABLET ORAL
Qty: 90 TAB | Refills: 0 | Status: SHIPPED | OUTPATIENT
Start: 2020-06-23 | End: 2020-09-28

## 2020-06-23 NOTE — PROGRESS NOTES
Patient last seen by me on 4/16/2020, previously seen by Dr. Ramiro Reid 2/20/2019, prior notes reviewed in detail.  Home sleep test 12/2018 showed AHI of 14.2/hr with a lowest SaO2 of 80%. device set up 3/5/2020. Pressure changed to APAP 7-12 cm H2O per review with patient, elevated AHI with max pressure being reached on current setting of 4-10 cm H2O.     Igor Zarate NP, [facility]  06/23/20

## 2020-06-24 NOTE — TELEPHONE ENCOUNTER
Future Appointments   Date Time Provider Luisito Robbins   7[facility]16/2020  5[facility]   9/30/2020 12:30 PM Lizeth Shannon MD [address] [facility]

## 2020-06-25 ENCOUNTER — DOCUMENTATION ONLY (OUTPATIENT)
Dept: SLEEP MEDICINE | Age: 65
End: 2020-06-25

## 2020-07-16 ENCOUNTER — HOSPITAL ENCOUNTER (OUTPATIENT)
Dept: MAMMOGRAPHY | Age: 65
Discharge: HOME | End: 2020-07-16
Attending: INTERNAL MEDICINE
Payer: COMMERCIAL

## 2020-07-16 DIAGNOSIS — Z12.31 VISIT FOR SCREENING MAMMOGRAM: ICD-10-CM

## 2020-07-16 PROCEDURE — 77063 BREAST TOMOSYNTHESIS BI: CPT

## 2020-07-23 DIAGNOSIS — R29.818 SUSPECTED SLEEP APNEA: ICD-10-CM

## 2020-07-23 DIAGNOSIS — E78.00 HYPERCHOLESTEREMIA: ICD-10-CM

## 2020-07-23 DIAGNOSIS — R00.2 PALPITATIONS: ICD-10-CM

## 2020-07-23 DIAGNOSIS — R60.0 LEG EDEMA: ICD-10-CM

## 2020-07-23 RX ORDER — ROSUVASTATIN CALCIUM 10 MG/1
TABLET, COATED ORAL
Qty: 90 TAB | Refills: 0 | Status: SHIPPED | OUTPATIENT
Start: 2020-07-23 | End: 2020-11-01

## 2020-07-24 NOTE — TELEPHONE ENCOUNTER
Future Appointments   Date Time Provider Luisito Robbins   9/30/2020 12:30 PM Ange Dobbs MD [id number] [facility]

## 2020-07-27 DIAGNOSIS — R00.2 PALPITATIONS: ICD-10-CM

## 2020-07-27 DIAGNOSIS — R60.0 LEG EDEMA: ICD-10-CM

## 2020-07-27 DIAGNOSIS — R29.818 SUSPECTED SLEEP APNEA: ICD-10-CM

## 2020-07-27 DIAGNOSIS — E78.00 HYPERCHOLESTEREMIA: ICD-10-CM

## 2020-07-27 RX ORDER — ROSUVASTATIN CALCIUM 10 MG/1
TABLET, COATED ORAL
Qty: 90 TAB | Refills: 0 | OUTPATIENT
Start: 2020-07-27

## 2020-07-27 NOTE — TELEPHONE ENCOUNTER
Requested Prescriptions     Pending Prescriptions Disp Refills    rosuvastatin (CRESTOR) 10 mg tablet 90 Tab 0         [facility] - [address] [place] [address] [address]  [phone]

## 2020-08-24 DIAGNOSIS — F33.41 RECURRENT MAJOR DEPRESSIVE DISORDER, IN PARTIAL REMISSION (HCC): ICD-10-CM

## 2020-08-24 DIAGNOSIS — F41.1 GAD (GENERALIZED ANXIETY DISORDER): ICD-10-CM

## 2020-08-24 RX ORDER — SERTRALINE HYDROCHLORIDE 100 MG/1
100 TABLET, FILM COATED ORAL DAILY
Qty: 90 TAB | Refills: 0 | Status: SHIPPED | OUTPATIENT
Start: 2020-08-24 | End: 2021-01-20 | Stop reason: SDUPTHER

## 2020-08-24 NOTE — TELEPHONE ENCOUNTER
Requested Prescriptions     Pending Prescriptions Disp Refills    sertraline (ZOLOFT) 100 mg tablet 135 Tab 1     Sig: TAKE 1 & 1/2 TABLETS DAILY       [facility] - [facility]   [phone]

## 2020-09-21 DIAGNOSIS — R73.03 PREDIABETES: ICD-10-CM

## 2020-09-21 DIAGNOSIS — E78.00 HYPERCHOLESTEREMIA: Primary | ICD-10-CM

## 2020-09-25 DIAGNOSIS — F41.8 DEPRESSION WITH ANXIETY: ICD-10-CM

## 2020-09-25 DIAGNOSIS — F41.1 GAD (GENERALIZED ANXIETY DISORDER): ICD-10-CM

## 2020-09-25 DIAGNOSIS — F17.200 TOBACCO DEPENDENCE: ICD-10-CM

## 2020-09-27 ENCOUNTER — TELEPHONE (OUTPATIENT)
Dept: INTERNAL MEDICINE CLINIC | Age: 65
End: 2020-09-27

## 2020-09-27 NOTE — TELEPHONE ENCOUNTER
On call- out of Wellbutrin as [address] [facility] [address] didn't deliver, feeling a bit dizzy- sounds in no distress.  Requested I send in 2 to [facility], which I did

## 2020-09-28 RX ORDER — BUPROPION HYDROCHLORIDE 300 MG/1
TABLET ORAL
Qty: 90 TAB | Refills: 1 | Status: SHIPPED | OUTPATIENT
Start: 2020-09-28 | End: 2021-06-18

## 2020-09-28 NOTE — TELEPHONE ENCOUNTER
Patient stopped by office to request that request be made high priority. She got 2 from the doctor on call this weekend.  Gets dizzy when she doesn't take medication

## 2020-09-29 LAB
ALBUMIN SERPL-MCNC: 4.1 G/DL (ref 3.8–4.8)
ALBUMIN/GLOB SERPL: 1.8 {RATIO} (ref 1.2–2.2)
ALP SERPL-CCNC: 93 IU/L (ref 39–117)
ALT SERPL-CCNC: 15 IU/L (ref 0–32)
AST SERPL-CCNC: 13 IU/L (ref 0–40)
BILIRUB SERPL-MCNC: 0.3 MG/DL (ref 0–1.2)
BUN SERPL-MCNC: 18 MG/DL (ref 8–27)
BUN/CREAT SERPL: 16 (ref 12–28)
CALCIUM SERPL-MCNC: 9.4 MG/DL (ref 8.7–10.3)
CHLORIDE SERPL-SCNC: 105 MMOL/L (ref 96–106)
CHOLEST SERPL-MCNC: 162 MG/DL (ref 100–199)
CO2 SERPL-SCNC: 23 MMOL/L (ref 20–29)
CREAT SERPL-MCNC: 1.16 MG/DL (ref 0.57–1)
ERYTHROCYTE [DISTWIDTH] IN BLOOD BY AUTOMATED COUNT: 13.6 % (ref 11.7–15.4)
EST. AVERAGE GLUCOSE BLD GHB EST-MCNC: 128 MG/DL
GLOBULIN SER CALC-MCNC: 2.3 G/DL (ref 1.5–4.5)
GLUCOSE SERPL-MCNC: 93 MG/DL (ref 65–99)
HBA1C MFR BLD: 6.1 % (ref 4.8–5.6)
HCT VFR BLD AUTO: 38.1 % (ref 34–46.6)
HDLC SERPL-MCNC: 67 MG/DL
HGB BLD-MCNC: 13 G/DL (ref 11.1–15.9)
LDLC SERPL CALC-MCNC: 74 MG/DL (ref 0–99)
MCH RBC QN AUTO: 29.4 PG (ref 26.6–33)
MCHC RBC AUTO-ENTMCNC: 34.1 G/DL (ref 31.5–35.7)
MCV RBC AUTO: 86 FL (ref 79–97)
PLATELET # BLD AUTO: 164 X10E3/UL (ref 150–450)
POTASSIUM SERPL-SCNC: 4.7 MMOL/L (ref 3.5–5.2)
PROT SERPL-MCNC: 6.4 G/DL (ref 6–8.5)
RBC # BLD AUTO: 4.42 X10E6/UL (ref 3.77–5.28)
SODIUM SERPL-SCNC: 140 MMOL/L (ref 134–144)
TRIGL SERPL-MCNC: 121 MG/DL (ref 0–149)
VLDLC SERPL CALC-MCNC: 21 MG/DL (ref 5–40)
WBC # BLD AUTO: 5.5 X10E3/UL (ref 3.4–10.8)

## 2020-09-29 NOTE — PROGRESS NOTES
Results released to patient via Sidense. All labs are stable or at goal for her. Cr stable. A1c stable, FBS normal.  She has an appointment tomorrow to review.

## 2020-09-30 ENCOUNTER — OFFICE VISIT (OUTPATIENT)
Dept: INTERNAL MEDICINE CLINIC | Age: 65
End: 2020-09-30
Payer: COMMERCIAL

## 2020-09-30 VITALS
SYSTOLIC BLOOD PRESSURE: 124 MMHG | TEMPERATURE: 97.3 F | WEIGHT: 191 LBS | HEIGHT: 66 IN | DIASTOLIC BLOOD PRESSURE: 88 MMHG | BODY MASS INDEX: 30.7 KG/M2 | HEART RATE: 80 BPM | RESPIRATION RATE: 16 BRPM | OXYGEN SATURATION: 98 %

## 2020-09-30 DIAGNOSIS — D36.10: ICD-10-CM

## 2020-09-30 DIAGNOSIS — E78.00 HYPERCHOLESTEREMIA: ICD-10-CM

## 2020-09-30 DIAGNOSIS — Z71.89 ADVANCED CARE PLANNING/COUNSELING DISCUSSION: ICD-10-CM

## 2020-09-30 DIAGNOSIS — M85.89 OSTEOPENIA OF MULTIPLE SITES: ICD-10-CM

## 2020-09-30 DIAGNOSIS — F33.41 RECURRENT MAJOR DEPRESSIVE DISORDER, IN PARTIAL REMISSION (HCC): ICD-10-CM

## 2020-09-30 DIAGNOSIS — F41.1 GAD (GENERALIZED ANXIETY DISORDER): ICD-10-CM

## 2020-09-30 DIAGNOSIS — Z00.00 ROUTINE PHYSICAL EXAMINATION: Primary | ICD-10-CM

## 2020-09-30 DIAGNOSIS — R73.03 PREDIABETES: ICD-10-CM

## 2020-09-30 DIAGNOSIS — Z71.89 EDUCATED ABOUT COVID-19 VIRUS INFECTION: ICD-10-CM

## 2020-09-30 DIAGNOSIS — N18.30 CKD (CHRONIC KIDNEY DISEASE), STAGE III (HCC): ICD-10-CM

## 2020-09-30 DIAGNOSIS — F17.200 TOBACCO DEPENDENCE: ICD-10-CM

## 2020-09-30 PROBLEM — R00.2 PALPITATIONS: Status: RESOLVED | Noted: 2018-03-15 | Resolved: 2020-09-30

## 2020-09-30 PROCEDURE — 99397 PER PM REEVAL EST PAT 65+ YR: CPT | Performed by: INTERNAL MEDICINE

## 2020-09-30 PROCEDURE — 99214 OFFICE O/P EST MOD 30 MIN: CPT | Performed by: INTERNAL MEDICINE

## 2020-09-30 RX ORDER — ALENDRONATE SODIUM 35 MG/1
35 TABLET ORAL
Qty: 12 TAB | Refills: 1 | Status: SHIPPED | OUTPATIENT
Start: 2020-09-30 | End: 2021-08-09 | Stop reason: SDUPTHER

## 2020-09-30 NOTE — PATIENT INSTRUCTIONS
Well Visit, Over 72: Care Instructions Your Care Instructions Physical exams can help you stay healthy. Your doctor has checked your overall health and may have suggested ways to take good care of yourself. He or she also may have recommended tests. At home, you can help prevent illness with healthy eating, regular exercise, and other steps. Follow-up care is a key part of your treatment and safety. Be sure to make and go to all appointments, and call your doctor if you are having problems. It's also a good idea to know your test results and keep a list of the medicines you take. How can you care for yourself at home? · Reach and stay at a healthy weight. This will lower your risk for many problems, such as obesity, diabetes, heart disease, and high blood pressure. · Get at least 30 minutes of exercise on most days of the week. Walking is a good choice. You also may want to do other activities, such as running, swimming, cycling, or playing tennis or team sports. · Do not smoke. Smoking can make health problems worse. If you need help quitting, talk to your doctor about stop-smoking programs and medicines. These can increase your chances of quitting for good. · Protect your skin from too much sun. When you're outdoors from 10 a.m. to 4 p.m., stay in the shade or cover up with clothing and a hat with a wide brim. Wear sunglasses that block UV rays. Even when it's cloudy, put broad-spectrum sunscreen (SPF 30 or higher) on any exposed skin. · See a dentist one or two times a year for checkups and to have your teeth cleaned. · Wear a seat belt in the car. Follow your doctor's advice about when to have certain tests. These tests can spot problems early. For men and women · Cholesterol. Your doctor will tell you how often to have this done based on your overall health and other things that can increase your risk for heart attack and stroke. · Blood pressure. Have your blood pressure checked during a routine doctor visit. Your doctor will tell you how often to check your blood pressure based on your age, your blood pressure results, and other factors. · Diabetes. Ask your doctor whether you should have tests for diabetes. · Vision. Experts recommend that you have yearly exams for glaucoma and other age-related eye problems. · Hearing. Tell your doctor if you notice any change in your hearing. You can have tests to find out how well you hear. · Colon cancer tests. Keep having colon cancer tests as your doctor recommends. You can have one of several types of tests. · Heart attack and stroke risk. At least every 4 to 6 years, you should have your risk for heart attack and stroke assessed. Your doctor uses factors such as your age, blood pressure, cholesterol, and whether you smoke or have diabetes to show what your risk for a heart attack or stroke is over the next 10 years. · Osteoporosis. Talk to your doctor about whether you should have a bone density test to find out whether you have thinning bones. Ask your doctor if you need to take a calcium plus vitamin D supplement. You may be able to get enough calcium and vitamin D through your diet. For women · Pap test and pelvic exam. You may no longer need a Pap test. Talk with your doctor about whether to stop or continue to have Pap tests. · Breast exam and mammogram. Ask how often you should have a mammogram, which is an X-ray of your breasts. A mammogram can spot breast cancer before it can be felt and when it is easiest to treat. · Thyroid disease. Talk to your doctor about whether to have your thyroid checked as part of a regular physical exam. Women have an increased chance of a thyroid problem. For men · Prostate exam. Talk to your doctor about whether you should have a blood test (called a PSA test) for prostate cancer. Experts recommend that you discuss the benefits and risks of the test with your doctor before you decide whether to have this test. Some experts say that men ages 79 and older no longer need testing. · Abdominal aortic aneurysm. Ask your doctor whether you should have a test to check for an aneurysm. You may need a test if you ever smoked or if your parent, brother, sister, or child has had an aneurysm. When should you call for help? Watch closely for changes in your health, and be sure to contact your doctor if you have any problems or symptoms that concern you. Where can you learn more? Go to http://zaid-ruddy.info/ Enter H051 in the search box to learn more about \"Well Visit, Over 65: Care Instructions. \" Current as of: May 27, 2020               Content Version: 12.6 © 7259-2210 [employer]. Care instructions adapted under license by [employer] (which disclaims liability or warranty for this information). If you have questions about a medical condition or this instruction, always ask your healthcare professional. [place] [address] any warranty or liability for your use of this information.

## 2020-09-30 NOTE — ACP (ADVANCE CARE PLANNING)
Advance Care Planning     Advance Care Planning (ACP) Physician/NP/PA (Provider) Conversation    Date of ACP Conversation: 09/30/20  Persons included in Conversation:  patient  Length of ACP Conversation in minutes: <16 minutes (Non-Billable)    Authorized Decision Maker (if patient is incapable of making informed decisions):   Named in Advance Directive or Healthcare Power of         She has an advanced directive - a copy has been provided & still reflects her wishes. Reviewed DNR/DNI and patient is not interested.          Kimi Perez MD

## 2020-09-30 NOTE — PROGRESS NOTES
Jayy Meyer is a 71 y.o. female who was seen in clinic today (9/30/2020) for a full physical.             Assessment & Plan:     Diagnoses and all orders for this visit:    1. Routine physical examination       -     Previous labs reviewed & discussed with her     2. Advanced care planning/counseling discussion    3. Educated about COVID-19 virus infection    4. Recurrent major depressive disorder, in partial remission ([address]- stable, at goal per her, I reviewed treatment options with her, I reviewed life style changes to help improve mood, continue current treatment. She declined med changes or counseling. 5. ENIO (generalized anxiety disorder)- stable, at goal for him, continue current treatment     6. Hypercholesteremia- well controlled, continue current treatment     7. Osteopenia of multiple sites- reviewed imaging, T-score of -2.4 in L-spine, reviewed rational of medications, she will restart    8. Tobacco dependence- stable, she was counseled on the dangers of tobacco use. She was advised to quit and reluctant to quit. Reviewed strategies to maximize success, including removing cigarettes and smoking materials from environment, stress management and substitution of other forms of reinforcement. 9. Prediabetes- stable, A1c unchanged, FBS normal, work on diet/weight    10. 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 CKD (chronic kidney disease), stage III- new dx, reviewed Cr trend, will monitor. Other orders  -     alendronate (FOSAMAX) 35 mg tablet; Take 1 Tab by mouth every seven (7) days. Follow-up and Dispositions    · Return in about 6 months (around 3/30/2021) for Regular follow up. Subjective:   Orion Wills is here today for a full physical.      Routine Physical Exam    Since last visit: weight has increased      End of Life Planning: This was discussed with her today and she has an advanced directive - a copy has been provided.   Reviewed DNR/DNI and patient is not interested. Depression Screen:  3 most recent PHQ Screens 9/30/2020   Little interest or pleasure in doing things Not at all   Feeling down, depressed, irritable, or hopeless Several days   Total Score PHQ 2 1   Trouble falling or staying asleep, or sleeping too much Not at all   Feeling tired or having little energy Several days   Poor appetite, weight loss, or overeating More than half the days   Feeling bad about yourself - or that you are a failure or have let yourself or your family down Several days   Trouble concentrating on things such as school, work, reading, or watching TV Nearly every day   Moving or speaking so slowly that other people could have noticed; or the opposite being so fidgety that others notice Not at all   Thoughts of being better off dead, or hurting yourself in some way Not at all   PHQ 9 Score 8   How difficult have these problems made it for you to do your work, take care of your home and get along with others Somewhat difficult         Fall Risk:   Fall Risk Assessment, last 12 mths 9/30/2020   Able to walk? Yes   Fall in past 12 months? No   Fall with injury? -   Number of falls in past 12 months -   Fall Risk Score -       Abuse Screen:  Abuse Screening Questionnaire 9/30/2020   Do you ever feel afraid of your partner? N   Are you in a relationship with someone who physically or mentally threatens you? N   Is it safe for you to go home? Y         Do you average more than 1 drink per night or more than 7 drinks a week:  No    On any one occasion in the past three months have you have had more than 3 drinks containing alcohol:  No    Functional Ability and Level of Safety:   Hearing Screen: Hearing is good.     Cognition Screen:  Has your family/caregiver stated any concerns about your memory: nothing specific, but she is worried about her family history    Ambulation: with no difficulty    Activities of Daily Living:    Exercise: not active  The home contains: no safety equipment. Patient does total self care    Adult Nutrition Screen:  No risk factors noted. Health Maintenance:   Daily Aspirin: no  Bone Density: done 1/24/20 - osteopenia (borderline osteoporosis). Glaucoma Screening: UTD    Immunizations:   Influenza: up to date  Tetanus: up to date  Shingles: not up to date - only received 1st vaccine  Pneumonia: up to date, due next year  Cancer screening:   Cervical: reviewed guidelines, n/a - s/p hysterectomy  Breast: reviewed guidelines, up to date  Colon: reviewed guidelines, up to date       In addition to the above issues we also reviewed the following acute and/or chronic problems:    Cardiovascular Review  The patient has hyperlipidemia. She reports taking medications as instructed, no medication side effects noted. Diet and Lifestyle: generally follows a low fat low cholesterol diet. Labs: reviewed and discussed with patient. Endocrine Review  She is seen for osteopenia. Since last visit: started on Fosamax due to t-score of -2.4 in L-spine. She was on the following treatment: fosamax 35mg weekly but is off of it due to forgetting to take the medication. She reports compliance with all meds, and denies any med side effects. She denies any falls or joint pain. Pulmonary Review  The patient is being seen for tobacco abuse. She admits to smoking 5 cigarettes today. She does not desire to quit. Current limitations in activity: none. Coughing when present is described as none. Mental Health Review  Patient is seen for anxiety & depression. GAD7 and PHQ9 reviewed. She reports this is who she is. She is happish. Sh has an vivid imagination. Reports experiences the following side effects from the treatment: none.        3 most recent PHQ Screens 9/30/2020   Little interest or pleasure in doing things Not at all   Feeling down, depressed, irritable, or hopeless Several days   Total Score PHQ 2 1   Trouble falling or staying asleep, or sleeping too much Not at all   Feeling tired or having little energy Several days   Poor appetite, weight loss, or overeating More than half the days   Feeling bad about yourself - or that you are a failure or have let yourself or your family down Several days   Trouble concentrating on things such as school, work, reading, or watching TV Nearly every day   Moving or speaking so slowly that other people could have noticed; or the opposite being so fidgety that others notice Not at all   Thoughts of being better off dead, or hurting yourself in some way Not at all   PHQ 9 Score 8   How difficult have these problems made it for you to do your work, take care of your home and get along with others Somewhat difficult       ENIO 2/7 9/30/2020 12/31/2019   Feeling nervous, anxious or on edge? 2 2   Not being able to stop or control worrying? 2 1   ENIO-2 Subtotal 4 3   Worrying too much about different things? 2 3   Trouble relaxing? 2 2   Being so restless that it is hard to sit still? 0 0   Becoming easily annoyed or irritable? 0 0   Feeling afraid as if something awful might happen? 1 0   ENIO-7 Total Score 9 8   If you checked off any problems, how difficult have these problems made it for you to do your work, take care of thinks at home, or get along with other people? Somewhat difficult Somewhat difficult           Patient Care Team:  Yuri Blair MD as PCP - General (Internal Medicine)  Yuri Blair MD as PCP [facility]  Lamin White MD (Cardiology)  Alfred Arreola MD (Obstetrics & Gynecology)  Stefanie Doyle MD (Ophthalmology)       The following sections were reviewed & updated as appropriate: Allergies, PMH, PSH, FH, and SH.       Patient Active Problem List   Diagnosis Code    Tobacco dependence F17.200    Osteopenia of multiple sites M85.89    Post-nasal drip R09.82    ENIO (generalized anxiety disorder) F41.1    Hypovitaminosis D E55.9    Hypercholesteremia E78.00    Neurofibroma determined by biopsy of tongue D36.10    Prediabetes R73.03    Recurrent major depressive disorder ([facility] [address] F33.9          Prior to Admission medications    Medication Sig Start Date End Date Taking? Authorizing Provider   CALCIUM PO Take  by mouth daily. Yes Provider, Historical   buPROPion XL (WELLBUTRIN XL) 300 mg XL tablet TAKE ONE TABLET DAILY. 9/28/20  Yes Macho Sykes MD   sertraline (ZOLOFT) 100 mg tablet Take 1 Tab by mouth daily. TAKE 1 & 1/2 TABLETS DAILY (patient reports dosage change made from 150mg to 100mg on 4/15. 8/24/20  Yes Clau Hilliard NP   rosuvastatin (CRESTOR) 10 mg tablet TAKE 1 TABLET AT NIGHT 7/23/20  Yes Macho Sykes MD   cranberry fruit extract (CRANBERRY PO) Take  by mouth daily. Yes Provider, Historical   naproxen sodium (ALEVE PO) Take  by mouth as needed. Yes Provider, Historical   ibuprofen (MOTRIN) 200 mg tablet Take 400 mg by mouth every six (6) hours as needed for Pain. Yes Provider, Historical   FEXOFENADINE HCL (ALLEGRA PO) Take 1 Tab by mouth daily as needed. Yes Provider, Historical   cinnamon bark (CINNAMON) 500 mg cap Take 2 Tabs by mouth daily. Yes Provider, Historical   cholecalciferol (VITAMIN D3) 1,000 unit tablet Take 2,000 Units by mouth two (2) times a day. Yes Provider, Historical   alendronate (FOSAMAX) 35 mg tablet Take 1 Tab by mouth every seven (7) days. 1/26/20   Macho Sykes MD   aspirin delayed-release 81 mg tablet Take  by mouth daily. Provider, Historical          Allergies   Allergen Reactions    Amoxicillin-Pot Clavulanate Nausea Only    Keflex [Cephalexin] Nausea Only    Nitrofurantoin Monohyd/M-Cryst Nausea Only              Review of Systems   Constitutional: Negative for malaise/fatigue and weight loss. Respiratory: Negative for cough and shortness of breath. Cardiovascular: Negative for chest pain, palpitations and leg swelling.    Gastrointestinal: Negative for abdominal pain, constipation, diarrhea, heartburn, nausea and vomiting. Musculoskeletal: Negative for joint pain and myalgias. Skin: Negative for rash. Neurological: Negative for dizziness and headaches. Psychiatric/Behavioral: Negative for depression. The patient is not nervous/anxious and does not have insomnia. Objective:   Physical Exam  Constitutional:       General: She is not in acute distress. Appearance: Normal appearance. She is obese. Eyes:      Conjunctiva/sclera: Conjunctivae normal.   Cardiovascular:      Rate and Rhythm: Regular rhythm. Heart sounds: No murmur. Pulmonary:      Effort: Pulmonary effort is normal.      Breath sounds: Normal breath sounds. No decreased breath sounds or wheezing. Abdominal:      General: Bowel sounds are normal.      Palpations: Abdomen is soft. Tenderness: There is no abdominal tenderness. Musculoskeletal:      Right lower leg: No edema. Left lower leg: No edema. Psychiatric:         Mood and Affect: Mood and affect normal.         Behavior: Behavior normal.            Visit Vitals  /88   Pulse 80   Temp 97.3 °F (36.3 °C) (Temporal)   Resp 16   Ht 5' 6.25\" (1.683 m)   Wt 191 lb (86.6 kg)   SpO2 98%   BMI 30.60 kg/m²          Advised her to call back or return to office if symptoms worsen/change/persist.  Discussed expected course/resolution/complications of diagnosis in detail with patient. Medication risks/benefits/costs/interactions/alternatives discussed with patient. She was given an after visit summary which includes diagnoses, current medications, & vitals. She expressed understanding with the diagnosis and plan. Aspects of this note may have been generated using voice recognition software. Despite editing, there may be some syntax errors.        Konrad Dominguez MD

## 2020-10-30 DIAGNOSIS — R29.818 SUSPECTED SLEEP APNEA: ICD-10-CM

## 2020-10-30 DIAGNOSIS — R00.2 PALPITATIONS: ICD-10-CM

## 2020-10-30 DIAGNOSIS — E78.00 HYPERCHOLESTEREMIA: ICD-10-CM

## 2020-10-30 DIAGNOSIS — R60.0 LEG EDEMA: ICD-10-CM

## 2020-11-01 RX ORDER — ROSUVASTATIN CALCIUM 10 MG/1
TABLET, COATED ORAL
Qty: 90 TAB | Refills: 1 | Status: SHIPPED | OUTPATIENT
Start: 2020-11-01 | End: 2021-08-27 | Stop reason: SDUPTHER

## 2021-01-20 DIAGNOSIS — F33.41 RECURRENT MAJOR DEPRESSIVE DISORDER, IN PARTIAL REMISSION (HCC): ICD-10-CM

## 2021-01-20 DIAGNOSIS — F41.1 GAD (GENERALIZED ANXIETY DISORDER): ICD-10-CM

## 2021-01-21 RX ORDER — SERTRALINE HYDROCHLORIDE 100 MG/1
TABLET, FILM COATED ORAL
Qty: 90 TAB | Refills: 0 | OUTPATIENT
Start: 2021-01-21

## 2021-01-21 RX ORDER — SERTRALINE HYDROCHLORIDE 100 MG/1
TABLET, FILM COATED ORAL
Qty: 135 TAB | Refills: 0 | Status: SHIPPED | OUTPATIENT
Start: 2021-01-21 | End: 2021-04-23 | Stop reason: SDUPTHER

## 2021-02-24 DIAGNOSIS — R73.03 PREDIABETES: Primary | ICD-10-CM

## 2021-02-26 ENCOUNTER — OFFICE VISIT (OUTPATIENT)
Dept: INTERNAL MEDICINE CLINIC | Age: 66
End: 2021-02-26
Payer: COMMERCIAL

## 2021-02-26 VITALS
RESPIRATION RATE: 16 BRPM | HEIGHT: 66 IN | TEMPERATURE: 97.1 F | WEIGHT: 196 LBS | SYSTOLIC BLOOD PRESSURE: 132 MMHG | HEART RATE: 84 BPM | OXYGEN SATURATION: 97 % | BODY MASS INDEX: 31.5 KG/M2 | DIASTOLIC BLOOD PRESSURE: 80 MMHG

## 2021-02-26 DIAGNOSIS — F33.41 RECURRENT MAJOR DEPRESSIVE DISORDER, IN PARTIAL REMISSION (HCC): Primary | ICD-10-CM

## 2021-02-26 DIAGNOSIS — Z87.891 HISTORY OF TOBACCO USE: ICD-10-CM

## 2021-02-26 DIAGNOSIS — R73.03 PREDIABETES: ICD-10-CM

## 2021-02-26 DIAGNOSIS — E66.9 OBESITY (BMI 30.0-34.9): ICD-10-CM

## 2021-02-26 DIAGNOSIS — R42 DIZZINESS: ICD-10-CM

## 2021-02-26 DIAGNOSIS — F41.1 GAD (GENERALIZED ANXIETY DISORDER): ICD-10-CM

## 2021-02-26 DIAGNOSIS — N18.31 STAGE 3A CHRONIC KIDNEY DISEASE (HCC): ICD-10-CM

## 2021-02-26 LAB
ALBUMIN SERPL-MCNC: 4.2 G/DL (ref 3.8–4.8)
ALBUMIN/GLOB SERPL: 1.9 {RATIO} (ref 1.2–2.2)
ALP SERPL-CCNC: 93 IU/L (ref 39–117)
ALT SERPL-CCNC: 18 IU/L (ref 0–32)
AST SERPL-CCNC: 16 IU/L (ref 0–40)
BILIRUB SERPL-MCNC: 0.3 MG/DL (ref 0–1.2)
BUN SERPL-MCNC: 16 MG/DL (ref 8–27)
BUN/CREAT SERPL: 13 (ref 12–28)
CALCIUM SERPL-MCNC: 9.4 MG/DL (ref 8.7–10.3)
CHLORIDE SERPL-SCNC: 108 MMOL/L (ref 96–106)
CO2 SERPL-SCNC: 23 MMOL/L (ref 20–29)
CREAT SERPL-MCNC: 1.2 MG/DL (ref 0.57–1)
EST. AVERAGE GLUCOSE BLD GHB EST-MCNC: 128 MG/DL
GLOBULIN SER CALC-MCNC: 2.2 G/DL (ref 1.5–4.5)
GLUCOSE SERPL-MCNC: 100 MG/DL (ref 65–99)
HBA1C MFR BLD: 6.1 % (ref 4.8–5.6)
POTASSIUM SERPL-SCNC: 4.8 MMOL/L (ref 3.5–5.2)
PROT SERPL-MCNC: 6.4 G/DL (ref 6–8.5)
SODIUM SERPL-SCNC: 144 MMOL/L (ref 134–144)

## 2021-02-26 PROCEDURE — 99214 OFFICE O/P EST MOD 30 MIN: CPT | Performed by: INTERNAL MEDICINE

## 2021-02-26 NOTE — PROGRESS NOTES
Rosi Long is a 76 y.o. female who was seen in clinic today (2/26/2021). Assessment & Plan:     Diagnoses and all orders for this visit:    1. Recurrent major depressive disorder, in partial remission ([address]- well controlled, continue current treatment     2. ENIO (generalized anxiety disorder)- well controlled, continue current treatment     3. Stage 3a chronic kidney disease- reviewed labs and reviewed dx with her, could be med related (statin), will monitor for now, stay hydrated    4. Prediabetes- stable, reviewed labs (FBS and A1c with her), diet changes, weight loss, no meds    5. History of tobacco use- new dx, quit on 12/30, congratulated, reviewed ways to continue this trend    6. Dizziness- this is a new problem, symptoms are: intermittent, differential dx reviewed with the patient, exact etiology is unclear at this time. Does not sound like BPPV or inner ear. Left prior to getting orthostatics. Will stay hydrated. Will monitor. Red flags were reviewed with the patient to RTC or notify me, expected time course for resolution reviewed. She will call back next week if no changes. 7. Obesity (BMI 30.0-34.9)- stable, poorly controlled, needs improvement, I have recommended the following interventions: encourage exercise and lifestyle education regarding diet. Follow-up and Dispositions    · Return in about 6 months (around 8/26/2021) for FULL PHYSICAL - 30 minutes. Subjective/Objective:   Mo Ni was seen today for Pre-diabetes, Depression, Anxiety, and Dizziness       Since last visit: quit smoking! Mental Health Review  Patient is seen for anxiety & depression. GAD7 and PHQ9 reviewed. Sleep is her biggest issue. She has a CPAP. At times will wake up with it off. Reports experiences the following side effects from the treatment: none. Pulmonary Review  The patient is being seen for tobacco abuse. She admits to smoking 0 cigarettes today. She quit on 12/20/20. Coughing when present is described as none. Endocrine Review  She is seen for pre-diabetes and obesity. Diabetic diet compliance: compliant most of the time. Lab review: labs reviewed and discussed with patient. Prior to Admission medications    Medication Sig Start Date End Date Taking? Authorizing Provider   sertraline (ZOLOFT) 100 mg tablet TAKE 1 & 1/2 TABLETS DAILY 1/21/21  Yes Rommel Padilla MD   rosuvastatin (CRESTOR) 10 mg tablet TAKE 1 TABLET AT NIGHT 11/1/20  Yes Rommel Padilla MD   CALCIUM PO Take  by mouth daily. Yes Provider, Historical   alendronate (FOSAMAX) 35 mg tablet Take 1 Tab by mouth every seven (7) days. 9/30/20  Yes Rommel Padilla MD   buPROPion XL (WELLBUTRIN XL) 300 mg XL tablet TAKE ONE TABLET DAILY. 9/28/20  Yes Rommel Padilla MD   cranberry fruit extract (CRANBERRY PO) Take  by mouth daily. Yes Provider, Historical   naproxen sodium (ALEVE PO) Take  by mouth as needed. Yes Provider, Historical   aspirin delayed-release 81 mg tablet Take  by mouth daily. Yes Provider, Historical   ibuprofen (MOTRIN) 200 mg tablet Take 400 mg by mouth every six (6) hours as needed for Pain. Yes Provider, Historical   FEXOFENADINE HCL (ALLEGRA PO) Take 1 Tab by mouth daily as needed. Yes Provider, Historical   cinnamon bark (CINNAMON) 500 mg cap Take 2 Tabs by mouth daily. Yes Provider, Historical   cholecalciferol (VITAMIN D3) 1,000 unit tablet Take 2,000 Units by mouth two (2) times a day. Yes Provider, Historical        Review of Systems   Neurological: Positive for dizziness (last few days, every day, intermittent, worse with standing, no issues w/ turning her head, resolves quickly, no other symptoms, no new medications). All other systems reviewed and are negative. Physical Exam  Constitutional:       General: She is not in acute distress. Appearance: Normal appearance.    HENT:      Right Ear: Tympanic membrane and ear canal normal.      Left Ear: Tympanic membrane and ear canal normal.   Eyes:      Conjunctiva/sclera: Conjunctivae normal.   Cardiovascular:      Rate and Rhythm: Regular rhythm. Heart sounds: No murmur. Pulmonary:      Effort: Pulmonary effort is normal.      Breath sounds: Normal breath sounds. No decreased breath sounds or wheezing. Abdominal:      General: Bowel sounds are normal.      Palpations: Abdomen is soft. Tenderness: There is no abdominal tenderness. Musculoskeletal:      Right lower leg: No edema. Left lower leg: No edema.    Psychiatric:         Mood and Affect: Mood and affect normal.         Visit Vitals  /80   Pulse 84   Temp 97.1 °F (36.2 °C) (Temporal)   Resp 16   Ht 5' 6.25\" (1.683 m)   Wt 196 lb (88.9 kg)   SpO2 97%   BMI 31.40 kg/m²         Lianet Rivas MD

## 2021-02-26 NOTE — PATIENT INSTRUCTIONS
Vertigo: Care Instructions Your Care Instructions Vertigo is the feeling that you or your surroundings are moving when there is no actual movement. It is often described as a feeling of spinning, whirling, falling, or tilting. Vertigo may make you vomit or feel nauseated. You may have trouble standing or walking and may lose your balance. Vertigo is often related to an inner ear problem, but it can have other more serious causes. If vertigo continues, you may need more tests to find its cause. Follow-up care is a key part of your treatment and safety. Be sure to make and go to all appointments, and call your doctor if you are having problems. It's also a good idea to know your test results and keep a list of the medicines you take. How can you care for yourself at home? · Do not lie flat on your back. Prop yourself up slightly. This may reduce the spinning feeling. Keep your eyes open. · Move slowly so that you do not fall. · If your doctor recommends medicine, take it exactly as directed. · Do not drive while you are having vertigo. Certain exercises, called Gonsales-Daroff exercises, can help decrease vertigo. To do Gonsales-Daroff exercises: · Sit on the edge of a bed or sofa and quickly lie down on the side that causes the worst vertigo. Lie on your side with your ear down. · Stay in this position for at least 30 seconds or until the vertigo goes away. · Sit up. If this causes vertigo, wait for it to stop. · Repeat the procedure on the other side. · Repeat this 10 times. Do these exercises 2 times a day until the vertigo is gone. When should you call for help? Call 911 anytime you think you may need emergency care. For example, call if: 
  · You passed out (lost consciousness).  
  · You have symptoms of a stroke. These may include: 
? Sudden numbness, tingling, weakness, or loss of movement in your face, arm, or leg, especially on only one side of your body. ? Sudden vision changes. ? Sudden trouble speaking. ? Sudden confusion or trouble understanding simple statements. ? Sudden problems with walking or balance. ? A sudden, severe headache that is different from past headaches. Call your doctor now or seek immediate medical care if: 
  · Vertigo occurs with a fever, a headache, or ringing in your ears.  
  · You have new or increased nausea and vomiting. Watch closely for changes in your health, and be sure to contact your doctor if: 
  · Vertigo gets worse or happens more often.  
  · Vertigo has not gotten better after 2 weeks. Where can you learn more? Go to http://www.gray.com/ Enter H818 in the search box to learn more about \"Vertigo: Care Instructions. \" Current as of: April 15, 2020               Content Version: 12.6 © 4475-8935 [employer]. Care instructions adapted under license by [employer] (which disclaims liability or warranty for this information). If you have questions about a medical condition or this instruction, always ask your healthcare professional. [place] [address] any warranty or liability for your use of this information.

## 2021-03-01 NOTE — PROGRESS NOTES
Results released to patient via [employer]. All labs are stable or at goal for her. Cr unchanged from last year.

## 2021-04-23 DIAGNOSIS — F33.41 RECURRENT MAJOR DEPRESSIVE DISORDER, IN PARTIAL REMISSION (HCC): ICD-10-CM

## 2021-04-23 DIAGNOSIS — F41.1 GAD (GENERALIZED ANXIETY DISORDER): ICD-10-CM

## 2021-04-23 RX ORDER — SERTRALINE HYDROCHLORIDE 100 MG/1
TABLET, FILM COATED ORAL
Qty: 135 TAB | Refills: 0 | Status: SHIPPED | OUTPATIENT
Start: 2021-04-23 | End: 2021-04-23 | Stop reason: SDUPTHER

## 2021-04-23 RX ORDER — SERTRALINE HYDROCHLORIDE 100 MG/1
TABLET, FILM COATED ORAL
Qty: 135 TAB | Refills: 0 | Status: SHIPPED | OUTPATIENT
Start: 2021-04-23 | End: 2021-07-19 | Stop reason: SDUPTHER

## 2021-04-23 NOTE — TELEPHONE ENCOUNTER
PT called us inform that she realized she does not have enough pills to make it through the weekend and requested that we fill this asap

## 2021-05-26 ENCOUNTER — TELEPHONE (OUTPATIENT)
Dept: SLEEP MEDICINE | Age: 66
End: 2021-05-26

## 2021-05-26 ENCOUNTER — DOCUMENTATION ONLY (OUTPATIENT)
Dept: SLEEP MEDICINE | Age: 66
End: 2021-05-26

## 2021-05-26 ENCOUNTER — VIRTUAL VISIT (OUTPATIENT)
Dept: SLEEP MEDICINE | Age: 66
End: 2021-05-26
Payer: COMMERCIAL

## 2021-05-26 DIAGNOSIS — G47.33 OBSTRUCTIVE SLEEP APNEA (ADULT) (PEDIATRIC): Primary | ICD-10-CM

## 2021-05-26 DIAGNOSIS — Z86.59 H/O: DEPRESSION: ICD-10-CM

## 2021-05-26 PROCEDURE — 99213 OFFICE O/P EST LOW 20 MIN: CPT | Performed by: NURSE PRACTITIONER

## 2021-05-26 NOTE — PATIENT INSTRUCTIONS
[address] [place]. [address] [facility], [address] Tel.  [phone] Fax. [address] [place], [address] Tel.  [phone] Fax. [phone] [address] Tel.  [phone] Fax. [phone] Learning About CPAP for Sleep Apnea What is CPAP? CPAP is a small machine that you use at home every night while you sleep. It increases air pressure in your throat to keep your airway open. When you have sleep apnea, this can help you sleep better so you feel much better. CPAP stands for \"continuous positive airway pressure. \" The CPAP machine will have one of the following: · A mask that covers your nose and mouth · Prongs that fit into your nose · A mask that covers your nose only, the most common type. This type is called NCPAP. The N stands for \"nasal.\" Why is it done? CPAP is usually the best treatment for obstructive sleep apnea. It is the first treatment choice and the most widely used. Your doctor may suggest CPAP if you have: · Moderate to severe sleep apnea. · Sleep apnea and coronary artery disease (CAD) or heart failure. How does it help? · CPAP can help you have more normal sleep, so you feel less sleepy and more alert during the daytime. · CPAP may help keep heart failure or other heart problems from getting worse. · NCPAP may help lower your blood pressure. · If you use CPAP, your bed partner may also sleep better because you are not snoring or restless. What are the side effects? Some people who use CPAP have: · A dry or stuffy nose and a sore throat. · Irritated skin on the face. · Sore eyes. · Bloating. If you have any of these problems, work with your doctor to fix them. Here are some things you can try: · Be sure the mask or nasal prongs fit well. · See if your doctor can adjust the pressure of your CPAP. · If your nose is dry, try a humidifier.  
· If your nose is runny or stuffy, try decongestant medicine or a steroid nasal spray. If these things do not help, you might try a different type of machine. Some machines have air pressure that adjusts on its own. Others have air pressures that are different when you breathe in than when you breathe out. This may reduce discomfort caused by too much pressure in your nose. Where can you learn more? Go to [employer]omer[employer].be Enter W938 in the search box to learn more about \"Learning About CPAP for Sleep Apnea. \"  
© 4621-8737 Healthwise, Incorporated. Care instructions adapted under license by [employer] (which disclaims liability or warranty for this information). This care instruction is for use with your licensed healthcare professional. If you have questions about a medical condition or this instruction, always ask your healthcare professional. [address] any warranty or liability for your use of this information. Content Version: 8.9.47879; Last Revised: January 11, 2010 PROPER SLEEP HYGIENE What to avoid · Do not have drinks with caffeine, such as coffee or black tea, for 8 hours before bed. · Do not smoke or use other types of tobacco near bedtime. Nicotine is a stimulant and can keep you awake. · Avoid drinking alcohol late in the evening, because it can cause you to wake in the middle of the night. · Do not eat a big meal close to bedtime. If you are hungry, eat a light snack. · Do not drink a lot of water close to bedtime, because the need to urinate may wake you up during the night. · Do not read or watch TV in bed. Use the bed only for sleeping and sexual activity. What to try · Go to bed at the same time every night, and wake up at the same time every morning. Do not take naps during the day. · Keep your bedroom quiet, dark, and cool. · Get regular exercise, but not within 3 to 4 hours of your bedtime. [place]field [place]ard · Sleep on a comfortable pillow and mattress.  
· If watching the clock makes you anxious, turn it facing away from you so you cannot see the time. · If you worry when you lie down, start a worry book. Well before bedtime, write down your worries, and then set the book and your concerns aside. · Try meditation or other relaxation techniques before you go to bed. · If you cannot fall asleep, get up and go to another room until you feel sleepy. Do something relaxing. Repeat your bedtime routine before you go to bed again. · Make your house quiet and calm about an hour before bedtime. Turn down the lights, turn off the TV, log off the computer, and turn down the volume on music. This can help you relax after a busy day. Drowsy Driving: The [employer] cites drowsiness as a causing factor in more than 113,655 police reported crashes annually, resulting in 76,000 injuries and 1,500 deaths. Other surveys suggest 55% of people polled have driven while drowsy in the past year, 23% had fallen asleep but not crashed, 3% crashed, and 2% had and accident due to drowsy driving. Who is at risk? Young Drivers: One study of drowsy driving accidents states that 55% of the drivers were under 25 years. Of those, 75% were male. Shift Workers and Travelers: People who work overnight or travel across time zones frequently are at higher risk of experiencing Circadian Rhythm Disorders. They are trying to work and function when their body is programed to sleep. Sleep Deprived: Lack of sleep has a serious impact on your ability to pay attention or focus on a task. Consistently getting less than the average of 8 hours your body needs creates partial or cumulative sleep deprivation. Untreated Sleep Disorders: Sleep Apnea, Narcolepsy, R.L.S., and other sleep disorders (untreated) prevent a person from getting enough restful sleep. This leads to excessive daytime sleepiness and increases the risk for drowsy driving accidents by up to 7 times.  
Medications / Alcohol: Even over the counter medications can cause drowsiness. Medications that impair a drivers attention should have a warning label. Alcohol naturally makes you sleepy and on its own can cause accidents. Combined with excessive drowsiness its effects are amplified. Signs of Drowsy Driving: * You don't remember driving the last few miles * You may drift out of your leslie * You are unable to focus and your thoughts wander * You may yawn more often than normal 
 * You have difficulty keeping your eyes open / nodding off * Missing traffic signs, speeding, or tailgating Prevention-  
Good sleep hygiene, lifestyle and behavioral choices have the most impact on drowsy driving. There is no substitute for sleep and the average person requires 8 hours nightly. If you find yourself driving drowsy, stop and sleep. Consider the sleep hygiene tips provided during your visit as well. Medication Refill Policy: Refills for all medications require 1 week advance notice. Please have your pharmacy fax a refill request. We are unable to fax, or call in \"controled substance\" medications and you will need to pick these prescriptions up from our office. [employer] Activation Thank you for requesting access to [employer]. Please follow the instructions below to securely access and download your online medical record. [employer] allows you to send messages to your doctor, view your test results, renew your prescriptions, schedule appointments, and more. How Do I Sign Up? 1. In your internet browser, go to [email or web address]. 2. Click on the First Time User? Click Here link in the Sign In box. You will see the New Member Sign Up page. 3. Enter your [employer] Access Code exactly as it appears below. You will not need to use this code after youve completed the sign-up process. If you do not sign up before the expiration date, you must request a new code. [employer] Access Code:  Activation code not generated Current [employer] Status: Active (This is the date your [employer] access code will ) 4. Enter the last four digits of your Social Security Number (xxxx) and Date of Birth (mm/dd/yyyy) as indicated and click Submit. You will be taken to the next sign-up page. 5. Create a Versa Networkst ID. This will be your [employer] login ID and cannot be changed, so think of one that is secure and easy to remember. 6. Create a [employer] password. You can change your password at any time. 7. Enter your Password Reset Question and Answer. This can be used at a later time if you forget your password. 8. Enter your e-mail address. You will receive e-mail notification when new information is available in [address] 9. Click Sign Up. You can now view and download portions of your medical record. 10. Click the Download Summary menu link to download a portable copy of your medical information. Additional Information If you have questions, please call [phone]. Remember, [employer] is NOT to be used for urgent needs. For medical emergencies, dial 911.

## 2021-05-26 NOTE — PROGRESS NOTES
[address] [facility], [place] [place]. [place], [address]   Tel.  [phone]   Fax. [address] [facility] [address]   [place], [address] [facility]   Tel.  [phone]   Fax. [phone] [address]   Tel.  [phone]   Fax. [phone]     Trudy Cabello (: 1955) is a 76 y.o. female, established patient, seen for positive airway pressure follow-up, she was last seen by me on 2020, previously seen by Dr. Rolly Hatch 2019, prior notes reviewed in detail.  Home sleep test 2018 showed AHI of 14.2/hr with a lowest SaO2 of 80%. device set up 3/5/2020. ASSESSMENT/PLAN:    ICD-10-CM ICD-9-CM    1. Obstructive sleep apnea (adult) (pediatric)  G47.33 327.23 AMB SUPPLY ORDER   2. H/O: depression  Z86.59 V11.8    3. Adult BMI 30.0-30.9 kg/sq m  Z68.30 V85.30        AHI = 14.2(2018). On Respironics APAP :  7-12 cmH2O. Set up 3/5/2020. She is adherent with PAP therapy and PAP continues to benefit patient and remains necessary for control of her sleep apnea. Follow-up and Dispositions    · Return in about 1 year (around 2022). 1. Sleep Apnea -   Continue on current pressures    * Supplies ordered - nasal mask and heated tubing    Orders Placed This Encounter    AMB SUPPLY ORDER     Diagnosis: (G47.33) ASHER (obstructive sleep apnea)  (primary encounter diagnosis)     Replacement Supplies for Positive Airway Pressure Therapy Device:   Duration of need: 99 months.  Nasal Cushion (Replace) 2 per month.  Nasal Interface Mask 1 every 3 months.  Pos Airway pressure chin strap   Headgear 1 every 6 months.  Tubing with heating element 1 every 3 months.  Filter(s) Disposable 2 per month.  Filter(s) Non-Disposable 1 every 6 months. .   [address] [facility] for Humidifier (Replace) 1 every 6 months. Maddie Liter, AGPCNP-BC; NPI: [id number]    Electronically signed.  Date:- 21     * Re-enforced proper and regular cleaning for the device. * She was asked to contact our office for any problems regarding PAP therapy. 2. H/O Depression - continue on her current regimen. 3. Encouraged continued weight management program through appropriate diet and exercise regimen as significant weight reduction has been shown to reduce severity of obstructive sleep apnea. SUBJECTIVE/OBJECTIVE:    She  is seen today for follow up on PAP device and reports no problems using the device. The following concerns reviewed:    Drowsiness no Problems exhaling no   Snoring no Forget to put on no   Mask Comfortable yes Can't fall asleep no   Dry Mouth no Mask falls off ye   Air Leaking no Frequent awakenings no       She admits that her sleep has improved on PAP therapy using nasal mask and heated tubing. Pressure changed to APAP 7-12 cm H2O per review with patient after last visit, she reports this is working well. She notes the air seems too hot, we turned tube temp off. Discussed concerns over memory and affects of stress. Review of device download indicated:  Auto pressure: 7-12 cmH2O; Peak Avg Pressure: 10.2 cmH2O;  Avg. Device Pressure <= 90 %: 9.0 cmH2O    Average % Night in Large Leak:  0.5  % Used Days >= 4 hours: 80. Avg hours used:  5. Therapy Apnea Index averaged over PAP use: 4.8 /hr which reflects improved sleep breathing condition. Hamburg Sleepiness Score: 11 and Modified F.O.S.Q. Score Total / 2: 14 which reflects improved sleep quality over therapy time. Sleep Review of Systems: notable for Negative difficulty falling asleep; Positive awakenings at night; Negative early morning headaches; Occasional memory problems with fatigue; Negative concentration issues;  Negative chest pain; Negative shortness of breath; Negative significant joint pain at night; Negative significant muscle pain at night; Negative rashes or itching; Negative heartburn; Negative significant mood issues; daily brief afternoon naps    Vitals reported by patient   Patient-Reported Vitals 5/26/2021   Patient-Reported Weight 190 lb      Calculated BMI 30    Physical Exam completed by visual and auditory observation of patient with verbal input from patient. General:   Alert, oriented, not in acute distress   Eyes:  Anicteric Sclerae; no obvious strabismus   Nose:  No obvious nasal septum deviation    Neck:   Midline trachea, no visible mass   Chest/Lungs:  Respiratory effort normal, no visualized signs of difficulty breathing or respiratory distress   CVS:  No JVD   Extremities:  No obvious rashes noted on face, neck, or hands   Neuro:  No facial asymmetry, no focal deficits; no obvious tremor    Psych:  Normal affect,  normal countenance     Sreekanth Bolton is being evaluated by a Virtual Visit (video visit) encounter to address concerns as mentioned above. A caregiver was present when appropriate. Due to this being a TeleHealth encounter (During [place] [address] public health emergency), evaluation of the following organ systems was limited: Vitals/Constitutional/EENT/Resp/CV/GI//MS/Neuro/Skin/Heme-Lymph-Imm. Pursuant to the emergency declaration under the [address] [place] [address], [address] [place] [address] authority and the [employer] Act, this Virtual Visit was conducted with patient's (and/or legal guardian's) consent, to reduce the patient's risk of exposure to COVID-19 and provide necessary medical care. Patient identification was verified at the start of the visit: YES using photo in chart. Patient's phone number [phone] (cell) was confirmed for accuracy. She gives permission for messages regarding results and appointments to be left at that number. Services were provided through a video synchronous discussion virtually to substitute for in-person clinic visit.   I was in the office while conducting this encounter, patient located at their home or alternate location of their choice. An electronic signature was used to authenticate this note.     -- Dustin Kaur NP, [facility]  05/26/21

## 2021-06-18 DIAGNOSIS — F41.8 DEPRESSION WITH ANXIETY: ICD-10-CM

## 2021-06-18 DIAGNOSIS — F41.1 GAD (GENERALIZED ANXIETY DISORDER): ICD-10-CM

## 2021-06-18 DIAGNOSIS — F17.200 TOBACCO DEPENDENCE: ICD-10-CM

## 2021-06-18 RX ORDER — BUPROPION HYDROCHLORIDE 300 MG/1
TABLET ORAL
Qty: 90 TABLET | Refills: 1 | Status: SHIPPED | OUTPATIENT
Start: 2021-06-18 | End: 2021-09-24 | Stop reason: SDUPTHER

## 2021-06-25 ENCOUNTER — OFFICE VISIT (OUTPATIENT)
Dept: CARDIOLOGY CLINIC | Age: 66
End: 2021-06-25
Payer: COMMERCIAL

## 2021-06-25 ENCOUNTER — TELEPHONE (OUTPATIENT)
Dept: CARDIOLOGY CLINIC | Age: 66
End: 2021-06-25

## 2021-06-25 VITALS
DIASTOLIC BLOOD PRESSURE: 90 MMHG | RESPIRATION RATE: 20 BRPM | OXYGEN SATURATION: 98 % | BODY MASS INDEX: 30.7 KG/M2 | HEART RATE: 90 BPM | HEIGHT: 66 IN | WEIGHT: 191 LBS | SYSTOLIC BLOOD PRESSURE: 140 MMHG

## 2021-06-25 DIAGNOSIS — R06.02 SOB (SHORTNESS OF BREATH): Primary | ICD-10-CM

## 2021-06-25 DIAGNOSIS — E78.00 HYPERCHOLESTEREMIA: ICD-10-CM

## 2021-06-25 DIAGNOSIS — R00.2 PALPITATIONS: ICD-10-CM

## 2021-06-25 DIAGNOSIS — G47.33 OBSTRUCTIVE SLEEP APNEA SYNDROME: ICD-10-CM

## 2021-06-25 PROCEDURE — 99214 OFFICE O/P EST MOD 30 MIN: CPT | Performed by: SPECIALIST

## 2021-06-25 RX ORDER — AMMONIUM LACTATE 12 G/100G
CREAM TOPICAL
COMMUNITY
Start: 2021-04-23

## 2021-06-25 RX ORDER — HYDROCODONE BITARTRATE AND ACETAMINOPHEN 5; 325 MG/1; MG/1
TABLET ORAL AS NEEDED
COMMUNITY
Start: 2021-04-19 | End: 2021-08-09

## 2021-06-25 RX ORDER — SPIRONOLACTONE 50 MG/1
50 TABLET, FILM COATED ORAL
COMMUNITY
Start: 2021-06-18

## 2021-06-25 NOTE — TELEPHONE ENCOUNTER
6/25/2021 at 2:01 left message call to schedule stress echo for SOB per Dr. Debi Rossi - please send call to me

## 2021-06-25 NOTE — PROGRESS NOTES
HISTORY OF PRESENT ILLNESS  Tracy Sheldon is a 76 y.o. female     SUMMARY:   Problem List  Date Reviewed: 6/25/2021        Codes Class Noted    Recurrent major depressive disorder ([facility] [address] ICD-10-CM: F33.9  ICD-9-CM: 296.30  12/31/2019        Neurofibroma determined by biopsy of tongue ICD-10-CM: D36.10  ICD-9-CM: 215.9  6/14/2016    Overview Signed 6/14/2016 12:23 PM by Nicole Marshall MD     ENT Dr. Valentine Brasher - 11/2015              Prediabetes ICD-10-CM: R73.03  ICD-9-CM: 790.29  6/1/2016    Overview Signed 8/22/2016 11:18 AM by Namrata JOSE     A1C 6.1             Hypercholesteremia ICD-10-CM: E78.00  ICD-9-CM: 272.0  6/19/2014        ENIO (generalized anxiety disorder) ICD-10-CM: F41.1  ICD-9-CM: 300.02  4/14/2014    Overview Signed 1/26/2015  9:31 AM by Nicole Marshall MD     Prior therapy:  Effexor. Lexapro. Hypovitaminosis D ICD-10-CM: E55.9  ICD-9-CM: 268.9  4/14/2014        Tobacco dependence ICD-10-CM: F17.200  ICD-9-CM: 305.1  12/2/2013    Overview Signed 10/22/2015  9:04 AM by Nicole Marshall MD     [facility] Oct 2015             Osteopenia of multiple sites ICD-10-CM: M85.89  ICD-9-CM: 733.90  12/2/2013    Overview Signed 12/2/2013 11:59 AM by Nicole Marshall MD     12/2012 - [facility]             Post-nasal drip ICD-10-CM: R09.82  ICD-9-CM: 784.91  12/2/2013              Current Outpatient Medications on File Prior to Visit   Medication Sig    ammonium lactate (AMLACTIN) 12 % topical cream     HYDROcodone-acetaminophen (NORCO) 5-325 mg per tablet as needed.  buPROPion XL (WELLBUTRIN XL) 300 mg XL tablet TAKE ONE TABLET DAILY    sertraline (ZOLOFT) 100 mg tablet TAKE 1 & 1/2 TABLETS DAILY    rosuvastatin (CRESTOR) 10 mg tablet TAKE 1 TABLET AT NIGHT    CALCIUM PO Take  by mouth daily.  alendronate (FOSAMAX) 35 mg tablet Take 1 Tab by mouth every seven (7) days.  naproxen sodium (ALEVE PO) Take  by mouth as needed.     ibuprofen (MOTRIN) 200 mg tablet Take 400 mg by mouth every six (6) hours as needed for Pain.  FEXOFENADINE HCL (ALLEGRA PO) Take 1 Tab by mouth daily as needed.  cinnamon bark (CINNAMON) 500 mg cap Take 2 Tablets by mouth daily.  cholecalciferol (VITAMIN D3) 1,000 unit tablet Take 2,000 Units by mouth two (2) times a day.  spironolactone (ALDACTONE) 50 mg tablet      No current facility-administered medications on file prior to visit. CARDIOLOGY STUDIES TO DATE:  3/18 normal echo    Chief Complaint   Patient presents with    Follow-up     HPI :  She quit smoking in January and really it was not particularly difficult for her. For the last couple months she has noticed intermittent cough as well as some shortness of breath particularly if she goes upstairs. She also has some some mild dependent edema which is nothing new and is almost certainly due to venous disease. Her primary care is following her lipids. CARDIAC ROS:   negative for chest pain, palpitations, syncope, orthopnea, paroxysmal nocturnal dyspnea, exertional chest pressure/discomfort, claudication    Family History   Problem Relation Age of Onset    Coronary Artery Disease Father         75, first episode late 63's    Alzheimer Father     Heart Disease Father         Artery disease, Alzhimers    Pacemaker Brother     Thyroid Disease Brother     Arrhythmia Mother         Afib     Breast Cancer Mother 52    Thyroid Disease Mother     Cancer Mother         Breast Cancer    Heart Disease Mother         Tachycardia, leaky vlave    Stroke Sister     Breast Cancer Maternal Grandmother         Great grandmother/age unknown    No Known Problems Son     No Known Problems Son     Anesth Problems Neg Hx        Past Medical History:   Diagnosis Date    Anxiety disorder     Carpal tunnel syndrome of left wrist     DDD (degenerative disc disease), thoracic 10/13/14    Depression     Hypercholesterolemia     Menopause     LMP-1996?     Neurofibroma determined by biopsy of tongue     Dr. Ayden Aldrich    Prediabetes 06/2016    A1C 6.1    Scoliosis 10/13/14    thoracic spine, mild       GENERAL ROS:  A comprehensive review of systems was negative except for that written in the HPI.     Visit Vitals  BP (!) 140/90 (BP 1 Location: Right arm, BP Patient Position: Sitting, BP Cuff Size: Adult)   Pulse 90   Resp 20   Ht 5' 6\" (1.676 m)   Wt 191 lb (86.6 kg)   SpO2 98%   BMI 30.83 kg/m²       Wt Readings from Last 3 Encounters:   06/25/21 191 lb (86.6 kg)   02/26/21 196 lb (88.9 kg)   09/30/20 191 lb (86.6 kg)            BP Readings from Last 3 Encounters:   06/25/21 (!) 140/90   02/26/21 132/80   09/30/20 124/88       PHYSICAL EXAM  General appearance: alert, cooperative, no distress, appears stated age  Neurologic: Alert and oriented X 3  Neck: supple, symmetrical, trachea midline, no adenopathy, no carotid bruit and no JVD  Lungs: clear to auscultation bilaterally  Heart: regular rate and rhythm, S1, S2 normal, no murmur, click, rub or gallop  Extremities: extremities normal, atraumatic, no cyanosis or edema, varicose veins noted  Pulses: 2+ and symmetric    Lab Results   Component Value Date/Time    Cholesterol, total 162 09/28/2020 01:25 PM    Cholesterol, total 128 05/06/2019 08:32 AM    Cholesterol, total 222 (H) 02/26/2018 08:30 AM    Cholesterol, total 149 04/20/2017 09:15 AM    Cholesterol, total 216 (H) 06/07/2016 07:39 AM    HDL Cholesterol 67 09/28/2020 01:25 PM    HDL Cholesterol 56 05/06/2019 08:32 AM    HDL Cholesterol 52 02/26/2018 08:30 AM    HDL Cholesterol 56 04/20/2017 09:15 AM    HDL Cholesterol 53 06/07/2016 07:39 AM    LDL, calculated 74 09/28/2020 01:25 PM    LDL, calculated 58 05/06/2019 08:32 AM    LDL, calculated 146 (H) 02/26/2018 08:30 AM    LDL, calculated 74 04/20/2017 09:15 AM    LDL, calculated 137 (H) 06/07/2016 07:39 AM    LDL, calculated 127 (H) 07/25/2014 09:24 AM    Triglyceride 121 09/28/2020 01:25 PM    Triglyceride 69 05/06/2019 08:32 AM    Triglyceride 119 02/26/2018 08:30 AM    Triglyceride 93 04/20/2017 09:15 AM    Triglyceride 130 06/07/2016 07:39 AM     ASSESSMENT :      She no longer has problems with palpitations but the shortness of breath is definitely something new. I think she needs a stress echo to further risk stratify her and if that looks good I will recommend she see pulmonary. She may have some COPD from her long smoking history. current treatment plan is effective, no change in therapy  lab results and schedule of future lab studies reviewed with patient  reviewed diet, exercise and weight control    Encounter Diagnoses   Name Primary?  SOB (shortness of breath) Yes    Hypercholesteremia     Suspected sleep apnea     Palpitations      Orders Placed This Encounter    ammonium lactate (AMLACTIN) 12 % topical cream    HYDROcodone-acetaminophen (NORCO) 5-325 mg per tablet    spironolactone (ALDACTONE) 50 mg tablet       Follow-up and Dispositions    · Return in about 6 months (around 12/25/2021). Mahad Marte MD  6/25/2021  Please note that this dictation was completed with [employer], the computer voice recognition software. Quite often unanticipated grammatical, syntax, homophones, and other interpretive errors are inadvertently transcribed by the computer software. Please disregard these errors. Please excuse any errors that have escaped final proofreading. Thank you.

## 2021-06-28 NOTE — TELEPHONE ENCOUNTER
6/28/2021 communications with patient via My Chart - stress echo scheduled as follows     Future Appointments   Date Time Provider Luisito [place]   7/1/2021 12:00 PM ROBY VIVEROS BS AMB   8/27/2021  8:30 AM Nikki Mendoza MD [facility] BS AMB

## 2021-06-30 ENCOUNTER — TELEPHONE (OUTPATIENT)
Dept: CARDIOLOGY CLINIC | Age: 66
End: 2021-06-30

## 2021-06-30 NOTE — TELEPHONE ENCOUNTER
Spoke with patient and asked her to come in at 11:15 for stress echo at 1130. Pt verbalized understanding.

## 2021-07-01 ENCOUNTER — TELEPHONE (OUTPATIENT)
Dept: CARDIOLOGY CLINIC | Age: 66
End: 2021-07-01

## 2021-07-01 ENCOUNTER — ANCILLARY PROCEDURE (OUTPATIENT)
Dept: CARDIOLOGY CLINIC | Age: 66
End: 2021-07-01
Payer: COMMERCIAL

## 2021-07-01 VITALS
DIASTOLIC BLOOD PRESSURE: 82 MMHG | WEIGHT: 191 LBS | BODY MASS INDEX: 30.7 KG/M2 | HEIGHT: 66 IN | SYSTOLIC BLOOD PRESSURE: 120 MMHG

## 2021-07-01 LAB
ECHO TV REGURGITANT MAX VELOCITY: 220.78 CM/S
ECHO TV REGURGITANT PEAK GRADIENT: 19.5 MMHG
STRESS ANGINA INDEX: 0
STRESS BASELINE DIAS BP: 82 MMHG
STRESS BASELINE HR: 75 BPM
STRESS BASELINE SYS BP: 120 MMHG
STRESS ESTIMATED WORKLOAD: 7 METS
STRESS EXERCISE DUR MIN: NORMAL
STRESS PEAK DIAS BP: 80 MMHG
STRESS PEAK SYS BP: 160 MMHG
STRESS PERCENT HR ACHIEVED: 94 %
STRESS POST PEAK HR: 144 BPM
STRESS RATE PRESSURE PRODUCT: NORMAL BPM*MMHG
STRESS ST DEPRESSION: 0.5 MM
STRESS TARGET HR: 154 BPM

## 2021-07-01 PROCEDURE — 93351 STRESS TTE COMPLETE: CPT | Performed by: SPECIALIST

## 2021-07-01 NOTE — PROGRESS NOTES
Normal. No evidence of any blockages as cause for symptoms. Would see what pcp thinks, or we can refer for pulmonary evaluation.  Dr Kaur Iglesias. thanks

## 2021-07-01 NOTE — TELEPHONE ENCOUNTER
----- Message from [address] [facility] [address], [place] sent at 7/1/2021  1:16 PM EDT -----  Normal. No evidence of any blockages as cause for symptoms. Would see what pcp thinks, or we can refer for pulmonary evaluation.  Dr Bowen Angulo. thanks

## 2021-07-01 NOTE — TELEPHONE ENCOUNTER
Called patient. Verified patient's identity with two identifiers. Notified patient of results and Dr. Gonzalo Edward message. Patient verbalized understanding and denied further questions or concerns.

## 2021-07-19 ENCOUNTER — TRANSCRIBE ORDER (OUTPATIENT)
Dept: GENERAL RADIOLOGY | Age: 66
End: 2021-07-19

## 2021-07-19 ENCOUNTER — HOSPITAL ENCOUNTER (OUTPATIENT)
Dept: MAMMOGRAPHY | Age: 66
Discharge: HOME | End: 2021-07-19
Payer: COMMERCIAL

## 2021-07-19 DIAGNOSIS — Z12.31 VISIT FOR SCREENING MAMMOGRAM: Primary | ICD-10-CM

## 2021-07-19 DIAGNOSIS — Z12.31 VISIT FOR SCREENING MAMMOGRAM: ICD-10-CM

## 2021-07-19 DIAGNOSIS — F41.1 GAD (GENERALIZED ANXIETY DISORDER): ICD-10-CM

## 2021-07-19 DIAGNOSIS — F33.41 RECURRENT MAJOR DEPRESSIVE DISORDER, IN PARTIAL REMISSION (HCC): ICD-10-CM

## 2021-07-19 PROCEDURE — 77063 BREAST TOMOSYNTHESIS BI: CPT

## 2021-07-19 RX ORDER — SERTRALINE HYDROCHLORIDE 100 MG/1
TABLET, FILM COATED ORAL
Qty: 135 TABLET | Refills: 1 | Status: SHIPPED | OUTPATIENT
Start: 2021-07-19 | End: 2021-12-29 | Stop reason: SDUPTHER

## 2021-07-19 NOTE — TELEPHONE ENCOUNTER
Future Appointments   Date Time Provider Luisito [place]   8/27/2021  8:30 AM Brenda Mckeon MD [facility] NINO AMB

## 2021-08-04 DIAGNOSIS — G62.9 NEUROPATHY: Primary | ICD-10-CM

## 2021-08-05 NOTE — PROGRESS NOTES
Future Appointments   Date Time Provider Luisito Itzel   8/9/2021  3:15 PM Kylah Palza MD [facility] BS AMB   8/27/2021  8:30 AM MD HARMONY Ruff BS AMB   9/15/2021  9:00 AM [facility]ider [facility]

## 2021-08-09 ENCOUNTER — OFFICE VISIT (OUTPATIENT)
Dept: INTERNAL MEDICINE CLINIC | Age: 66
End: 2021-08-09
Payer: COMMERCIAL

## 2021-08-09 VITALS
HEART RATE: 84 BPM | RESPIRATION RATE: 16 BRPM | DIASTOLIC BLOOD PRESSURE: 75 MMHG | HEIGHT: 66 IN | BODY MASS INDEX: 29.89 KG/M2 | WEIGHT: 186 LBS | OXYGEN SATURATION: 98 % | SYSTOLIC BLOOD PRESSURE: 117 MMHG | TEMPERATURE: 97.8 F

## 2021-08-09 DIAGNOSIS — G57.92 NEUROPATHY OF LEFT LOWER EXTREMITY: Primary | ICD-10-CM

## 2021-08-09 PROCEDURE — 99214 OFFICE O/P EST MOD 30 MIN: CPT | Performed by: INTERNAL MEDICINE

## 2021-08-09 RX ORDER — ALENDRONATE SODIUM 35 MG/1
35 TABLET ORAL
Qty: 12 TABLET | Refills: 1 | Status: SHIPPED | OUTPATIENT
Start: 2021-08-09 | End: 2021-09-24 | Stop reason: SDUPTHER

## 2021-08-09 NOTE — PROGRESS NOTES
Peyton Hammer is a 76 y.o. female who was seen in clinic today (8/9/2021) for an acute visit. Assessment & Plan:   Below is the assessment and plan developed based on review of pertinent history, physical exam, labs, studies, and medications. 1. Neuropathy of left lower extremity  -     REFERRAL TO PHYSICAL THERAPY    This is a new problem, symptoms are stable and have not improved, differential dx reviewed with the patient, sounds like it involves L4/5 dermatome. Labs ordered and completed this morning, still pending. Reviewed imaging but will defer for now. Will start with PT. Agree w/ seeing neurology next month. No obvious medication etiology, so no med changes. Reviewed Lyrica and/or Neurontin but will defer. Red flags were reviewed with the patient to RTC or notify me, expected time course for resolution reviewed. Follow-up and Dispositions    · Return if symptoms worsen or fail to improve. Subjective/Objective:   Argenis Ham was seen today for Tingling    Neurological Review  She is here today to talk about sensory changes on her left leg. Symptoms have worsened, and have been present for 3-4 months. She reports getting into a pool and had decreased sensation to cold. This on the whole outside of the L leg. She will have on/off tingling/burning in the left foot. Associated symptoms: weight loss and sweating. She is also complaining of inflamed left ankle bursa. Only issue on the right leg is on/off slightly 1st toe tingling. Medical treatment has included: she has tried Ibuprofen. No h/o trauma. She is concerned about diabetes. Labs were done this morning but are not back at this time. Prior to Admission medications    Medication Sig Start Date End Date Taking?  Authorizing Provider   sertraline (ZOLOFT) 100 mg tablet TAKE 1 & 1/2 TABLETS DAILY 7/19/21  Yes Linus Gupta MD   ammonium lactate (AMLACTIN) 12 % topical cream  4/23/21  Yes Provider, Historical spironolactone (ALDACTONE) 50 mg tablet daily. 6/18/21  Yes Provider, Historical   buPROPion XL (WELLBUTRIN XL) 300 mg XL tablet TAKE ONE TABLET DAILY 6/18/21  Yes Clover Luu MD   rosuvastatin (CRESTOR) 10 mg tablet TAKE 1 TABLET AT NIGHT 11/1/20  Yes Clover Luu MD   CALCIUM PO Take  by mouth daily. Yes Provider, Historical   alendronate (FOSAMAX) 35 mg tablet Take 1 Tab by mouth every seven (7) days. 9/30/20  Yes Clover Luu MD   naproxen sodium (ALEVE PO) Take  by mouth as needed. Yes Provider, Historical   ibuprofen (MOTRIN) 200 mg tablet Take 400 mg by mouth every six (6) hours as needed for Pain. Yes Provider, Historical   FEXOFENADINE HCL (ALLEGRA PO) Take 1 Tab by mouth daily as needed. Yes Provider, Historical   cinnamon bark (CINNAMON) 500 mg cap Take 2 Tablets by mouth daily. Yes Provider, Historical   cholecalciferol (VITAMIN D3) 1,000 unit tablet Take 2,000 Units by mouth two (2) times a day. Yes Provider, Historical   HYDROcodone-acetaminophen (NORCO) 5-325 mg per tablet as needed. Patient not taking: Reported on 8/9/2021 4/19/21 8/9/21  Provider, Historical           Physical Exam  Cardiovascular:      Rate and Rhythm: Regular rhythm. Heart sounds: Normal heart sounds. No murmur heard. Pulmonary:      Effort: Pulmonary effort is normal.      Breath sounds: Normal breath sounds. No wheezing or rales. Musculoskeletal:      Lumbar back: No deformity, spasms, tenderness or bony tenderness. Left ankle: Normal.   Neurological:      Comments: Straight leg raise: negative  Strength is: 5/5 in lower extremities.     Sensation is intact to light touch in lower extremities            Visit Vitals  /75   Pulse 84   Temp 97.8 °F (36.6 °C) (Temporal)   Resp 16   Ht 5' 6\" (1.676 m)   Wt 186 lb (84.4 kg)   SpO2 98%   BMI 30.02 kg/m²         Advised her to call back or return to office if symptoms worsen/change/persist.  Discussed expected course/resolution/complications of diagnosis in detail with patient. Medication risks/benefits/costs/interactions/alternatives discussed with patient.     Ken Motley MD

## 2021-08-09 NOTE — PATIENT INSTRUCTIONS
PHYSICAL THERAPY    [facility]    [address] [place], [address]   [phone]    [address] [facility]   [facility] [address] [facility] [address]   [phone]    [facility] at [facility], [address]   [phone]      [facility] ([facility]).    Multiple locations    [facility]   [address] [place] [address], [address]   [phone]    [place] for [address] [facility]   [phone]   [address]   [phone]    [facility]   [address]   [phone]    [address] ([place])   [phone]    [facility]   Multiple Locations    [phone]

## 2021-08-10 LAB
25(OH)D3+25(OH)D2 SERPL-MCNC: 29.7 NG/ML (ref 30–100)
ALBUMIN SERPL-MCNC: 4.3 G/DL (ref 3.8–4.8)
ALBUMIN/GLOB SERPL: 2.2 {RATIO} (ref 1.2–2.2)
ALP SERPL-CCNC: 97 IU/L (ref 48–121)
ALT SERPL-CCNC: 16 IU/L (ref 0–32)
AST SERPL-CCNC: 17 IU/L (ref 0–40)
BASOPHILS # BLD AUTO: 0 X10E3/UL (ref 0–0.2)
BASOPHILS NFR BLD AUTO: 0 %
BILIRUB SERPL-MCNC: 0.3 MG/DL (ref 0–1.2)
BUN SERPL-MCNC: 22 MG/DL (ref 8–27)
BUN/CREAT SERPL: 17 (ref 12–28)
CALCIUM SERPL-MCNC: 9.4 MG/DL (ref 8.7–10.3)
CHLORIDE SERPL-SCNC: 106 MMOL/L (ref 96–106)
CO2 SERPL-SCNC: 22 MMOL/L (ref 20–29)
CREAT SERPL-MCNC: 1.32 MG/DL (ref 0.57–1)
EOSINOPHIL # BLD AUTO: 0.3 X10E3/UL (ref 0–0.4)
EOSINOPHIL NFR BLD AUTO: 4 %
ERYTHROCYTE [DISTWIDTH] IN BLOOD BY AUTOMATED COUNT: 13.8 % (ref 11.7–15.4)
EST. AVERAGE GLUCOSE BLD GHB EST-MCNC: 128 MG/DL
GLOBULIN SER CALC-MCNC: 2 G/DL (ref 1.5–4.5)
GLUCOSE SERPL-MCNC: 105 MG/DL (ref 65–99)
HBA1C MFR BLD: 6.1 % (ref 4.8–5.6)
HCT VFR BLD AUTO: 41.2 % (ref 34–46.6)
HGB BLD-MCNC: 13.5 G/DL (ref 11.1–15.9)
IMM GRANULOCYTES # BLD AUTO: 0 X10E3/UL (ref 0–0.1)
IMM GRANULOCYTES NFR BLD AUTO: 1 %
LYMPHOCYTES # BLD AUTO: 0.6 X10E3/UL (ref 0.7–3.1)
LYMPHOCYTES NFR BLD AUTO: 9 %
MCH RBC QN AUTO: 29.2 PG (ref 26.6–33)
MCHC RBC AUTO-ENTMCNC: 32.8 G/DL (ref 31.5–35.7)
MCV RBC AUTO: 89 FL (ref 79–97)
MONOCYTES # BLD AUTO: 0.5 X10E3/UL (ref 0.1–0.9)
MONOCYTES NFR BLD AUTO: 7 %
NEUTROPHILS # BLD AUTO: 5.4 X10E3/UL (ref 1.4–7)
NEUTROPHILS NFR BLD AUTO: 79 %
PLATELET # BLD AUTO: 185 X10E3/UL (ref 150–450)
POTASSIUM SERPL-SCNC: 4.8 MMOL/L (ref 3.5–5.2)
PROT SERPL-MCNC: 6.3 G/DL (ref 6–8.5)
RBC # BLD AUTO: 4.63 X10E6/UL (ref 3.77–5.28)
SODIUM SERPL-SCNC: 141 MMOL/L (ref 134–144)
TSH SERPL DL<=0.005 MIU/L-ACNC: 5.59 UIU/ML (ref 0.45–4.5)
VIT B12 SERPL-MCNC: 216 PG/ML (ref 232–1245)
WBC # BLD AUTO: 6.8 X10E3/UL (ref 3.4–10.8)

## 2021-08-10 NOTE — PROGRESS NOTES
Results released to patient via [employer]. All labs are stable or at goal for her, except for low B12, low vitamin D, slight increase in Cr, just barely elevated TSH (has run high normal for years). Will add in B12 supplement, will increase vitamin D. Will repeat BMP in 2 wks.

## 2021-08-26 ENCOUNTER — APPOINTMENT (OUTPATIENT)
Dept: PHYSICAL THERAPY | Age: 66
End: 2021-08-26

## 2021-08-27 ENCOUNTER — OFFICE VISIT (OUTPATIENT)
Dept: INTERNAL MEDICINE CLINIC | Age: 66
End: 2021-08-27
Payer: COMMERCIAL

## 2021-08-27 VITALS
HEIGHT: 66 IN | WEIGHT: 188 LBS | SYSTOLIC BLOOD PRESSURE: 96 MMHG | DIASTOLIC BLOOD PRESSURE: 63 MMHG | OXYGEN SATURATION: 96 % | TEMPERATURE: 97.3 F | RESPIRATION RATE: 14 BRPM | BODY MASS INDEX: 30.22 KG/M2 | HEART RATE: 73 BPM

## 2021-08-27 DIAGNOSIS — Z00.00 ROUTINE PHYSICAL EXAMINATION: Primary | ICD-10-CM

## 2021-08-27 DIAGNOSIS — R09.82 POST-NASAL DRIP: ICD-10-CM

## 2021-08-27 DIAGNOSIS — E66.9 OBESITY (BMI 30.0-34.9): ICD-10-CM

## 2021-08-27 DIAGNOSIS — Z71.89 ADVANCED CARE PLANNING/COUNSELING DISCUSSION: ICD-10-CM

## 2021-08-27 DIAGNOSIS — G57.92 NEUROPATHY OF LEFT LOWER EXTREMITY: ICD-10-CM

## 2021-08-27 DIAGNOSIS — Z23 ENCOUNTER FOR IMMUNIZATION: ICD-10-CM

## 2021-08-27 DIAGNOSIS — E03.8 SUBCLINICAL HYPOTHYROIDISM: ICD-10-CM

## 2021-08-27 DIAGNOSIS — F33.41 RECURRENT MAJOR DEPRESSIVE DISORDER, IN PARTIAL REMISSION (HCC): ICD-10-CM

## 2021-08-27 DIAGNOSIS — E78.00 HYPERCHOLESTEREMIA: ICD-10-CM

## 2021-08-27 DIAGNOSIS — E53.8 B12 DEFICIENCY: ICD-10-CM

## 2021-08-27 DIAGNOSIS — F41.1 GAD (GENERALIZED ANXIETY DISORDER): ICD-10-CM

## 2021-08-27 PROCEDURE — 90732 PPSV23 VACC 2 YRS+ SUBQ/IM: CPT

## 2021-08-27 PROCEDURE — 99397 PER PM REEVAL EST PAT 65+ YR: CPT | Performed by: INTERNAL MEDICINE

## 2021-08-27 PROCEDURE — 90471 IMMUNIZATION ADMIN: CPT

## 2021-08-27 PROCEDURE — 99214 OFFICE O/P EST MOD 30 MIN: CPT | Performed by: INTERNAL MEDICINE

## 2021-08-27 RX ORDER — ROSUVASTATIN CALCIUM 10 MG/1
TABLET, COATED ORAL
Qty: 90 TABLET | Refills: 1 | Status: SHIPPED | OUTPATIENT
Start: 2021-08-27 | End: 2022-02-20 | Stop reason: SDUPTHER

## 2021-08-27 RX ORDER — AMOXICILLIN AND CLAVULANATE POTASSIUM 875; 125 MG/1; MG/1
TABLET, FILM COATED ORAL 2 TIMES DAILY
COMMUNITY
Start: 2021-08-26 | End: 2021-09-05

## 2021-08-27 RX ORDER — ZOSTER VACCINE RECOMBINANT, ADJUVANTED 50 MCG/0.5
KIT INTRAMUSCULAR
Qty: 0.5 ML | Refills: 1 | Status: CANCELLED | OUTPATIENT
Start: 2021-08-27

## 2021-08-27 NOTE — ACP (ADVANCE CARE PLANNING)
Advance Care Planning   Advance Care Planning (ACP) Physician/NP/PA (Provider) Conversation    Date of ACP Conversation: 08/27/21  Persons included in Conversation:  patient  Length of ACP Conversation in minutes: <16 minutes (Non-Billable)    Authorized Decision Maker (if patient is incapable of making informed decisions):   Named in Advance Directive or Healthcare Power of       Primary Decision Maker: Rocky Quinton - [phone]    Secondary Decision Maker: Carissa Castillo - Son    Secondary Decision Maker: Mateusz Valdovinosy - Son    She has an advanced directive - a copy has been provided & still reflects her wishes. Reviewed DNR/DNI and patient is not interested- elects Full Code (attempt resuscitation).        Mariola Jimenez MD

## 2021-08-27 NOTE — ASSESSMENT & PLAN NOTE
New dx, asymptomatic, will monitor and repeat at f/u, do not think this is contributing to any of her issues

## 2021-08-27 NOTE — ASSESSMENT & PLAN NOTE
Stable and well controlled, I reviewed treatment options with her, I recommended to see a counselor, I reviewed life style changes to help improve mood, continue current treatment.

## 2021-08-27 NOTE — PROGRESS NOTES
Mae Ornelas is a 76 y.o. female who was seen in clinic today (8/27/2021) for a full physical.           Assessment & Plan:     1. Routine physical examination  2. Advanced care planning/counseling discussion  3. Encounter for immunization  -     ADMIN PNEUMOCOCCAL VACCINE  -     PNEUMOCOCCAL POLYSACCHARIDE VACCINE, 23-VALENT, ADULT OR IMMUNOSUPPRESSED PT DOSE,  4. Obesity (BMI 30.0-34. 9)  Assessment & Plan:  stable, needs improvement, I have recommended the following interventions: encourage exercise and lifestyle education regarding diet. 5. Recurrent major depressive disorder, in partial remission [facility])  Assessment & Plan:  stable, I reviewed treatment options with her, I recommended to see a counselor, I reviewed life style changes to help improve mood, continue current treatment. 6. ENIO (generalized anxiety disorder)  Assessment & Plan:  Stable and well controlled, I reviewed treatment options with her, I recommended to see a counselor, I reviewed life style changes to help improve mood, continue current treatment. 7. Hypercholesteremia  Assessment & Plan:  well controlled, continue current treatment, will defer labs as she just had then done and she hs been well controlled the last few years   Orders:  -     rosuvastatin (CRESTOR) 10 mg tablet; TAKE 1 TABLET AT NIGHT, Normal, Disp-90 Tablet, R-1  8. Post-nasal drip  Assessment & Plan:  well controlled, continue current treatment    9. Neuropathy of left lower extremity  Comments:  unchanged, has f/u with neurology next month, will continue w/ supplements, will hold off on MRI until she sees specialist  10. B12 deficiency  Assessment & Plan:  New dx, feels better already on supplements (increase in energy), will continue w/ PO supplement   11.  Subclinical hypothyroidism  Assessment & Plan:  New dx, asymptomatic, will monitor and repeat at f/u, do not think this is contributing to any of her issues     Follow-up and Dispositions    · Return in about 6 months (around 2/27/2022) for Regular follow up. Subjective:   Arnaud Zabala is here today for a full physical.      Routine Physical Exam    The following acute and/or chronic problems were addressed today:    Mental Health Review  Patient is seen for anxiety & depression. Any available GAD7 and PHQ9 reviewed. Reports experiences the following side effects from the treatment: none. Cardiovascular Review  The patient has hyperlipidemia. She reports taking medications as instructed, no medication side effects noted. She generally follows a low fat low cholesterol diet, exercises regularly. Endocrine Review  She is seen for pre-diabetes. {Diabetic diet compliance: compliant all of the time. Lab review: labs reviewed and discussed with patient. She is seen for osteopenia. Since last visit: no changes. She is on the following treatment: Fosamax 35mg. She reports compliance with all meds, and denies any med side effects. End of Life Planning: This was discussed with her today and she has an advanced directive - a copy has been provided. Reviewed DNR/DNI and patient is not interested.       Depression Screen:  3 most recent PHQ Screens 8/27/2021   Little interest or pleasure in doing things Not at all   Feeling down, depressed, irritable, or hopeless Not at all   Total Score PHQ 2 0   Trouble falling or staying asleep, or sleeping too much Several days   Feeling tired or having little energy Nearly every day   Poor appetite, weight loss, or overeating Not at all   Feeling bad about yourself - or that you are a failure or have let yourself or your family down Not at all   Trouble concentrating on things such as school, work, reading, or watching TV Nearly every day   Moving or speaking so slowly that other people could have noticed; or the opposite being so fidgety that others notice Not at all   Thoughts of being better off dead, or hurting yourself in some way Not at all   PHQ 9 Score 7 How difficult have these problems made it for you to do your work, take care of your home and get along with others Somewhat difficult         Fall Risk:   Fall Risk Assessment, last 12 mths 8/27/2021   Able to walk? Yes   Fall in past 12 months? 0   Do you feel unsteady? 1   Are you worried about falling 0   Is TUG test greater than 12 seconds? 0   Is the gait abnormal? 0   Number of falls in past 12 months -   Fall with injury? -       Abuse Screen:  Abuse Screening Questionnaire 8/27/2021   Do you ever feel afraid of your partner? N   Are you in a relationship with someone who physically or mentally threatens you? N   Is it safe for you to go home? Y       Do you average more than 1 drink per night or more than 7 drinks a week:  No    On any one occasion in the past three months have you have had more than 3 drinks containing alcohol:  No    Health Maintenance:   Daily Aspirin: no  Bone Density: done 1/24/20 - osteopenia    Immunizations:   Covid: up to date  Influenza: will plan on getting it this fall  Tetanus: up to date  Shingles: not up to date - only received 1st vaccine  Pneumonia: will do today  Cancer screening:   Cervical: reviewed guidelines, n/a - s/p hysterectomy  Breast: reviewed guidelines, up to date  Colon: reviewed guidelines, up to date        Patient Care Team:  Holli Espinoza MD as PCP - General (Internal Medicine)  Holli Espinoza MD as PCP - [facility] Empaneled Provider  Noemí Guadalupe MD (Cardiology)  Sharlene Hannah MD (Obstetrics & Gynecology)  Lary Naranjo MD (Ophthalmology)  Aliza Geiger MD (Otolaryngology)       The following sections were reviewed & updated as appropriate: Problem List, Allergies, PMH, PSH, FH, and SH. Prior to Admission medications    Medication Sig Start Date End Date Taking? Authorizing Provider   amoxicillin-clavulanate (AUGMENTIN) 875-125 mg per tablet Take  by mouth two (2) times a day.  8/26/21 9/5/21 Yes Provider, Historical cyanocobalamin, vitamin B-12, (VITAMIN B-12 PO) Take 3,000 mcg by mouth daily. Yes Provider, Historical   alendronate (FOSAMAX) 35 mg tablet Take 1 Tablet by mouth every seven (7) days. 8/9/21  Yes Indu Spring MD   sertraline (ZOLOFT) 100 mg tablet TAKE 1 & 1/2 TABLETS DAILY 7/19/21  Yes Indu Spring MD   ammonium lactate (AMLACTIN) 12 % topical cream  4/23/21  Yes Provider, Historical   spironolactone (ALDACTONE) 50 mg tablet 50 mg daily. 6/18/21  Yes Provider, Historical   buPROPion XL (WELLBUTRIN XL) 300 mg XL tablet TAKE ONE TABLET DAILY 6/18/21  Yes Indu Spring MD   rosuvastatin (CRESTOR) 10 mg tablet TAKE 1 TABLET AT NIGHT 11/1/20  Yes Indu Spring MD   CALCIUM PO Take  by mouth daily. Yes Provider, Historical   naproxen sodium (ALEVE PO) Take  by mouth as needed. Yes Provider, Historical   ibuprofen (MOTRIN) 200 mg tablet Take 400 mg by mouth every six (6) hours as needed for Pain. Yes Provider, Historical   FEXOFENADINE HCL (ALLEGRA PO) Take 1 Tab by mouth daily as needed. Yes Provider, Historical   cinnamon bark (CINNAMON) 500 mg cap Take 2 Tablets by mouth daily. Yes Provider, Historical   cholecalciferol (VITAMIN D3) 1,000 unit tablet Take 2,000 Units by mouth two (2) times a day. Yes Provider, Historical          Review of Systems   Neurological: Positive for dizziness (improving, did see ENT, has US of neck scheduled) and sensory change (L leg, still present, did not see PT yet due to dizziness, has f/u with neurology next month). All other systems reviewed and are negative. Objective:   Physical Exam  Constitutional:       General: She is not in acute distress. Appearance: Normal appearance. Eyes:      Conjunctiva/sclera: Conjunctivae normal.   Neck:      Thyroid: No thyroid mass or thyromegaly. Cardiovascular:      Rate and Rhythm: Regular rhythm. Heart sounds: No murmur heard.      Pulmonary:      Effort: Pulmonary effort is normal.      Breath sounds: Normal breath sounds. No decreased breath sounds or wheezing. Abdominal:      General: Bowel sounds are normal.      Palpations: Abdomen is soft. Tenderness: There is no abdominal tenderness. Musculoskeletal:      Lumbar back: No tenderness or bony tenderness. Normal range of motion. Right lower leg: No edema. Left lower leg: No edema.    Psychiatric:         Mood and Affect: Mood and affect normal.            Visit Vitals  BP 96/63   Pulse 73   Temp 97.3 °F (36.3 °C) (Temporal)   Resp 14   Ht 5' 5.5\" (1.664 m)   Wt 188 lb (85.3 kg)   SpO2 96%   BMI 30.81 kg/m²          Michel Landis MD

## 2021-08-27 NOTE — ASSESSMENT & PLAN NOTE
stable, I reviewed treatment options with her, I recommended to see a counselor, I reviewed life style changes to help improve mood, continue current treatment.

## 2021-08-27 NOTE — ASSESSMENT & PLAN NOTE
well controlled, continue current treatment, will defer labs as she just had then done and she hs been well controlled the last few years PAST SURGICAL HISTORY:  History of surgery stent placement

## 2021-08-27 NOTE — PROGRESS NOTES
Carlee Piedra Dr. Nathanel Bernheim yesterday    Nina Gifford  is a 76 y.o. who presents for routine immunizations. Prior to vaccine administration: Consent was obtained. Risks and adverse reactions were discussed. The patient was provided the VIS and they were given an opportunity to ask questions; all questions were addressed. She  denies any symptoms, reactions or allergies that would exclude them from being immunized today. There were no adverse reactions observed post vaccination. Patient was advised to seek medical or call the office with any questions or concerns post vaccination. Patient verbalized understanding.    Ana Mooney RN

## 2021-08-27 NOTE — ASSESSMENT & PLAN NOTE
stable, needs improvement, I have recommended the following interventions: encourage exercise and lifestyle education regarding diet.

## 2021-08-27 NOTE — PATIENT INSTRUCTIONS
Pneumococcal Polysaccharide Vaccine: What You Need to Know  Why get vaccinated? Pneumococcal polysaccharide vaccine (PPSV23) can prevent pneumococcal disease. Pneumococcal disease refers to any illness caused by pneumococcal bacteria. These bacteria can cause many types of illnesses, including pneumonia, which is an infection of the lungs. Pneumococcal bacteria are one of the most common causes of pneumonia. Besides pneumonia, pneumococcal bacteria can also cause:  · Ear infections,  · Sinus infections  · Meningitis (infection of the tissue covering the brain and spinal cord)  · Bacteremia (bloodstream infection)  Anyone can get pneumococcal disease, but children under 3years of age, people with certain medical conditions, adults 72 years or older, and cigarette smokers are at the highest risk. Most pneumococcal infections are mild. However, some can result in long-term problems, such as brain damage or hearing loss. Meningitis, bacteremia, and pneumonia caused by pneumococcal disease can be fatal.  PPSV23  PPSV23 protects against 23 types of bacteria that cause pneumococcal disease. PPSV23 is recommended for:  · All adults 72 years or older,  · Anyone 2 years or older with certain medical conditions that can lead to an increased risk for pneumococcal disease. Most people need only one dose of PPSV23. A second dose of PPSV23, and another type of pneumococcal vaccine called PCV13, are recommended for certain high-risk groups. Your health care provider can give you more information. People 65 years or older should get a dose of PPSV23 even if they have already gotten one or more doses of the vaccine before they turned 65. Talk with your health care provider  Tell your vaccine provider if the person getting the vaccine:  · Has had an allergic reaction after a previous dose of PPSV23, or has any severe, life-threatening allergies.   In some cases, your health care provider may decide to postpone PPSV23 vaccination to a future visit. People with minor illnesses, such as a cold, may be vaccinated. People who are moderately or severely ill should usually wait until they recover before getting PPSV23. Your health care provider can give you more information. Risks of a vaccine reaction  · Redness or pain where the shot is given, feeling tired, fever, or muscle aches can happen after PPSV23. People sometimes faint after medical procedures, including vaccination. Tell your provider if you feel dizzy or have vision changes or ringing in the ears. As with any medicine, there is a very remote chance of a vaccine causing a severe allergic reaction, other serious injury, or death. What if there is a serious problem? An allergic reaction could occur after the vaccinated person leaves the clinic. If you see signs of a severe allergic reaction (hives, swelling of the face and throat, difficulty breathing, a fast heartbeat, dizziness, or weakness), call 9-1-1 and get the person to the nearest hospital.  For other signs that concern you, call your health care provider. Adverse reactions should be reported to the Vaccine Adverse Event Reporting System (VAERS). Your health care provider will usually file this report, or you can do it yourself. Visit the VAERS website at www.vaers. hhs.gov at www.vaers. hhs.gov or call 9-176.467.5803. VAERS is only for reporting reactions, and VAERS staff do not give medical advice. How can I learn more? · Ask your health care provider. · Call your local or state health department. · Contact the Centers for Disease Control and Prevention (CDC):  ? Call 2-456.845.4065 (1-800-CDC-INFO) or  ? Visit CDC's website at www.cdc.gov/vaccines  Vaccine Information Statement  PPSV23 Vaccine  10/30/2019  [facility] and [facility] for Disease Control and Prevention  Many Vaccine Information Statements are available in Telugu and other languages. See www.immunize.org/vis.   Hojas de información Sobre Vacunas están disponibles en español y en muchos otros idiomas. Visite [employer]. Care instructions adapted under license by [employer] (which disclaims liability or warranty for this information). If you have questions about a medical condition or this instruction, always ask your healthcare professional. [address] any warranty or liability for your use of this information.

## 2021-09-01 ENCOUNTER — TRANSCRIBE ORDER (OUTPATIENT)
Dept: SCHEDULING | Age: 66
End: 2021-09-01

## 2021-09-01 DIAGNOSIS — R42 DIZZINESS AND GIDDINESS: Primary | ICD-10-CM

## 2021-09-01 DIAGNOSIS — J01.00 ACUTE MAXILLARY SINUSITIS, UNSPECIFIED: ICD-10-CM

## 2021-09-15 ENCOUNTER — OFFICE VISIT (OUTPATIENT)
Dept: NEUROLOGY | Age: 66
End: 2021-09-15
Payer: COMMERCIAL

## 2021-09-15 VITALS
DIASTOLIC BLOOD PRESSURE: 70 MMHG | BODY MASS INDEX: 30.7 KG/M2 | HEIGHT: 66 IN | SYSTOLIC BLOOD PRESSURE: 110 MMHG | HEART RATE: 80 BPM | WEIGHT: 191 LBS | OXYGEN SATURATION: 98 %

## 2021-09-15 DIAGNOSIS — F40.240 CLAUSTROPHOBIA: ICD-10-CM

## 2021-09-15 DIAGNOSIS — G95.9 CERVICAL MYELOPATHY (HCC): Primary | ICD-10-CM

## 2021-09-15 DIAGNOSIS — R42 VERTIGO: ICD-10-CM

## 2021-09-15 PROCEDURE — 99204 OFFICE O/P NEW MOD 45 MIN: CPT | Performed by: PSYCHIATRY & NEUROLOGY

## 2021-09-15 RX ORDER — LORAZEPAM 1 MG/1
TABLET ORAL
Qty: 2 TABLET | Refills: 0 | Status: SHIPPED
Start: 2021-09-15 | End: 2021-10-15

## 2021-09-15 NOTE — PROGRESS NOTES
Neurology Consult Note      HISTORY PROVIDED BY: patient    Chief Complaint:   Chief Complaint   Patient presents with    New Patient    Peripheral Neuropathy      Subjective:    Jay Hess is a 76 y.o. right handed female who presents in consultation for sensory changes. She notes that beginning at her left buttocks down lateral leg to foot she cannot feel hot or cold, able to feel touch, not numb. Has tingling in left toes, maybe a little on right, same in fingers L>>R. The tingling has been off and on for awhile, just noticed the temperature change this summer when she got in a pool. She denies injury. Occasionally has pain on left buttock where dimple is, has a chronic lipoma there, but no LBP or radicular leg pain. Has bladder urgency. Has trouble walking, attributed to inflamed bursa on left heel. Her  has been telling her for a while that she is dragging her feet when she walks. Her PCP referred her for PT, but has this on hold due to bursa issue. 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 She had vertigo last week, has never had this before, has seen ENT and was told it was not her ear. It was worse with standing or moving, but not worse with supine position, had mild nausea, no hiccups, no diplopia. Past Medical History:   Diagnosis Date    Anxiety disorder     Carpal tunnel syndrome of left wrist     DDD (degenerative disc disease), thoracic 10/13/14    Depression     Hypercholesterolemia     Menopause     LMP-?     Neurofibroma determined by biopsy of tongue     Dr. Marla Mcduffie Prediabetes 2016    A1C 6.1    Scoliosis 10/13/14    thoracic spine, mild      Past Surgical History:   Procedure Laterality Date    HX CARPAL TUNNEL RELEASE Left     HX  SECTION      HX  SECTION      HX COLONOSCOPY  2020    nl whole colon & TI-bx    HX HEENT  2017    tongue biopsy    HX ORTHOPAEDIC Right 2021    contracture - s/p tendon sheath release    HX SHANNA AND BSO  1996    Fibroids      Social History     Socioeconomic History    Marital status:      Spouse name: Not on file    Number of children: Not on file    Years of education: Not on file    Highest education level: Not on file   Occupational History    Not on file   Tobacco Use    Smoking status: Former Smoker     Packs/day: 0.00     Years: 30.00     Pack years: 0.00     Quit date: 2020     Years since quittin.7    Smokeless tobacco: Never Used    Tobacco comment: stopped smoking 0n January 3, 2021   Vaping Use    Vaping Use: Never used   Substance and Sexual Activity    Alcohol use: Yes     Alcohol/week: 1.0 standard drinks     Types: 1 Glasses of wine per week     Comment: occassional    Drug use: Not Currently    Sexual activity: Yes     Partners: Male     Birth control/protection: None   Other Topics Concern     Service Not Asked    Blood Transfusions Not Asked    Caffeine Concern Not Asked    Occupational Exposure Not Asked    Hobby Hazards Not Asked    Sleep Concern Not Asked    Stress Concern Not Asked    Weight Concern Not Asked    Special Diet Not Asked    Back Care Not Asked    Exercise Not Asked    Bike Helmet Not Asked    [facility] Not Asked    Self-Exams Not Asked   Social History Narrative    Not on file     Social Determinants of Health     Financial Resource Strain:     Difficulty of Paying Living Expenses:    Food Insecurity:     Worried About Running Out of Food in the Last Year:     [address] in the Last Year:    Transportation Needs:     Lack of Transportation (Medical):      Lack of Transportation (Non-Medical):    Physical Activity:     Days of Exercise per Week:     Minutes of Exercise per Session:    Stress:     Feeling of Stress :    Social Connections:     Frequency of Communication with Friends and Family:     Frequency of Social Gatherings with Friends and Family:     Attends [employer] Services:     Active Member of Clubs or Organizations:     Attends Club or Organization Meetings:     Marital Status:    Intimate Partner Violence:     Fear of Current or Ex-Partner:     Emotionally Abused:     Physically Abused:     Sexually Abused:      Family History   Problem Relation Age of Onset    Coronary Artery Disease Father         76, first episode late 63's    Alzheimer Father     Heart Disease Father         Artery disease, Alzhimers    Pacemaker Brother         2/21    Thyroid Disease Brother     Arrhythmia Mother         Afib     Breast Cancer Mother 52    Thyroid Disease Mother     Cancer Mother         Breast Cancer    Heart Disease Mother         Tachycardia, leaky vlave    Arthritis-osteo Mother     Breast Cancer Maternal Grandmother         Great grandmother/age unknown    No Known Problems Son     No Known Problems Son     Bleeding Prob Sister         on perscription blood thinners due to minor stroke    Stroke Sister         small stroke due to lack of liver enzyme    Anesth Problems Neg Hx          Objective:   Review of Systems   Constitutional: Positive for malaise/fatigue. Respiratory: Positive for shortness of breath. Snoring   Neurological: Positive for dizziness, sensory change and weakness. Psychiatric/Behavioral: Positive for depression and memory loss. The patient is nervous/anxious. All other systems reviewed and are negative. Allergies   Allergen Reactions    Amoxicillin-Pot Clavulanate Nausea Only    Keflex [Cephalexin] Nausea Only    Nitrofurantoin Monohyd/M-Cryst Nausea Only        Meds:  Outpatient Medications Prior to Visit   Medication Sig Dispense Refill    cyanocobalamin, vitamin B-12, (VITAMIN B-12 PO) Take 3,000 mcg by mouth daily.  rosuvastatin (CRESTOR) 10 mg tablet TAKE 1 TABLET AT NIGHT 90 Tablet 1    alendronate (FOSAMAX) 35 mg tablet Take 1 Tablet by mouth every seven (7) days. 12 Tablet 1    sertraline (ZOLOFT) 100 mg tablet TAKE 1 & 1/2 TABLETS DAILY 135 Tablet 1    ammonium lactate (AMLACTIN) 12 % topical cream       spironolactone (ALDACTONE) 50 mg tablet 50 mg daily.  buPROPion XL (WELLBUTRIN XL) 300 mg XL tablet TAKE ONE TABLET DAILY 90 Tablet 1    CALCIUM PO Take  by mouth daily.  naproxen sodium (ALEVE PO) Take  by mouth as needed.  ibuprofen (MOTRIN) 200 mg tablet Take 400 mg by mouth every six (6) hours as needed for Pain.  FEXOFENADINE HCL (ALLEGRA PO) Take 1 Tab by mouth daily as needed.  cinnamon bark (CINNAMON) 500 mg cap Take 2 Tablets by mouth daily.  cholecalciferol (VITAMIN D3) 1,000 unit tablet Take 2,000 Units by mouth two (2) times a day. No facility-administered medications prior to visit. Imaging:  MRI Results (most recent):  Results from Hospital Encounter encounter on 12/22/15    MRI ORB FACE NECK W WO CONT    Narrative  **Final Report**      ICD Codes / Adm. Diagnosis: 237.79  210.1 / Other neurofibromatosis(237.79  Benign neoplasm of tongue  Examination:  MR ORBIT [facility] NECK W AND WO CON  - [id number] - Dec 22 2015  9:18AM  Accession No:  [id number]  Reason:  Benign neoplasm of tongue      REPORT:  INDICATION: Neurofibroma of the tongue. Status post biopsy. Discomfort on  the right side of the.    COMPARISON: 9/15/2015. TECHNIQUE: Axial T1, STIR, and T2 with and without fat-sat and with and  without gadolinium, sagittal T1, coronal T1 with and without gadolinium,  coronal STIR    Contrast: 8.5 cc Gadavist    FINDINGS: Note is made of of the operative finding of right lung base  lingual tonsil hypertrophy. Note is also made of a pathologic diagnosis of  neurofibroma. There is a nodule along the base of the tongue just to the  right of midline which measures approximately 7-8 mm. This does demonstrate  peripheral enhancement. This is consistent with the previous clinical  identification of neurofibroma.  It lies along the surface of the tongue. Deeper tongue soft tissues demonstrate no abnormality. It is approximately  the same size and configuration as on the previous MRI examination. No other  lesions are identified. Parotid and sublingual glands are unremarkable. There is no adenopathy. .    Visualized portions of the paranasal sinuses are clear. Craniocervical junction is unremarkable, however there is degenerative  change of the cervical spine with congenitally narrow canal. Note is made of  deformity of the cord secondary to spondylosis. Impression  : The nodule identified at surgery and biopsy is again seen along  the base of the tongue to the right of midline. It does demonstrate  peripheral enhancement only and is unchanged in size when compared to the  previous exam. There is no evidence of involvement of the deeper soft  tissues of the adjacent tongue. No other mass or adenopathy seen. Signing/Reading Doctor: Evgeny Carmona ([id number])  Approved: Evgeny Carmona ([id number])  Dec 22 2015  2:06PM     CT Results (most recent):  Results from [place] encounter on 09/30/16    CT CHEST WO CONT    Narrative  CT CHEST WITHOUT CONTRAST. 9/30/2016 11:05 AM    INDICATION: New lung nodule in the left apex seen on chest radiograph. COMPARISON: Chest radiograph 9/26/2016, 6/19/2014; CT neck 8/20/2015. TECHNIQUE: CT of the chest was performed without contrast. Coronal and sagittal  reconstructions were performed. CT dose reduction was achieved through use of a  standardized protocol tailored for this examination and automatic exposure  control for dose modulation. FINDINGS:  The lungs are clear. Specifically, there is no CT correlate for the nodule seen  in the left upper lobe on radiography. The central airways are patent. No  pneumothorax or pleural effusion. The heart size is normal. No thoracic  lymphadenopathy. A tiny left upper pole renal calculus is nonobstructing.  The  visualized unenhanced upper abdomen is otherwise normal.    Impression  IMPRESSION:  Clear lungs. No CT correlate for the radiographic nodule. Reviewed records in [employer] tab today    Lab Review   Results for orders placed or performed in visit on 08/04/21   CBC WITH AUTOMATED DIFF   Result Value Ref Range    WBC 6.8 3.4 - 10.8 x10E3/uL    RBC 4.63 3.77 - 5.28 x10E6/uL    HGB 13.5 11.1 - 15.9 g/dL    HCT 41.2 34.0 - 46.6 %    MCV 89 79 - 97 fL    MCH 29.2 26.6 - 33.0 pg    MCHC 32.8 31.5 - 35.7 g/dL    RDW 13.8 11.7 - 15.4 %    PLATELET 263 520 - 168 x10E3/uL    NEUTROPHILS 79 Not Estab. %    Lymphocytes 9 Not Estab. %    MONOCYTES 7 Not Estab. %    EOSINOPHILS 4 Not Estab. %    BASOPHILS 0 Not Estab. %    ABS. NEUTROPHILS 5.4 1.4 - 7.0 x10E3/uL    Abs Lymphocytes 0.6 (L) 0.7 - 3.1 x10E3/uL    ABS. MONOCYTES 0.5 0.1 - 0.9 x10E3/uL    ABS. EOSINOPHILS 0.3 0.0 - 0.4 x10E3/uL    ABS. BASOPHILS 0.0 0.0 - 0.2 x10E3/uL    IMMATURE GRANULOCYTES 1 Not Estab. %    ABS. IMM. GRANS. 0.0 0.0 - 0.1 x10E3/uL   HEMOGLOBIN A1C WITH EAG   Result Value Ref Range    Hemoglobin A1c 6.1 (H) 4.8 - 5.6 %    Estimated average glucose 128 mg/dL   TSH 3RD GENERATION   Result Value Ref Range    TSH 5.590 (H) 0.450 - 4.500 uIU/mL   VITAMIN B12   Result Value Ref Range    Vitamin B12 216 (L) 232 - 3,707 pg/mL   METABOLIC PANEL, COMPREHENSIVE   Result Value Ref Range    Glucose 105 (H) 65 - 99 mg/dL    BUN 22 8 - 27 mg/dL    Creatinine 1.32 (H) 0.57 - 1.00 mg/dL    GFR est non-AA 42 (L) >59 mL/min/1.73    GFR est AA 48 (L) >59 mL/min/1.73    BUN/Creatinine ratio 17 12 - 28    Sodium 141 134 - 144 mmol/L    Potassium 4.8 3.5 - 5.2 mmol/L    Chloride 106 96 - 106 mmol/L    CO2 22 20 - 29 mmol/L    Calcium 9.4 8.7 - 10.3 mg/dL    Protein, total 6.3 6.0 - 8.5 g/dL    Albumin 4.3 3.8 - 4.8 g/dL    GLOBULIN, TOTAL 2.0 1.5 - 4.5 g/dL    A-G Ratio 2.2 1.2 - 2.2    Bilirubin, total 0.3 0.0 - 1.2 mg/dL    Alk.  phosphatase 97 48 - 121 IU/L    AST (SGOT) 17 0 - 40 IU/L    ALT (SGPT) 16 0 - 32 IU/L   VITAMIN D, 25 HYDROXY   Result Value Ref Range    VITAMIN D, 25-HYDROXY 29.7 (L) 30.0 - 100.0 ng/mL        Exam:  Visit Vitals  /70   Pulse 80   Ht 5' 6\" (1.676 m)   Wt 191 lb (86.6 kg)   SpO2 98%   BMI 30.83 kg/m²     General:  Alert, cooperative, no distress. Head:  Normocephalic, without obvious abnormality, atraumatic. Respiratory:  Heart:   Non labored breathing  Regular rate and rhythm, no murmurs   Neck:   2+ carotids, no bruits   Extremities: Warm, no cyanosis or edema. Pulses: 2+ radial pulses. Neurologic:  MS: Alert and oriented x 4, speech intact. Language intact. Attention and fund of knowledge appropriate. Recent and remote memory intact. Cranial Nerves:  II: visual fields Full to confrontation   II: pupils Equal, round, reactive to light   II: optic disc    III,VII: ptosis none   III,IV,VI: extraocular muscles  EOMI, no nystagmus or diplopia   V: facial light touch sensation  normal   VII: facial muscle function   symmetric   VIII: hearing intact   IX: soft palate elevation  normal   XI: trapezius strength  5/5   XI: sternocleidomastoid strength 5/5   XII: tongue  Midline     Motor: normal bulk and tone, no tremor              Strength: 5/5 throughout, no PD  Sensory: Dec to PP in left hand and great toe, dec cold on left lateral leg  Coordination: FTN and HTS intact, CHANTAL intact  Gait: Mildly ataxic gait, able to tandem walk with mild difficulty  Reflexes: 3+ symmetric, left toe up, right toe down, +High's bilaterally           Assessment/Plan   Pt is a 76 y.o. right handed female with loss of temperature sensation beginning at her left buttocks down lateral leg to foot, has tingling in left toes, maybe a little on right, same in fingers L>>R. No LBP or radicular leg pain, +bladder urgency, +change in gait. Prior head and neck imaging revealed incidental cervical spine disease.  Last week had acute onset of vertigo with mild nausea, non-positional with negative ENT evaluation. Exam with brisk reflexes, +High's, upgoing toe on left, dec pp in left great toe and loss of temperature sensation on left lateral leg, mildly ataxic gait. History and exam are concerning for cervical myelopathy recommend MRI of the C-spine w/wo contrast to assess for cord signal change, possibly due to cord compression versus demyelinating lesion with recent sudden onset of nonpositional vertigo raising concern for demyelinating disease. Recommend MRI of the brain w/wo contrast.  Follow-up is made for next available, patient instructed to call after her MRIs are completed to discuss results and next steps. ICD-10-CM ICD-9-CM    1. Cervical myelopathy (HCC)  G95.9 721.1 MRI CERV SPINE W WO CONT   2. Vertigo  R42 780.4 MRI BRAIN W WO CONT   3. Claustrophobia  F40.240 300.29 LORazepam (ATIVAN) 1 mg tablet       Signed:   Edward Wallace MD  9/15/2021

## 2021-09-15 NOTE — LETTER
9/20/2021    Patient: Vannessa Bran   YOB: 1955   Date of Visit: 9/15/2021     Kaylee Banks, [address] [facility]  [address] [place] [address]  [place]    Dear Kaylee Banks MD,      Thank you for referring Ms. Jean Carlos Gan to [address] [place] [address] for evaluation. My notes for this consultation are attached. If you have questions, please do not hesitate to call me. I look forward to following your patient along with you.       Sincerely,    Buzz Henson MD

## 2021-09-16 ENCOUNTER — TELEPHONE (OUTPATIENT)
Dept: NEUROLOGY | Age: 66
End: 2021-09-16

## 2021-09-16 NOTE — TELEPHONE ENCOUNTER
Manpreet form [employer] prior Tawana Moreiraok has some peer-peer information and would like to talk to dr. Mady lowry

## 2021-09-16 NOTE — TELEPHONE ENCOUNTER
----- Message from April Rabb sent at 9/16/2021 12:34 PM EDT -----  Regarding: /Telephone  Patient return call    Caller's first and last name and relationship (if not the patient):  [facility] department       Best contact number(s): [phone]      Whose call is being returned: Phuong Banks      Details to clarify the request: Returning your phone call       April Rabb

## 2021-09-20 NOTE — TELEPHONE ENCOUNTER
Italo Chadwick - Please call our Radiology scheduling department and ask if they can resubmit the orders with the clinical information. The test was scheduled prior to my finishing my visit note, so I know that her insurance company did not review this. I will not do a [id number] and if her insurance company refuses to pay for MRI C-spine in this clearly myelopathic patient, she can tina them for malpractice.

## 2021-09-21 ENCOUNTER — HOSPITAL ENCOUNTER (OUTPATIENT)
Dept: MRI IMAGING | Age: 66
End: 2021-09-21
Attending: PSYCHIATRY & NEUROLOGY
Payer: COMMERCIAL

## 2021-09-21 ENCOUNTER — TELEPHONE (OUTPATIENT)
Dept: NEUROLOGY | Age: 66
End: 2021-09-21

## 2021-09-21 ENCOUNTER — HOSPITAL ENCOUNTER (OUTPATIENT)
Dept: MRI IMAGING | Age: 66
Discharge: HOME | End: 2021-09-21
Attending: PSYCHIATRY & NEUROLOGY
Payer: COMMERCIAL

## 2021-09-21 VITALS — WEIGHT: 189 LBS | BODY MASS INDEX: 30.51 KG/M2

## 2021-09-21 DIAGNOSIS — G95.9 CERVICAL MYELOPATHY (HCC): ICD-10-CM

## 2021-09-21 DIAGNOSIS — G95.9 CERVICAL MYELOPATHY (HCC): Primary | ICD-10-CM

## 2021-09-21 LAB — CREAT BLD-MCNC: 1.5 MG/DL (ref 0.6–1.3)

## 2021-09-21 PROCEDURE — 72156 MRI NECK SPINE W/O & W/DYE: CPT

## 2021-09-21 PROCEDURE — 82565 ASSAY OF CREATININE: CPT

## 2021-09-21 PROCEDURE — A9575 INJ GADOTERATE MEGLUMI 0.1ML: HCPCS | Performed by: PSYCHIATRY & NEUROLOGY

## 2021-09-21 PROCEDURE — 74011250636 HC RX REV CODE- 250/636: Performed by: PSYCHIATRY & NEUROLOGY

## 2021-09-21 RX ORDER — GADOTERATE MEGLUMINE 376.9 MG/ML
17 INJECTION INTRAVENOUS
Status: COMPLETED | OUTPATIENT
Start: 2021-09-21 | End: 2021-09-21

## 2021-09-21 RX ADMIN — GADOTERATE MEGLUMINE 17 ML: 376.9 INJECTION INTRAVENOUS at 10:19

## 2021-09-21 NOTE — TELEPHONE ENCOUNTER
Spoke w/ Mariposa. She states that the MRI C-spine has been approved. Called pt to clarify no answer left message to call office.

## 2021-09-21 NOTE — TELEPHONE ENCOUNTER
MRI C-spine 9/21/21 with severe central stenosis and cord compression and signal change at C3-C4. Call into [employer].

## 2021-09-22 NOTE — TELEPHONE ENCOUNTER
----- Message from Jimbo Adams sent at 9/22/2021  8:28 AM EDT -----  Regarding: /Telephone  Patient return call    Caller's first and last name and relationship (if not the patient): Self       Best contact number(s): [phone]      Whose call is being returned: Dr. Malena Alves      Details to clarify the request: The patient is returning Dr. Jolie Trinidad call.        Jimbo Adams

## 2021-09-22 NOTE — TELEPHONE ENCOUNTER
Called pt. Verified. Pt states that she had the MRI C-spine done on 9/21 but not the MRI Brain. The MRI Brain not covered by [employer]. Spoke w/ Benji Boyd she states the MRI Brain is pending and needing a peer eo peer. She states that the OV notes were in the system prior to scheduling the appt.

## 2021-09-22 NOTE — TELEPHONE ENCOUNTER
Referral fax to Dr. Alexander Mattson at [facility] w/ confirmation and sent to scanning.

## 2021-09-22 NOTE — TELEPHONE ENCOUNTER
Called pt and discussed MRI C-spine results. I spoke with Dr. James Rolle last night, he can see her anytime this week in the office. Called his nurse, Nov, she made an appt for her on Friday. Gave pt contact info to [facility] . Malina - Please send referral to Dr. James Rolle at [facility].

## 2021-09-22 NOTE — TELEPHONE ENCOUNTER
That is not true. I received the phone message requesting a peer to peer prior to my finishing her note. It does not matter at this point.

## 2021-09-24 DIAGNOSIS — F41.8 DEPRESSION WITH ANXIETY: ICD-10-CM

## 2021-09-24 DIAGNOSIS — F41.1 GAD (GENERALIZED ANXIETY DISORDER): ICD-10-CM

## 2021-09-24 DIAGNOSIS — F17.200 TOBACCO DEPENDENCE: ICD-10-CM

## 2021-09-24 RX ORDER — BUPROPION HYDROCHLORIDE 300 MG/1
300 TABLET ORAL DAILY
Qty: 90 TABLET | Refills: 1 | Status: SHIPPED | OUTPATIENT
Start: 2021-09-24 | End: 2021-12-21 | Stop reason: SDUPTHER

## 2021-09-24 RX ORDER — ALENDRONATE SODIUM 35 MG/1
35 TABLET ORAL
Qty: 12 TABLET | Refills: 1 | Status: SHIPPED | OUTPATIENT
Start: 2021-09-24 | End: 2022-05-01 | Stop reason: SDUPTHER

## 2021-09-27 RX ORDER — LANOLIN ALCOHOL/MO/W.PET/CERES
3000 CREAM (GRAM) TOPICAL DAILY
COMMUNITY
Start: 2021-09-27 | End: 2021-09-27

## 2021-09-27 RX ORDER — LANOLIN ALCOHOL/MO/W.PET/CERES
1000 CREAM (GRAM) TOPICAL
COMMUNITY
Start: 2021-09-27

## 2021-10-04 ENCOUNTER — TRANSCRIBE ORDER (OUTPATIENT)
Dept: REGISTRATION | Age: 66
End: 2021-10-04

## 2021-10-04 DIAGNOSIS — Z01.812 PRE-PROCEDURE LAB EXAM: Primary | ICD-10-CM

## 2021-10-08 ENCOUNTER — HOSPITAL ENCOUNTER (OUTPATIENT)
Dept: PREADMISSION TESTING | Age: 66
Discharge: HOME | End: 2021-10-08
Payer: COMMERCIAL

## 2021-10-08 VITALS
HEIGHT: 66 IN | DIASTOLIC BLOOD PRESSURE: 71 MMHG | WEIGHT: 186.51 LBS | RESPIRATION RATE: 16 BRPM | HEART RATE: 83 BPM | TEMPERATURE: 97.9 F | SYSTOLIC BLOOD PRESSURE: 104 MMHG | BODY MASS INDEX: 29.97 KG/M2

## 2021-10-08 LAB
ANION GAP SERPL CALC-SCNC: 2 MMOL/L (ref 5–15)
BASOPHILS # BLD: 0 K/UL (ref 0–0.1)
BASOPHILS NFR BLD: 0 % (ref 0–1)
BUN SERPL-MCNC: 25 MG/DL (ref 6–20)
BUN/CREAT SERPL: 18 (ref 12–20)
CALCIUM SERPL-MCNC: 8.7 MG/DL (ref 8.5–10.1)
CHLORIDE SERPL-SCNC: 110 MMOL/L (ref 97–108)
CO2 SERPL-SCNC: 27 MMOL/L (ref 21–32)
CREAT SERPL-MCNC: 1.36 MG/DL (ref 0.55–1.02)
DIFFERENTIAL METHOD BLD: ABNORMAL
EOSINOPHIL # BLD: 0.2 K/UL (ref 0–0.4)
EOSINOPHIL NFR BLD: 4 % (ref 0–7)
ERYTHROCYTE [DISTWIDTH] IN BLOOD BY AUTOMATED COUNT: 13.5 % (ref 11.5–14.5)
GLUCOSE SERPL-MCNC: 101 MG/DL (ref 65–100)
HCT VFR BLD AUTO: 37.8 % (ref 35–47)
HGB BLD-MCNC: 12.4 G/DL (ref 11.5–16)
IMM GRANULOCYTES # BLD AUTO: 0.1 K/UL (ref 0–0.04)
IMM GRANULOCYTES NFR BLD AUTO: 1 % (ref 0–0.5)
LYMPHOCYTES # BLD: 0.8 K/UL (ref 0.8–3.5)
LYMPHOCYTES NFR BLD: 14 % (ref 12–49)
MCH RBC QN AUTO: 28.6 PG (ref 26–34)
MCHC RBC AUTO-ENTMCNC: 32.8 G/DL (ref 30–36.5)
MCV RBC AUTO: 87.3 FL (ref 80–99)
MONOCYTES # BLD: 0.4 K/UL (ref 0–1)
MONOCYTES NFR BLD: 8 % (ref 5–13)
NEUTS SEG # BLD: 4 K/UL (ref 1.8–8)
NEUTS SEG NFR BLD: 73 % (ref 32–75)
NRBC # BLD: 0 K/UL (ref 0–0.01)
NRBC BLD-RTO: 0 PER 100 WBC
PLATELET # BLD AUTO: 179 K/UL (ref 150–400)
PMV BLD AUTO: 9.9 FL (ref 8.9–12.9)
POTASSIUM SERPL-SCNC: 4.7 MMOL/L (ref 3.5–5.1)
RBC # BLD AUTO: 4.33 M/UL (ref 3.8–5.2)
RBC MORPH BLD: ABNORMAL
SODIUM SERPL-SCNC: 139 MMOL/L (ref 136–145)
WBC # BLD AUTO: 5.5 K/UL (ref 3.6–11)

## 2021-10-08 PROCEDURE — 36415 COLL VENOUS BLD VENIPUNCTURE: CPT

## 2021-10-08 PROCEDURE — 85025 COMPLETE CBC W/AUTO DIFF WBC: CPT

## 2021-10-08 PROCEDURE — 80048 BASIC METABOLIC PNL TOTAL CA: CPT

## 2021-10-08 NOTE — PROGRESS NOTES
Dx: ASHER-CPAP    DO NOT EAT OR DRINK ANYTHING AFTER MIDNIGHT, except as instructed THE NIGHT BEFORE SURGERY. PT GIVEN OPPORTUNITY TO ASK ADDITIONAL QUESTIONS. Patient given two bottles CHG soap and instruction sheet, instructions for use reviewed with patient. Patient given surgical site infection information FAQs handout and hand hygiene tips sheet. Pre-operative instructions reviewed and patient verbalizes understanding of instructions. Patient has been given the opportunity to ask additional questions. [address] APPOINTMENTS REVIEWED AND GIVEN TO PATIENT. COVID-19 INSTRUCTION SHEET ALSO REVIEWED AND GIVEN TO PATIENT. MRSA / MSSA TREATMENT INSTRUCTION SHEET GIVEN WITH AN EXPLANATION TO PATIENT THAT THEY WILL BE NOTIFIED IF TREATMENT INSTRUCTIONS NEED TO BE INITIATED.

## 2021-10-09 LAB
BACTERIA SPEC CULT: NORMAL
BACTERIA SPEC CULT: NORMAL
SERVICE CMNT-IMP: NORMAL

## 2021-10-11 ENCOUNTER — HOSPITAL ENCOUNTER (OUTPATIENT)
Dept: PREADMISSION TESTING | Age: 66
Discharge: HOME | End: 2021-10-11
Payer: COMMERCIAL

## 2021-10-11 DIAGNOSIS — Z01.812 PRE-PROCEDURE LAB EXAM: ICD-10-CM

## 2021-10-11 LAB
SARS-COV-2, XPLCVT: NOT DETECTED
SOURCE, COVRS: NORMAL

## 2021-10-11 PROCEDURE — U0005 INFEC AGEN DETEC AMPLI PROBE: HCPCS

## 2021-10-11 RX ORDER — MUPIROCIN 20 MG/G
OINTMENT TOPICAL 2 TIMES DAILY
Qty: 22 G | Refills: 0 | Status: SHIPPED
Start: 2021-10-11 | End: 2021-10-15

## 2021-10-11 NOTE — PERIOP NOTES
PC to pt, full name and  verified, regarding positive nasal cx (MSSA) and need to start Mupirocin ointment BID x 7 days to B nostrils starting today and bathe with CHG soap for 4 days prior to surgery(DOS 10/14/21). Pt verbalized understanding of instructions and will start today as recommended. Allergies and pharmacy of choice reviewed. Rx escribed to pt's pharmacy of choice. PTA medlist updated. Surgeon and PCP notified of positive culture and treatment. PRESCRIPTION:    MUPIROCIN 2% OINTMENT  QUANTITY:  #22 GRAMS  REFILLS: NONE    Apply 0.25 g (small pea-sized amount) to both nostrils twice a day for seven days.     Karthik Hutson NP

## 2021-10-14 ENCOUNTER — ANESTHESIA (OUTPATIENT)
Dept: SURGERY | Age: 66
End: 2021-10-14
Payer: COMMERCIAL

## 2021-10-14 ENCOUNTER — ANESTHESIA EVENT (OUTPATIENT)
Dept: SURGERY | Age: 66
End: 2021-10-14
Payer: COMMERCIAL

## 2021-10-14 ENCOUNTER — HOSPITAL ENCOUNTER (OUTPATIENT)
Age: 66
Discharge: HOME | End: 2021-10-15
Attending: NEUROLOGICAL SURGERY | Admitting: NEUROLOGICAL SURGERY
Payer: COMMERCIAL

## 2021-10-14 ENCOUNTER — APPOINTMENT (OUTPATIENT)
Dept: GENERAL RADIOLOGY | Age: 66
End: 2021-10-14
Attending: NEUROLOGICAL SURGERY
Payer: COMMERCIAL

## 2021-10-14 DIAGNOSIS — Z98.1 S/P CERVICAL SPINAL FUSION: Primary | ICD-10-CM

## 2021-10-14 PROBLEM — Z98.890 STATUS POST SURGERY: Status: ACTIVE | Noted: 2021-10-14

## 2021-10-14 LAB
GLUCOSE BLD STRIP.AUTO-MCNC: 110 MG/DL (ref 65–117)
SERVICE CMNT-IMP: NORMAL

## 2021-10-14 PROCEDURE — 74011250636 HC RX REV CODE- 250/636: Performed by: NURSE ANESTHETIST, CERTIFIED REGISTERED

## 2021-10-14 PROCEDURE — 77030004391 HC BUR FLUT MEDT -C: Performed by: NEUROLOGICAL SURGERY

## 2021-10-14 PROCEDURE — 77030040922 HC BLNKT HYPOTHRM STRY -A

## 2021-10-14 PROCEDURE — 82962 GLUCOSE BLOOD TEST: CPT

## 2021-10-14 PROCEDURE — 2709999900 HC NON-CHARGEABLE SUPPLY: Performed by: NEUROLOGICAL SURGERY

## 2021-10-14 PROCEDURE — 76060000035 HC ANESTHESIA 2 TO 2.5 HR: Performed by: NEUROLOGICAL SURGERY

## 2021-10-14 PROCEDURE — 77030040356 HC CORD BPLR FRCP COVD -A: Performed by: NEUROLOGICAL SURGERY

## 2021-10-14 PROCEDURE — C1889 IMPLANT/INSERT DEVICE, NOC: HCPCS | Performed by: NEUROLOGICAL SURGERY

## 2021-10-14 PROCEDURE — 77030008684 HC TU ET CUF COVD -B: Performed by: NURSE ANESTHETIST, CERTIFIED REGISTERED

## 2021-10-14 PROCEDURE — 76010000162 HC OR TIME 1.5 TO 2 HR INTENSV-TIER 1: Performed by: NEUROLOGICAL SURGERY

## 2021-10-14 PROCEDURE — 77030014650 HC SEAL MTRX FLOSEL BAXT -C: Performed by: NEUROLOGICAL SURGERY

## 2021-10-14 PROCEDURE — 77030011267 HC ELECTRD BLD COVD -A: Performed by: NEUROLOGICAL SURGERY

## 2021-10-14 PROCEDURE — 77030005513 HC CATH URETH FOL11 MDII -B: Performed by: NEUROLOGICAL SURGERY

## 2021-10-14 PROCEDURE — 77030026438 HC STYL ET INTUB CARD -A: Performed by: NURSE ANESTHETIST, CERTIFIED REGISTERED

## 2021-10-14 PROCEDURE — C1713 ANCHOR/SCREW BN/BN,TIS/BN: HCPCS | Performed by: NEUROLOGICAL SURGERY

## 2021-10-14 PROCEDURE — 74011000250 HC RX REV CODE- 250: Performed by: NEUROLOGICAL SURGERY

## 2021-10-14 PROCEDURE — 74011250636 HC RX REV CODE- 250/636: Performed by: NEUROLOGICAL SURGERY

## 2021-10-14 PROCEDURE — 74011250636 HC RX REV CODE- 250/636: Performed by: ANESTHESIOLOGY

## 2021-10-14 PROCEDURE — 99218 HC RM OBSERVATION: CPT

## 2021-10-14 PROCEDURE — 74011250637 HC RX REV CODE- 250/637: Performed by: NEUROLOGICAL SURGERY

## 2021-10-14 PROCEDURE — 74011000250 HC RX REV CODE- 250: Performed by: NURSE ANESTHETIST, CERTIFIED REGISTERED

## 2021-10-14 PROCEDURE — 76210000017 HC OR PH I REC 1.5 TO 2 HR: Performed by: NEUROLOGICAL SURGERY

## 2021-10-14 PROCEDURE — 77030042508 HC BIT DRL -B: Performed by: NEUROLOGICAL SURGERY

## 2021-10-14 PROCEDURE — 77030003029 HC SUT VCRL J&J -B: Performed by: NEUROLOGICAL SURGERY

## 2021-10-14 PROCEDURE — 77030029099 HC BN WAX SSPC -A: Performed by: NEUROLOGICAL SURGERY

## 2021-10-14 PROCEDURE — 77030019908 HC STETH ESOPH SIMS -A: Performed by: NURSE ANESTHETIST, CERTIFIED REGISTERED

## 2021-10-14 PROCEDURE — 77030002933 HC SUT MCRYL J&J -A: Performed by: NEUROLOGICAL SURGERY

## 2021-10-14 DEVICE — I-FACTOR PUTTY, 1.0CC SYRINGE
Type: IMPLANTABLE DEVICE | Site: SPINE CERVICAL | Status: FUNCTIONAL
Brand: I-FACTOR PEPTIDE ENHANCED BONE GRAFT

## 2021-10-14 DEVICE — COALITION SPACER, 12MM X 14MM, 7DEG, 6MM
Type: IMPLANTABLE DEVICE | Site: SPINE CERVICAL | Status: FUNCTIONAL
Brand: COALITION

## 2021-10-14 DEVICE — 3.6MM BONE SCREW, VARIABLE, SELF-DRILLING, 12MM
Type: IMPLANTABLE DEVICE | Site: SPINE CERVICAL | Status: FUNCTIONAL
Brand: COALITION

## 2021-10-14 RX ORDER — SODIUM CHLORIDE, SODIUM LACTATE, POTASSIUM CHLORIDE, CALCIUM CHLORIDE 600; 310; 30; 20 MG/100ML; MG/100ML; MG/100ML; MG/100ML
75 INJECTION, SOLUTION INTRAVENOUS CONTINUOUS
Status: DISCONTINUED | OUTPATIENT
Start: 2021-10-14 | End: 2021-10-14 | Stop reason: HOSPADM

## 2021-10-14 RX ORDER — ROCURONIUM BROMIDE 10 MG/ML
INJECTION, SOLUTION INTRAVENOUS AS NEEDED
Status: DISCONTINUED | OUTPATIENT
Start: 2021-10-14 | End: 2021-10-14 | Stop reason: HOSPADM

## 2021-10-14 RX ORDER — SODIUM CHLORIDE 0.9 % (FLUSH) 0.9 %
5-40 SYRINGE (ML) INJECTION AS NEEDED
Status: DISCONTINUED | OUTPATIENT
Start: 2021-10-14 | End: 2021-10-14 | Stop reason: HOSPADM

## 2021-10-14 RX ORDER — DIPHENHYDRAMINE HYDROCHLORIDE 50 MG/ML
12.5 INJECTION, SOLUTION INTRAMUSCULAR; INTRAVENOUS AS NEEDED
Status: DISCONTINUED | OUTPATIENT
Start: 2021-10-14 | End: 2021-10-14 | Stop reason: HOSPADM

## 2021-10-14 RX ORDER — SUCCINYLCHOLINE CHLORIDE 20 MG/ML
INJECTION INTRAMUSCULAR; INTRAVENOUS AS NEEDED
Status: DISCONTINUED | OUTPATIENT
Start: 2021-10-14 | End: 2021-10-14 | Stop reason: HOSPADM

## 2021-10-14 RX ORDER — MIDAZOLAM HYDROCHLORIDE 1 MG/ML
INJECTION, SOLUTION INTRAMUSCULAR; INTRAVENOUS AS NEEDED
Status: DISCONTINUED | OUTPATIENT
Start: 2021-10-14 | End: 2021-10-14 | Stop reason: HOSPADM

## 2021-10-14 RX ORDER — SODIUM CHLORIDE 9 MG/ML
50 INJECTION, SOLUTION INTRAVENOUS CONTINUOUS
Status: DISCONTINUED | OUTPATIENT
Start: 2021-10-14 | End: 2021-10-14 | Stop reason: HOSPADM

## 2021-10-14 RX ORDER — SODIUM CHLORIDE 9 MG/ML
125 INJECTION, SOLUTION INTRAVENOUS CONTINUOUS
Status: DISCONTINUED | OUTPATIENT
Start: 2021-10-14 | End: 2021-10-15 | Stop reason: HOSPADM

## 2021-10-14 RX ORDER — POLYETHYLENE GLYCOL 3350 17 G/17G
17 POWDER, FOR SOLUTION ORAL DAILY
Status: DISCONTINUED | OUTPATIENT
Start: 2021-10-15 | End: 2021-10-15 | Stop reason: HOSPADM

## 2021-10-14 RX ORDER — MORPHINE SULFATE 2 MG/ML
2 INJECTION, SOLUTION INTRAMUSCULAR; INTRAVENOUS
Status: DISCONTINUED | OUTPATIENT
Start: 2021-10-14 | End: 2021-10-14 | Stop reason: HOSPADM

## 2021-10-14 RX ORDER — ONDANSETRON 2 MG/ML
4 INJECTION INTRAMUSCULAR; INTRAVENOUS AS NEEDED
Status: DISCONTINUED | OUTPATIENT
Start: 2021-10-14 | End: 2021-10-14 | Stop reason: HOSPADM

## 2021-10-14 RX ORDER — GLYCOPYRROLATE 0.2 MG/ML
INJECTION INTRAMUSCULAR; INTRAVENOUS AS NEEDED
Status: DISCONTINUED | OUTPATIENT
Start: 2021-10-14 | End: 2021-10-14 | Stop reason: HOSPADM

## 2021-10-14 RX ORDER — NALOXONE HYDROCHLORIDE 0.4 MG/ML
0.4 INJECTION, SOLUTION INTRAMUSCULAR; INTRAVENOUS; SUBCUTANEOUS AS NEEDED
Status: DISCONTINUED | OUTPATIENT
Start: 2021-10-14 | End: 2021-10-15 | Stop reason: HOSPADM

## 2021-10-14 RX ORDER — DEXAMETHASONE SODIUM PHOSPHATE 4 MG/ML
INJECTION, SOLUTION INTRA-ARTICULAR; INTRALESIONAL; INTRAMUSCULAR; INTRAVENOUS; SOFT TISSUE AS NEEDED
Status: DISCONTINUED | OUTPATIENT
Start: 2021-10-14 | End: 2021-10-14 | Stop reason: HOSPADM

## 2021-10-14 RX ORDER — DEXMEDETOMIDINE HYDROCHLORIDE 100 UG/ML
INJECTION, SOLUTION INTRAVENOUS AS NEEDED
Status: DISCONTINUED | OUTPATIENT
Start: 2021-10-14 | End: 2021-10-14 | Stop reason: HOSPADM

## 2021-10-14 RX ORDER — SODIUM CHLORIDE 0.9 % (FLUSH) 0.9 %
5-40 SYRINGE (ML) INJECTION EVERY 8 HOURS
Status: DISCONTINUED | OUTPATIENT
Start: 2021-10-14 | End: 2021-10-15 | Stop reason: HOSPADM

## 2021-10-14 RX ORDER — MIDAZOLAM HYDROCHLORIDE 1 MG/ML
0.5 INJECTION, SOLUTION INTRAMUSCULAR; INTRAVENOUS
Status: DISCONTINUED | OUTPATIENT
Start: 2021-10-14 | End: 2021-10-14 | Stop reason: HOSPADM

## 2021-10-14 RX ORDER — HYDROMORPHONE HYDROCHLORIDE 1 MG/ML
0.2 INJECTION, SOLUTION INTRAMUSCULAR; INTRAVENOUS; SUBCUTANEOUS
Status: DISCONTINUED | OUTPATIENT
Start: 2021-10-14 | End: 2021-10-14 | Stop reason: HOSPADM

## 2021-10-14 RX ORDER — SODIUM CHLORIDE 0.9 % (FLUSH) 0.9 %
5-40 SYRINGE (ML) INJECTION AS NEEDED
Status: DISCONTINUED | OUTPATIENT
Start: 2021-10-14 | End: 2021-10-15 | Stop reason: HOSPADM

## 2021-10-14 RX ORDER — FENTANYL CITRATE 50 UG/ML
50 INJECTION, SOLUTION INTRAMUSCULAR; INTRAVENOUS AS NEEDED
Status: DISCONTINUED | OUTPATIENT
Start: 2021-10-14 | End: 2021-10-14 | Stop reason: HOSPADM

## 2021-10-14 RX ORDER — FENTANYL CITRATE 50 UG/ML
INJECTION, SOLUTION INTRAMUSCULAR; INTRAVENOUS AS NEEDED
Status: DISCONTINUED | OUTPATIENT
Start: 2021-10-14 | End: 2021-10-14 | Stop reason: HOSPADM

## 2021-10-14 RX ORDER — PHENYLEPHRINE HCL IN 0.9% NACL 0.4MG/10ML
SYRINGE (ML) INTRAVENOUS AS NEEDED
Status: DISCONTINUED | OUTPATIENT
Start: 2021-10-14 | End: 2021-10-14 | Stop reason: HOSPADM

## 2021-10-14 RX ORDER — AMOXICILLIN 250 MG
1 CAPSULE ORAL 2 TIMES DAILY
Status: DISCONTINUED | OUTPATIENT
Start: 2021-10-14 | End: 2021-10-15 | Stop reason: HOSPADM

## 2021-10-14 RX ORDER — SODIUM CHLORIDE 0.9 % (FLUSH) 0.9 %
5-40 SYRINGE (ML) INJECTION EVERY 8 HOURS
Status: DISCONTINUED | OUTPATIENT
Start: 2021-10-14 | End: 2021-10-14 | Stop reason: HOSPADM

## 2021-10-14 RX ORDER — MIDAZOLAM HYDROCHLORIDE 1 MG/ML
1 INJECTION, SOLUTION INTRAMUSCULAR; INTRAVENOUS AS NEEDED
Status: DISCONTINUED | OUTPATIENT
Start: 2021-10-14 | End: 2021-10-14 | Stop reason: HOSPADM

## 2021-10-14 RX ORDER — OXYCODONE HYDROCHLORIDE 5 MG/1
5 TABLET ORAL
Status: DISCONTINUED | OUTPATIENT
Start: 2021-10-14 | End: 2021-10-15 | Stop reason: HOSPADM

## 2021-10-14 RX ORDER — LIDOCAINE HYDROCHLORIDE 10 MG/ML
0.1 INJECTION, SOLUTION EPIDURAL; INFILTRATION; INTRACAUDAL; PERINEURAL AS NEEDED
Status: DISCONTINUED | OUTPATIENT
Start: 2021-10-14 | End: 2021-10-14 | Stop reason: HOSPADM

## 2021-10-14 RX ORDER — OXYCODONE HYDROCHLORIDE 5 MG/1
10 TABLET ORAL
Status: DISCONTINUED | OUTPATIENT
Start: 2021-10-14 | End: 2021-10-15 | Stop reason: HOSPADM

## 2021-10-14 RX ORDER — HYDROMORPHONE HYDROCHLORIDE 2 MG/ML
INJECTION, SOLUTION INTRAMUSCULAR; INTRAVENOUS; SUBCUTANEOUS AS NEEDED
Status: DISCONTINUED | OUTPATIENT
Start: 2021-10-14 | End: 2021-10-14 | Stop reason: HOSPADM

## 2021-10-14 RX ORDER — FENTANYL CITRATE 50 UG/ML
25 INJECTION, SOLUTION INTRAMUSCULAR; INTRAVENOUS
Status: DISCONTINUED | OUTPATIENT
Start: 2021-10-14 | End: 2021-10-14 | Stop reason: HOSPADM

## 2021-10-14 RX ORDER — LIDOCAINE HYDROCHLORIDE 20 MG/ML
INJECTION, SOLUTION EPIDURAL; INFILTRATION; INTRACAUDAL; PERINEURAL AS NEEDED
Status: DISCONTINUED | OUTPATIENT
Start: 2021-10-14 | End: 2021-10-14 | Stop reason: HOSPADM

## 2021-10-14 RX ORDER — ONDANSETRON 4 MG/1
4 TABLET, ORALLY DISINTEGRATING ORAL
Status: DISCONTINUED | OUTPATIENT
Start: 2021-10-14 | End: 2021-10-15 | Stop reason: HOSPADM

## 2021-10-14 RX ORDER — OXYCODONE AND ACETAMINOPHEN 5; 325 MG/1; MG/1
1 TABLET ORAL AS NEEDED
Status: DISCONTINUED | OUTPATIENT
Start: 2021-10-14 | End: 2021-10-14 | Stop reason: HOSPADM

## 2021-10-14 RX ORDER — ONDANSETRON 2 MG/ML
INJECTION INTRAMUSCULAR; INTRAVENOUS AS NEEDED
Status: DISCONTINUED | OUTPATIENT
Start: 2021-10-14 | End: 2021-10-14 | Stop reason: HOSPADM

## 2021-10-14 RX ORDER — PROPOFOL 10 MG/ML
INJECTION, EMULSION INTRAVENOUS AS NEEDED
Status: DISCONTINUED | OUTPATIENT
Start: 2021-10-14 | End: 2021-10-14 | Stop reason: HOSPADM

## 2021-10-14 RX ORDER — PHENYLEPHRINE HCL IN 0.9% NACL 0.4MG/10ML
SYRINGE (ML) INTRAVENOUS
Status: DISCONTINUED | OUTPATIENT
Start: 2021-10-14 | End: 2021-10-14

## 2021-10-14 RX ORDER — ACETAMINOPHEN 325 MG/1
650 TABLET ORAL EVERY 6 HOURS
Status: DISCONTINUED | OUTPATIENT
Start: 2021-10-14 | End: 2021-10-15 | Stop reason: HOSPADM

## 2021-10-14 RX ADMIN — SODIUM CHLORIDE 125 ML/HR: 9 INJECTION, SOLUTION INTRAVENOUS at 14:52

## 2021-10-14 RX ADMIN — ROCURONIUM BROMIDE 25 MG: 10 SOLUTION INTRAVENOUS at 12:32

## 2021-10-14 RX ADMIN — WATER 2 G: 1 INJECTION INTRAMUSCULAR; INTRAVENOUS; SUBCUTANEOUS at 20:26

## 2021-10-14 RX ADMIN — HYDROMORPHONE HYDROCHLORIDE 0.25 MG: 2 INJECTION, SOLUTION INTRAMUSCULAR; INTRAVENOUS; SUBCUTANEOUS at 13:53

## 2021-10-14 RX ADMIN — SUCCINYLCHOLINE CHLORIDE 160 MG: 20 INJECTION, SOLUTION INTRAMUSCULAR; INTRAVENOUS at 12:19

## 2021-10-14 RX ADMIN — PROPOFOL 25 MG: 10 INJECTION, EMULSION INTRAVENOUS at 14:04

## 2021-10-14 RX ADMIN — GLYCOPYRROLATE 0.1 MG: 0.2 INJECTION, SOLUTION INTRAMUSCULAR; INTRAVENOUS at 13:05

## 2021-10-14 RX ADMIN — DEXMEDETOMIDINE HYDROCHLORIDE 8 MCG: 100 INJECTION, SOLUTION, CONCENTRATE INTRAVENOUS at 13:51

## 2021-10-14 RX ADMIN — Medication 15 MCG/MIN: at 12:44

## 2021-10-14 RX ADMIN — DEXMEDETOMIDINE HYDROCHLORIDE 7 MCG: 100 INJECTION, SOLUTION, CONCENTRATE INTRAVENOUS at 14:04

## 2021-10-14 RX ADMIN — SUGAMMADEX 169 MG: 100 INJECTION, SOLUTION INTRAVENOUS at 13:47

## 2021-10-14 RX ADMIN — FENTANYL CITRATE 50 MCG: 50 INJECTION, SOLUTION INTRAMUSCULAR; INTRAVENOUS at 12:41

## 2021-10-14 RX ADMIN — Medication 80 MCG: at 12:24

## 2021-10-14 RX ADMIN — ROCURONIUM BROMIDE 10 MG: 10 SOLUTION INTRAVENOUS at 13:20

## 2021-10-14 RX ADMIN — FENTANYL CITRATE 50 MCG: 50 INJECTION, SOLUTION INTRAMUSCULAR; INTRAVENOUS at 12:19

## 2021-10-14 RX ADMIN — OXYCODONE 5 MG: 5 TABLET ORAL at 20:29

## 2021-10-14 RX ADMIN — DOCUSATE SODIUM 50 MG AND SENNOSIDES 8.6 MG 1 TABLET: 8.6; 5 TABLET, FILM COATED ORAL at 17:29

## 2021-10-14 RX ADMIN — WATER 2 G: 1 INJECTION INTRAMUSCULAR; INTRAVENOUS; SUBCUTANEOUS at 12:30

## 2021-10-14 RX ADMIN — ACETAMINOPHEN 650 MG: 325 TABLET ORAL at 17:28

## 2021-10-14 RX ADMIN — DEXAMETHASONE SODIUM PHOSPHATE 8 MG: 4 INJECTION, SOLUTION INTRAMUSCULAR; INTRAVENOUS at 12:32

## 2021-10-14 RX ADMIN — SODIUM CHLORIDE, POTASSIUM CHLORIDE, SODIUM LACTATE AND CALCIUM CHLORIDE 75 ML/HR: 600; 310; 30; 20 INJECTION, SOLUTION INTRAVENOUS at 09:50

## 2021-10-14 RX ADMIN — MIDAZOLAM 2 MG: 1 INJECTION INTRAMUSCULAR; INTRAVENOUS at 12:10

## 2021-10-14 RX ADMIN — DEXMEDETOMIDINE HYDROCHLORIDE 5 MCG: 100 INJECTION, SOLUTION, CONCENTRATE INTRAVENOUS at 13:11

## 2021-10-14 RX ADMIN — ROCURONIUM BROMIDE 5 MG: 10 SOLUTION INTRAVENOUS at 12:19

## 2021-10-14 RX ADMIN — OXYCODONE 10 MG: 5 TABLET ORAL at 17:28

## 2021-10-14 RX ADMIN — ROCURONIUM BROMIDE 10 MG: 10 SOLUTION INTRAVENOUS at 12:45

## 2021-10-14 RX ADMIN — LIDOCAINE HYDROCHLORIDE 80 MG: 20 INJECTION, SOLUTION EPIDURAL; INFILTRATION; INTRACAUDAL; PERINEURAL at 12:19

## 2021-10-14 RX ADMIN — Medication 40 MCG: at 12:36

## 2021-10-14 RX ADMIN — HYDROMORPHONE HYDROCHLORIDE 0.5 MG: 2 INJECTION, SOLUTION INTRAMUSCULAR; INTRAVENOUS; SUBCUTANEOUS at 12:10

## 2021-10-14 RX ADMIN — ONDANSETRON HYDROCHLORIDE 4 MG: 2 INJECTION, SOLUTION INTRAMUSCULAR; INTRAVENOUS at 13:47

## 2021-10-14 RX ADMIN — Medication 10 ML: at 20:29

## 2021-10-14 RX ADMIN — PROPOFOL 200 MG: 10 INJECTION, EMULSION INTRAVENOUS at 12:19

## 2021-10-14 NOTE — ANESTHESIA POSTPROCEDURE EVALUATION
Procedure(s):  ANTERIOR CERVICAL DISCECTOMY AND FUSION C3-4 WITH GLOBUS COALATION SPACER AND I FACTOR GRAFT. general    Anesthesia Post Evaluation      Multimodal analgesia: multimodal analgesia used between 6 hours prior to anesthesia start to PACU discharge  Patient location during evaluation: PACU  Patient participation: complete - patient participated  Level of consciousness: awake and alert  Pain management: adequate  Airway patency: patent  Anesthetic complications: no  Cardiovascular status: acceptable  Respiratory status: acceptable  Hydration status: acceptable  Comments: Seen, no complaints   Post anesthesia nausea and vomiting:  none  Final Post Anesthesia Temperature Assessment:  Normothermia (36.0-37.5 degrees C)      INITIAL Post-op Vital signs:   Vitals Value Taken Time   /92 10/14/21 1445   Temp 36.1 °C (96.9 °F) 10/14/21 1421   Pulse 81 10/14/21 1455   Resp 19 10/14/21 1455   SpO2 97 % 10/14/21 1455   Vitals shown include unvalidated device data.

## 2021-10-14 NOTE — ROUTINE PROCESS
Primary Nurse Mahesh Gabriel and isidro  , RN performed a dual skin assessment on this patient No impairment noted  Janes score is 21

## 2021-10-14 NOTE — ROUTINE PROCESS
Patient: Nallely Sandra MRN: [id number]  SSN: [id number]   YOB: 1955  Age: 76 y.o. Sex: female     Patient is status post Procedure(s):  ANTERIOR CERVICAL DISCECTOMY AND FUSION C3-4 WITH GLOBUS COALATION SPACER AND I FACTOR GRAFT.     Surgeon(s) and Role:     * Eleni Yanez MD - Primary                  Peripheral IV 10/14/21 Left;Posterior Hand (Active)              Dressing/Packing:  Incision 10/14/21 Neck anterior-Dressing/Treatment: Surgical glue (10/14/21 1354)    Splint/Cast:  ]

## 2021-10-14 NOTE — PROGRESS NOTES
1530: Rodrigues dcd per order. Pt tolerated well. 1550: TRANSFER - OUT REPORT:    Verbal report given to Florencio Cardenas RN(name) on Delsie Harada  being transferred to [facility](unit) for routine post - op       Report consisted of patients Situation, Background, Assessment and   Recommendations(SBAR). Time Pre op antibiotic given:1230  Anesthesia Stop time: 7360  Rodrigues Present on Transfer to floor:no  Order for Rodrigues on Chart:no  Discharge Prescriptions with Chart:no    Information from the following report(s) SBAR, Kardex, OR Summary, Intake/Output, MAR and Cardiac Rhythm SR was reviewed with the receiving nurse. Opportunity for questions and clarification was provided. Is the patient on 02? NO        Is the patient on a monitor? NO    Is the nurse transporting with the patient? NO    Surgical Waiting Area notified of patient's transfer from PACU? YES      The following personal items collected during your admission accompanied patient upon transfer:   Dental Appliance: Dental Appliances: None  Vision:    Hearing Aid:    Jewelry:    Clothing: Clothing: With patient  Other Valuables:  Other Valuables: Eyeglasses, With patient  Valuables sent to safe:

## 2021-10-14 NOTE — ANESTHESIA PREPROCEDURE EVALUATION
Anesthetic History   No history of anesthetic complications            Review of Systems / Medical History  Patient summary reviewed, nursing notes reviewed and pertinent labs reviewed    Pulmonary        Sleep apnea: CPAP           Neuro/Psych         Psychiatric history    Comments: Carpal tunnel syndrome of left wrist (G56.02)  Anxiety disorder (F41.9)    Scoliosis (M41.9)     Cardiovascular              Hyperlipidemia    Exercise tolerance: >4 METS     GI/Hepatic/Renal  Within defined limits              Endo/Other      Hypothyroidism  Obesity and arthritis     Other Findings              Physical Exam    Airway  Mallampati: II  TM Distance: 4 - 6 cm  Neck ROM: normal range of motion   Mouth opening: Normal     Cardiovascular  Regular rate and rhythm,  S1 and S2 normal,  no murmur, click, rub, or gallop  Rhythm: regular  Rate: normal         Dental  No notable dental hx       Pulmonary  Breath sounds clear to auscultation               Abdominal  GI exam deferred       Other Findings            Anesthetic Plan    ASA: 3  Anesthesia type: general          Induction: Intravenous  Anesthetic plan and risks discussed with: Patient

## 2021-10-15 VITALS
TEMPERATURE: 98.2 F | RESPIRATION RATE: 16 BRPM | OXYGEN SATURATION: 96 % | HEIGHT: 66 IN | BODY MASS INDEX: 29.97 KG/M2 | SYSTOLIC BLOOD PRESSURE: 127 MMHG | DIASTOLIC BLOOD PRESSURE: 75 MMHG | HEART RATE: 75 BPM | WEIGHT: 186.51 LBS

## 2021-10-15 PROCEDURE — 74011250636 HC RX REV CODE- 250/636: Performed by: NEUROLOGICAL SURGERY

## 2021-10-15 PROCEDURE — 74011250637 HC RX REV CODE- 250/637: Performed by: NURSE PRACTITIONER

## 2021-10-15 PROCEDURE — 74011250637 HC RX REV CODE- 250/637: Performed by: NEUROLOGICAL SURGERY

## 2021-10-15 PROCEDURE — 99218 HC RM OBSERVATION: CPT

## 2021-10-15 PROCEDURE — 74011000250 HC RX REV CODE- 250: Performed by: NEUROLOGICAL SURGERY

## 2021-10-15 RX ORDER — OXYCODONE HYDROCHLORIDE 5 MG/1
5 TABLET ORAL
Qty: 12 TABLET | Refills: 0 | Status: SHIPPED | OUTPATIENT
Start: 2021-10-15 | End: 2021-10-18

## 2021-10-15 RX ORDER — ACETAMINOPHEN 325 MG/1
650 TABLET ORAL EVERY 6 HOURS
Qty: 10 TABLET | Refills: 0 | Status: SHIPPED
Start: 2021-10-15

## 2021-10-15 RX ORDER — BUPROPION HYDROCHLORIDE 150 MG/1
150 TABLET, EXTENDED RELEASE ORAL 2 TIMES DAILY
Status: DISCONTINUED | OUTPATIENT
Start: 2021-10-15 | End: 2021-10-15 | Stop reason: HOSPADM

## 2021-10-15 RX ORDER — LANOLIN ALCOHOL/MO/W.PET/CERES
1000 CREAM (GRAM) TOPICAL
Status: DISCONTINUED | OUTPATIENT
Start: 2021-10-15 | End: 2021-10-15 | Stop reason: HOSPADM

## 2021-10-15 RX ORDER — SPIRONOLACTONE 25 MG/1
50 TABLET ORAL
Status: DISCONTINUED | OUTPATIENT
Start: 2021-10-15 | End: 2021-10-15 | Stop reason: HOSPADM

## 2021-10-15 RX ORDER — MELATONIN
2000 2 TIMES DAILY
Status: DISCONTINUED | OUTPATIENT
Start: 2021-10-15 | End: 2021-10-15 | Stop reason: HOSPADM

## 2021-10-15 RX ORDER — ROSUVASTATIN CALCIUM 10 MG/1
10 TABLET, COATED ORAL
Status: DISCONTINUED | OUTPATIENT
Start: 2021-10-15 | End: 2021-10-15 | Stop reason: HOSPADM

## 2021-10-15 RX ORDER — SERTRALINE HYDROCHLORIDE 50 MG/1
150 TABLET, FILM COATED ORAL
Status: DISCONTINUED | OUTPATIENT
Start: 2021-10-15 | End: 2021-10-15 | Stop reason: HOSPADM

## 2021-10-15 RX ADMIN — Medication 10 ML: at 06:03

## 2021-10-15 RX ADMIN — OXYCODONE 5 MG: 5 TABLET ORAL at 09:52

## 2021-10-15 RX ADMIN — WATER 2 G: 1 INJECTION INTRAMUSCULAR; INTRAVENOUS; SUBCUTANEOUS at 06:02

## 2021-10-15 RX ADMIN — Medication 2000 UNITS: at 08:16

## 2021-10-15 RX ADMIN — ACETAMINOPHEN 650 MG: 325 TABLET ORAL at 06:04

## 2021-10-15 RX ADMIN — ACETAMINOPHEN 650 MG: 325 TABLET ORAL at 01:32

## 2021-10-15 RX ADMIN — ACETAMINOPHEN 650 MG: 325 TABLET ORAL at 12:28

## 2021-10-15 RX ADMIN — POLYETHYLENE GLYCOL 3350 17 G: 17 POWDER, FOR SOLUTION ORAL at 08:15

## 2021-10-15 RX ADMIN — DOCUSATE SODIUM 50 MG AND SENNOSIDES 8.6 MG 1 TABLET: 8.6; 5 TABLET, FILM COATED ORAL at 08:16

## 2021-10-15 RX ADMIN — OXYCODONE 5 MG: 5 TABLET ORAL at 01:32

## 2021-10-15 NOTE — DISCHARGE INSTRUCTIONS
Andrae Estrella M.D. What can I do?  The only exercise you should do is walking. Start by walking twice a day for 5 minutes, then increase by  2-3 minutes every day until you reach 25 minutes twice a day. DO NOT sit for long periods of time (20 minutes at a time for the first couple of weeks, then gradually increase.  No heavy lifting (5 lbs max); no straining, twisting, or bending.  No driving until your physician tells you it is ok. It is, however, ok to ride short distances in a car.  If you are required to wear a back brace, you may remove it when you are sleeping unless your doctor has advised against it. What can I eat?  You may resume your regular home diet as tolerated. If your throat is sore, you may want to eat soft food for the first few days. When can I take a shower?  You may shower 48 hours after surgery.  Do not take baths or soak in pools. Medications:   Check with your physician before taking any anti-inflammatory medicines (Advil, Aleve, ibuprofen, aspirin).  Take prescription medicine as directed. DO NOT take more than prescribed. Call your physician if the prescribed dose is not sufficiently controlling your pain.  It is important to have regular bowel movements. Pain medications may cause constipation. Drink plenty of fluids, get enough fiber in your diet, and use stool softeners and drink prune juice to help prevent constipation. Do not take laxatives if at all possible except in severe situations. It can result in a vicious cycle of constipation and diarrhea.  Do not put any ointments or creams on your incision unless directed to do so by your physician.  Tobacco products should be avoided during the postoperative phase. When do I see the physician again?  Please call your physicians office as soon as you get home to schedule your 1st post operative appointment.   You should be seen approximately two weeks after your surgery.  If you had a fusion, you will need to get an order for xrays to be taken prior to the six week appointment. These should be done on the day of the appointment or 1-2 days before. NOTIFY YOUR PHYSICIAN OF ANY OF THE FOLLOWING:     Fever above 101º for 24 hrs.  Nausea or vomiting.  Inability to urinate   Loss of bowel or bladder function (sudden onset of incontinence)   Changes in sensation (numbness, tingling, color change) in your extremities   Pain not relieved by your medicine.    Redness, swelling or drainage from your incision   Persistent pain in the calf of either leg   Development of severe pain      FOR APPOINTMENTS OR QUESTIONS CALL:    [facility] [address] [place] [address]  [phone]

## 2021-10-15 NOTE — OP NOTES
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 PROCEDURE PERFORMED:  One level anterior cervical diskectomy and fusion C3-4 with 0 profile biomechanical implant graft and i-FACTOR synthetic bone graft. SURGEON:  Cynthia Lovelace MD    ASSISTANT:      ANESTHESIA:  General.    COMPLICATIONS:  None. SPECIMENS REMOVED:  None sent. IMPLANTS:  Zero-profile 6-mm high coalition graft, Globus Spine System with i-FACTOR synthetic bone graft. ESTIMATED BLOOD LOSS:  Minimal.    OPERATIVE PROCEDURE:  Review of imaging studies in this patient with myelopathic findings was noted to have severe cord compression at C3-4 and while the radiologist reported other levels having mild-to-moderate stenosis, this was really the offending level in my opinion. I had discussed with the patient and her family that if she has no benefit or continues to progress after an anterior approach at the C3-4 level, she may need a posterior approach where we can do a decompressive laminectomy. She agreed to proceed. She was taken to the operating room where she was induced under general endotracheal anesthesia, placed on the operating table in the supine position. A shoulder roll placed beneath both shoulders. The anterior cervical region scrubbed, prepped and draped in the usual sterile fashion. Horizontal incision extending from the midline over the anterior border of sternocleidomastoid on the right side was made and an avascular plane was developed down to the anterior cervical spine. A blunted needle placed in the disk space. An x-ray obtained and was noted to be at C4-5, so attention was directed to the level above.   I had to separate the longus colli muscles to expose the disk space at C3-4. There was a large anterior osteophyte that had to be removed. A blunted needle placed in the interspace and an x-ray obtained to confirm position at C3-4. Haider Mandril posts were placed in the C3 and C4 vertebral bodies respectively. The interspace was distracted under loupe magnification with self-retaining retractors into the longus colli muscles. Disk material was removed with pituitary rongeurs and curettes down to the posterior ligament. The large posterior osteophytes were removed with Kerrison rongeurs to expose the ligament better. The ligament was opened with a 1-mm Kerrison and extended laterally in both directions to decompress the central canal as well as the nerve roots. A sharp nerve hook also had to be used in order to open the posterior ligament as well. Fragments of disk material from behind the vertebral bodies were retrieved with pituitary rongeurs until no other fragments were noted to be compressing the central canal and the ligament was normal compressing as well. The endplates of the vertebral bodies were scraped with a curette. A 6-mm high 0 profile graft from the Globus spine system was chosen, placed in the interspace, countersunk 1-2 mm. Synthetic bone graft was placed in the center of the spacer. Spacer was then secured to the vertebral body above and below with a 12-mm screw at each site and locked down. An x-ray was obtained to confirm position. The Sharon were removed. FloSeal placed in their sites. The incision was inspected, irrigated copiously with antibiotic irrigation and then closed in layers. Dermabond was applied to the skin edges. The needle, sponge, and instrument count were correct at the end of the procedure.         MD ROBERT Gray/[id number] DEL ROSARIOIAS_P  D:  10/14/2021 14:51  T:  10/14/2021 22:50  JOB #:  [id number]  CC:  Calton Ahumada, MD

## 2021-10-15 NOTE — DISCHARGE SUMMARY
Discharge Summary     Patient ID:  Kostas Power  [id number]   47 y.o.  1955    Admit date: 10/14/2021    Discharge Date: 10/15/2021      Admitting Physician: Andres Marte MD     Discharge Physician: Kathya Griffith NP    Admission Diagnoses: Status post surgery [Z98.890]    Last Procedure: Procedure(s):  ANTERIOR CERVICAL DISCECTOMY AND FUSION C3-4 WITH GLOBUS COALATION SPACER AND I FACTOR GRAFT    Discharge Diagnoses: Active Problems:    Status post surgery (10/14/2021)         Consults: None    Significant Diagnostic Studies: None    Patient condition upon discharge: Stable    Hospital Course: The patient was referred to Dr. Wendy Guidry for early signs of myelopathy from her neurologist. She had weakness in her BUE as well as numbness and tingling in her hands and electric shock-like sensations when she flexed or extended her head. She had evidence of a disc herniation at C3-4. After discussing risks, benefits, and alternatives, she decided to proceed. She underwent an ACDF at the C3/4 level on 10/14/21 with Dr. Wendy Guidry. Intraoperative findings can be found in his operative report. Post-operatively, the patient transferred to the spine floor. She was taking PO well, voiding on her own, and ambulating without assistance. Her pain was well-controlled. She was afebrile and vital signs were stable. She is ready for d/c to home on POD1. She already has a follow-up appointment scheduled with Dr. Wendy Guidry. Discussed signs and symptoms of what to look for post-op and when to call the office. Disposition: Home    Patient Instructions:   Current Discharge Medication List      START taking these medications    Details   acetaminophen (TYLENOL) 325 mg tablet Take 2 Tablets by mouth every six (6) hours. Qty: 10 Tablet, Refills: 0      oxyCODONE IR (ROXICODONE) 5 mg immediate release tablet Take 1 Tablet by mouth every four (4) hours as needed for Pain for up to 3 days.  Max Daily Amount: 30 mg.  Qty: 12 Tablet, Refills: 0    Associated Diagnoses: S/P cervical spinal fusion         CONTINUE these medications which have NOT CHANGED    Details   cyanocobalamin (Vitamin B-12) 1,000 mcg tablet Take 1,000 mcg by mouth nightly. buPROPion XL (WELLBUTRIN XL) 300 mg XL tablet Take 1 Tablet by mouth daily. Qty: 90 Tablet, Refills: 1    Associated Diagnoses: ENIO (generalized anxiety disorder); Depression with anxiety; Tobacco dependence      rosuvastatin (CRESTOR) 10 mg tablet TAKE 1 TABLET AT NIGHT  Qty: 90 Tablet, Refills: 1    Associated Diagnoses: Hypercholesteremia      sertraline (ZOLOFT) 100 mg tablet TAKE 1 & 1/2 TABLETS DAILY  Qty: 135 Tablet, Refills: 1    Associated Diagnoses: Recurrent major depressive disorder, in partial remission (HCC); ENIO (generalized anxiety disorder)      spironolactone (ALDACTONE) 50 mg tablet Take 50 mg by mouth nightly. CALCIUM PO Take  by mouth daily. cinnamon bark (CINNAMON) 500 mg cap Take 2 Tablets by mouth nightly. cholecalciferol (VITAMIN D3) 1,000 unit tablet Take 2,000 Units by mouth two (2) times a day. alendronate (FOSAMAX) 35 mg tablet Take 1 Tablet by mouth every seven (7) days. Qty: 12 Tablet, Refills: 1      ammonium lactate (AMLACTIN) 12 % topical cream       FEXOFENADINE HCL (ALLEGRA PO) Take 1 Tab by mouth daily as needed. STOP taking these medications       mupirocin (BACTROBAN) 2 % ointment Comments:   Reason for Stopping:         LORazepam (ATIVAN) 1 mg tablet Comments:   Reason for Stopping:         naproxen sodium (ALEVE PO) Comments:   Reason for Stopping:         ibuprofen (MOTRIN) 200 mg tablet Comments:   Reason for Stopping:               Diet: Reference my discharge instructions. Activity: Reference my discharge instructions. EXAM:   A&Ox3. Speech clear. Affect normal.  ELLIS spontaneously. BUE strength 5/5 except hand intrinsics 5-/5. BLE 5/5. Sensation intact  Gait deferred.   Anterior neck incision C/D/I with dermabond in place. No erythema, edema, or drainage. Ext no edema or pain. Total time discharging patient took greater than 30 minutes.     Signed:  Parris Toro NP  October 15, 2021  [id number]

## 2021-10-15 NOTE — PROGRESS NOTES
Transition of Care Plan   RUR- Observation    DISPOSITION: The disposition plan is home with family assistance; pending medical progression   F/U with PCP/Specialist     Transport: Spouse     Reason for Admission:  S/P surgery     Plan for utilizing home health:      No skilled needs at this time     PCP: First and Last name:  Jeanine Villagomez MD     Name of Practice:    Are you a current patient: Yes/No: Yes    Approximate date of last visit: a month ago    Can you participate in a virtual visit with your PCP: yes                     Current Advanced Directive/Advance Care Plan: No Order      Healthcare Decision Maker:   Click here to complete [address] including selection of the Healthcare Decision Maker Relationship (ie \"Primary\")             Primary Decision MakerFlorene Orf Spouse - [phone]    Secondary Decision Maker: Belén Handy - Son    Secondary Decision Maker: Pippaafsaneh SantosHarrison - Son                  Transition of Care Plan: This cm met the pt and her spouse at bedside to conduct the initial evaluation. The pt presented as aox4. The pt confirmed her demographics and insurance provider. The pt's preferred pharmacy is [address]. The pt reported that she is independent with ADLs and IADLs. The pt has a good social support system. The pt did not express any financial or social barriers at discharge. This cm will continue to follow for SHUN needs. Care Management Interventions  PCP Verified by CM: Yes  Mode of Transport at Discharge:  Other (see comment) ()  Transition of Care Consult (CM Consult): Discharge Planning  MyChart Signup: Yes  Discharge Durable Medical Equipment: No (CPAP)  Physical Therapy Consult: No  Occupational Therapy Consult: No  Speech Therapy Consult: No  Support Systems: Spouse/Significant Other, Child(bisi), Other Family Member(s)  Confirm Follow Up Transport: Self  Discharge Location  Discharge Placement: Home (The pt lives with her spouse in a 2 level home. )     Observation notice provided in writing to patient and/or caregiver as well as verbal explanation of the policy. Patients who are in outpatient status also receive the Observation notice. Patient has received notice and or patient representative has received via secure email, fax, or certified mail based on patient representative's preference.      CM: [address] [place],   [phone]

## 2021-11-22 ENCOUNTER — OFFICE VISIT (OUTPATIENT)
Dept: INTERNAL MEDICINE CLINIC | Age: 66
End: 2021-11-22
Payer: COMMERCIAL

## 2021-11-22 VITALS
HEART RATE: 82 BPM | RESPIRATION RATE: 16 BRPM | SYSTOLIC BLOOD PRESSURE: 120 MMHG | DIASTOLIC BLOOD PRESSURE: 68 MMHG | OXYGEN SATURATION: 97 % | BODY MASS INDEX: 29.92 KG/M2 | HEIGHT: 66 IN | TEMPERATURE: 97.1 F | WEIGHT: 186.2 LBS

## 2021-11-22 DIAGNOSIS — R79.89 ELEVATED TSH: ICD-10-CM

## 2021-11-22 DIAGNOSIS — M54.2 NECK PAIN: ICD-10-CM

## 2021-11-22 DIAGNOSIS — R53.83 FATIGUE, UNSPECIFIED TYPE: Primary | ICD-10-CM

## 2021-11-22 PROCEDURE — 99214 OFFICE O/P EST MOD 30 MIN: CPT | Performed by: NURSE PRACTITIONER

## 2021-11-22 NOTE — PROGRESS NOTES
Annabelle Martinez is a 76 y.o. female who was seen in clinic today (11/22/2021). Assessment & Plan:   1. Fatigue, unspecified type  Comments:  Etiology not clear-possibly just recovering from surgery. Previous TSH slightly elevated. Labs ordered. Orders:  -     METABOLIC PANEL, COMPREHENSIVE; Future  -     CBC WITH AUTOMATED DIFF; Future  -     TSH 3RD GENERATION; Future  2. Neck pain  Comments:  No acute red flag findings. Very minimal swelling. Small posterior cervical lymph node-possibly reactive. Labs ordered. Neurosurgery called to make aware changes  3. Elevated TSH  -     TSH 3RD GENERATION; Future    Reviewed red flags with pt. Total time 34 minutes including counseling, reassurance of concerns, and education. Neurosurgery office will be reaching out to her with follow up. Follow-up and Dispositions    · Return if symptoms worsen or fail to improve-pending lab results. Subjective:   Uche Elliott was seen today for Neck Pain  Pt had anterior cervical discectomy and fusion C3-4 with globus coalation spacer and I factor graft on 10/14/2021. Pt was seen by neurosurgery once since her hospital discharge-scheduled to see them again on 12/1/2021. She is concerned because she has been having some increased swelling right side of neck with some localized discomfort right neck and right side upper back which she was not having before. She is still having some numbness/tingling BUE-but as compared to prior to surgery maybe some better. Complaining of increased fatigue-her  says she is \"sleeping all the time\". Admits decreased stamina with some mild SOB with exertion. Mild right ear discomfort. Denies change in hearing. Denies fevers, chills, HA, dizziness, sore throat, congestion, CP, GI/ complaints, myalgias, weakness, rash or skin changes.       Brief Labs:     Lab Results   Component Value Date/Time    Sodium 139 10/08/2021 09:46 AM    Potassium 4.7 10/08/2021 09:46 AM    Creatinine 1.36 10/08/2021 09:46 AM    Cholesterol, total 162 09/28/2020 01:25 PM    HDL Cholesterol 67 09/28/2020 01:25 PM    LDL, calculated 74 09/28/2020 01:25 PM    Triglyceride 121 09/28/2020 01:25 PM    Hemoglobin A1c 6.1 08/09/2021 12:00 AM    TSH 5.590 08/09/2021 12:00 AM          Prior to Admission medications    Medication Sig Start Date End Date Taking? Authorizing Provider   acetaminophen (TYLENOL) 325 mg tablet Take 2 Tablets by mouth every six (6) hours. 10/15/21  Yes Silvestre Henson NP   cyanocobalamin (Vitamin B-12) 1,000 mcg tablet Take 1,000 mcg by mouth nightly. 9/27/21  Yes Casey Rahman MD   buPROPion XL (WELLBUTRIN XL) 300 mg XL tablet Take 1 Tablet by mouth daily. Patient taking differently: Take 300 mg by mouth nightly. 9/24/21  Yes Casey Rahman MD   alendronate (FOSAMAX) 35 mg tablet Take 1 Tablet by mouth every seven (7) days. Patient taking differently: Take 35 mg by mouth every seven (7) days. TAKES EVERY SUNDAY 9/24/21  Yes Casey Rahman MD   rosuvastatin (CRESTOR) 10 mg tablet TAKE 1 TABLET AT NIGHT 8/27/21  Yes Casey Rahman MD   sertraline (ZOLOFT) 100 mg tablet TAKE 1 & 1/2 TABLETS DAILY  Patient taking differently: Take 150 mg by mouth nightly. TAKE 1 & 1/2 TABLETS DAILY 7/19/21  Yes Casey Rahman MD   ammonium lactate (AMLACTIN) 12 % topical cream  4/23/21  Yes Provider, Historical   spironolactone (ALDACTONE) 50 mg tablet Take 50 mg by mouth nightly. 6/18/21  Yes Provider, Historical   CALCIUM PO Take  by mouth daily. Yes Provider, Historical   FEXOFENADINE HCL (ALLEGRA PO) Take 1 Tab by mouth daily as needed. Yes Provider, Historical   cholecalciferol (VITAMIN D3) 1,000 unit tablet Take 2,000 Units by mouth two (2) times a day. Yes Provider, Historical   cinnamon bark (CINNAMON) 500 mg cap Take 2 Tablets by mouth nightly.   11/22/21  Provider, Historical          Allergies   Allergen Reactions    Keflex [Cephalexin] Nausea Only    Nitrofurantoin Monohyd/M-Cryst Nausea Only           ROS - per HPI    Objective:   Physical Exam  Vitals reviewed. Constitutional:       General: She is not in acute distress. Appearance: She is well-groomed. Comments: Appears tired   HENT:      Right Ear: Tympanic membrane, ear canal and external ear normal.      Left Ear: Tympanic membrane, ear canal and external ear normal.   Eyes:      General: No scleral icterus. Neck:      Thyroid: No thyromegaly. Vascular: No JVD. Cardiovascular:      Rate and Rhythm: Normal rate and regular rhythm. Pulses: Normal pulses. Heart sounds: Normal heart sounds, S1 normal and S2 normal.   Pulmonary:      Effort: Pulmonary effort is normal. No respiratory distress. Breath sounds: Normal breath sounds. No wheezing, rhonchi or rales. Chest:   Breasts:      Right: No supraclavicular adenopathy. Left: No supraclavicular adenopathy. Musculoskeletal:      Cervical back: Neck supple. Swelling (very minimal right side neck to supraclavicular region) present. No erythema, spasms or crepitus. No pain with movement, spinous process tenderness or muscular tenderness. Right lower leg: No edema. Left lower leg: No edema. Comments: No redness or bruising. Lymphadenopathy:      Head:      Right side of head: No submandibular or posterior auricular adenopathy. Left side of head: No submandibular or posterior auricular adenopathy. Cervical: Cervical adenopathy present. Right cervical: Posterior cervical adenopathy (single small node-tender) present. No deep cervical adenopathy. Left cervical: No deep or posterior cervical adenopathy. Upper Body:      Right upper body: No supraclavicular adenopathy. Left upper body: No supraclavicular adenopathy. Skin:     General: Skin is warm and dry. Neurological:      Mental Status: She is alert and oriented to person, place, and time.    Psychiatric:         Mood and Affect: Mood normal.         Behavior: Behavior normal. Behavior is cooperative. Visit Vitals  /68 (BP 1 Location: Left upper arm, BP Patient Position: Sitting, BP Cuff Size: Adult)   Pulse 82   Temp 97.1 °F (36.2 °C) (Temporal)   Resp 16   Ht 5' 6\" (1.676 m)   Wt 186 lb 3.2 oz (84.5 kg)   SpO2 97%   BMI 30.05 kg/m²         Disclaimer:  Advised her to call back or return to office if symptoms worsen/change/persist.  Discussed expected course/resolution/complications of diagnosis in detail with patient. Medication risks/benefits/costs/interactions/alternatives discussed with patient. She was given an after visit summary which includes diagnoses, current medications, & vitals. She expressed understanding and agrees with the diagnosis and plan.         MONICA Painting

## 2021-11-22 NOTE — PATIENT INSTRUCTIONS
Swollen Lymph Nodes: Care Instructions  Your Care Instructions     Lymph nodes are small, bean-shaped glands throughout the body. They help your body fight germs and infections. Lymph nodes often swell when there is a problem such as an injury, infection, or tumor. · The nodes in your neck, under your chin, or behind your ears may swell when you have a cold or sore throat. · An injury or infection in a leg or foot can make the nodes in your groin swell. · Sometimes medicine can make lymph nodes swell, but this is rare. Treatment depends on what caused your nodes to swell. Usually the nodes return to normal size without a problem. Follow-up care is a key part of your treatment and safety. Be sure to make and go to all appointments, and call your doctor if you are having problems. It's also a good idea to know your test results and keep a list of the medicines you take. How can you care for yourself at home? · Take your medicines exactly as prescribed. Call your doctor if you think you are having a problem with your medicine. · Avoid irritation. ? Do not squeeze or pick at the lump. ? Do not stick a needle in it. · Prevent infection. Do not squeeze, drain, or puncture a painful lump. Doing this can irritate or inflame the lump, push any existing infection deeper into the skin, or cause severe bleeding. · Get extra rest. Slow down just a little from your usual routine. · Drink plenty of fluids. If you have kidney, heart, or liver disease and have to limit fluids, talk with your doctor before you increase the amount of fluids you drink. · Take an over-the-counter pain medicine, such as acetaminophen (Tylenol), ibuprofen (Advil, Motrin), or naproxen (Aleve). Read and follow all instructions on the label. · Do not take two or more pain medicines at the same time unless the doctor told you to. Many pain medicines have acetaminophen, which is Tylenol.  Too much acetaminophen (Tylenol) can be harmful. When should you call for help? Call your doctor now or seek immediate medical care if:    · You have worse symptoms of infection, such as:  ? Increased pain, swelling, warmth, or redness. ? Red streaks leading from the area. ? Pus draining from the area. ? A fever. Watch closely for changes in your health, and be sure to contact your doctor if:    · Your lymph nodes do not get smaller or do not return to normal.     · You do not get better as expected. Where can you learn more? Go to http://www.gray.com/  Enter A919 in the search box to learn more about \"Swollen Lymph Nodes: Care Instructions. \"  Current as of: July 1, 2021               Content Version: 13.0  © 2006-2021 [employer]. Care instructions adapted under license by [employer] (which disclaims liability or warranty for this information). If you have questions about a medical condition or this instruction, always ask your healthcare professional. [place] [address] any warranty or liability for your use of this information.

## 2021-11-23 LAB
ALBUMIN SERPL-MCNC: 4.4 G/DL (ref 3.8–4.8)
ALBUMIN/GLOB SERPL: 2.4 {RATIO} (ref 1.2–2.2)
ALP SERPL-CCNC: 93 IU/L (ref 44–121)
ALT SERPL-CCNC: 11 IU/L (ref 0–32)
AST SERPL-CCNC: 11 IU/L (ref 0–40)
BASOPHILS # BLD AUTO: 0 X10E3/UL (ref 0–0.2)
BASOPHILS NFR BLD AUTO: 0 %
BILIRUB SERPL-MCNC: <0.2 MG/DL (ref 0–1.2)
BUN SERPL-MCNC: 18 MG/DL (ref 8–27)
BUN/CREAT SERPL: 13 (ref 12–28)
CALCIUM SERPL-MCNC: 9.2 MG/DL (ref 8.7–10.3)
CHLORIDE SERPL-SCNC: 113 MMOL/L (ref 96–106)
CO2 SERPL-SCNC: 22 MMOL/L (ref 20–29)
CREAT SERPL-MCNC: 1.38 MG/DL (ref 0.57–1)
EOSINOPHIL # BLD AUTO: 0.3 X10E3/UL (ref 0–0.4)
EOSINOPHIL NFR BLD AUTO: 4 %
ERYTHROCYTE [DISTWIDTH] IN BLOOD BY AUTOMATED COUNT: 13.5 % (ref 11.7–15.4)
GLOBULIN SER CALC-MCNC: 1.8 G/DL (ref 1.5–4.5)
GLUCOSE SERPL-MCNC: 107 MG/DL (ref 65–99)
HCT VFR BLD AUTO: 38.2 % (ref 34–46.6)
HGB BLD-MCNC: 12.7 G/DL (ref 11.1–15.9)
IMM GRANULOCYTES # BLD AUTO: 0 X10E3/UL (ref 0–0.1)
IMM GRANULOCYTES NFR BLD AUTO: 1 %
LYMPHOCYTES # BLD AUTO: 0.8 X10E3/UL (ref 0.7–3.1)
LYMPHOCYTES NFR BLD AUTO: 14 %
MCH RBC QN AUTO: 29.1 PG (ref 26.6–33)
MCHC RBC AUTO-ENTMCNC: 33.2 G/DL (ref 31.5–35.7)
MCV RBC AUTO: 87 FL (ref 79–97)
MONOCYTES # BLD AUTO: 0.5 X10E3/UL (ref 0.1–0.9)
MONOCYTES NFR BLD AUTO: 8 %
NEUTROPHILS # BLD AUTO: 4.5 X10E3/UL (ref 1.4–7)
NEUTROPHILS NFR BLD AUTO: 73 %
PLATELET # BLD AUTO: 178 X10E3/UL (ref 150–450)
POTASSIUM SERPL-SCNC: 4.7 MMOL/L (ref 3.5–5.2)
PROT SERPL-MCNC: 6.2 G/DL (ref 6–8.5)
RBC # BLD AUTO: 4.37 X10E6/UL (ref 3.77–5.28)
SODIUM SERPL-SCNC: 147 MMOL/L (ref 134–144)
TSH SERPL DL<=0.005 MIU/L-ACNC: 3.92 UIU/ML (ref 0.45–4.5)
WBC # BLD AUTO: 6.1 X10E3/UL (ref 3.4–10.8)

## 2021-11-23 NOTE — PROGRESS NOTES
TSH has normalized. CBC normal. Non-fasting glucose of 107 is ok. Creatinine on increasing trend-will advise avoid NSAIDS and ensure drinking adequate water. Will forward results to her PCP.

## 2021-11-25 NOTE — PROGRESS NOTES
Likely related to starting aldactone. Has f/u in Feb, will repeat then . Agree w/ hydration and avoiding NSAID's.

## 2021-12-21 DIAGNOSIS — F17.200 TOBACCO DEPENDENCE: ICD-10-CM

## 2021-12-21 DIAGNOSIS — F41.1 GAD (GENERALIZED ANXIETY DISORDER): ICD-10-CM

## 2021-12-21 DIAGNOSIS — F41.8 DEPRESSION WITH ANXIETY: ICD-10-CM

## 2021-12-22 RX ORDER — BUPROPION HYDROCHLORIDE 300 MG/1
300 TABLET ORAL DAILY
Qty: 90 TABLET | Refills: 1 | Status: SHIPPED | OUTPATIENT
Start: 2021-12-22 | End: 2022-03-23 | Stop reason: SDUPTHER

## 2021-12-29 ENCOUNTER — PATIENT MESSAGE (OUTPATIENT)
Dept: NEUROLOGY | Age: 66
End: 2021-12-29

## 2021-12-29 DIAGNOSIS — F33.41 RECURRENT MAJOR DEPRESSIVE DISORDER, IN PARTIAL REMISSION (HCC): ICD-10-CM

## 2021-12-29 DIAGNOSIS — F41.1 GAD (GENERALIZED ANXIETY DISORDER): ICD-10-CM

## 2021-12-29 RX ORDER — SERTRALINE HYDROCHLORIDE 100 MG/1
TABLET, FILM COATED ORAL
Qty: 135 TABLET | Refills: 1 | Status: SHIPPED | OUTPATIENT
Start: 2021-12-29 | End: 2022-04-13 | Stop reason: SDUPTHER

## 2022-01-04 ENCOUNTER — DOCUMENTATION ONLY (OUTPATIENT)
Dept: NEUROLOGY | Age: 67
End: 2022-01-04

## 2022-01-04 NOTE — PROGRESS NOTES
Received neuropsychological test results from Dr. Devon Noe office, dates of service 9/17/2018 and 10/19/2018:  Cognitively, the patient's overall performance was consistently within the normal range. Attention, language, and/or word retrieval and memory complaints were not supported. Suspected normal aging concerned. Note sent to scanning.

## 2022-01-06 NOTE — TELEPHONE ENCOUNTER
Spoke w/ pt. Inform her that she was placed on cancellation list for a Feb appt and also mention that she still has her appt w/ Dr. Merle Gunn for March 14, 22. Inform her that she will be notified once an appt become available in Feb. Pt verbalizes understanding.

## 2022-01-30 ENCOUNTER — PATIENT MESSAGE (OUTPATIENT)
Dept: NEUROLOGY | Age: 67
End: 2022-01-30

## 2022-01-31 NOTE — TELEPHONE ENCOUNTER
Called pt. No answer left message on VM stating that unfortunately there has not been any cancellations for Feb appt w/ Dr. Susana Man. Inform pt that she will be notified when an appt becomes available. Also stated to call office with any questions.

## 2022-02-01 ENCOUNTER — TRANSCRIBE ORDER (OUTPATIENT)
Dept: GENERAL RADIOLOGY | Age: 67
End: 2022-02-01

## 2022-02-01 ENCOUNTER — OFFICE VISIT (OUTPATIENT)
Dept: INTERNAL MEDICINE CLINIC | Age: 67
End: 2022-02-01
Payer: COMMERCIAL

## 2022-02-01 ENCOUNTER — HOSPITAL ENCOUNTER (OUTPATIENT)
Dept: GENERAL RADIOLOGY | Age: 67
Discharge: HOME | End: 2022-02-01
Attending: NURSE PRACTITIONER
Payer: COMMERCIAL

## 2022-02-01 VITALS
HEART RATE: 75 BPM | OXYGEN SATURATION: 96 % | RESPIRATION RATE: 16 BRPM | TEMPERATURE: 97.1 F | WEIGHT: 181.4 LBS | SYSTOLIC BLOOD PRESSURE: 130 MMHG | DIASTOLIC BLOOD PRESSURE: 80 MMHG | HEIGHT: 66 IN | BODY MASS INDEX: 29.15 KG/M2

## 2022-02-01 DIAGNOSIS — M54.42 ACUTE LEFT-SIDED LOW BACK PAIN WITH LEFT-SIDED SCIATICA: ICD-10-CM

## 2022-02-01 DIAGNOSIS — M54.42 ACUTE LEFT-SIDED LOW BACK PAIN WITH LEFT-SIDED SCIATICA: Primary | ICD-10-CM

## 2022-02-01 DIAGNOSIS — M54.42 LOW BACK PAIN WITH LEFT-SIDED SCIATICA: Primary | ICD-10-CM

## 2022-02-01 PROCEDURE — 72100 X-RAY EXAM L-S SPINE 2/3 VWS: CPT

## 2022-02-01 PROCEDURE — 99213 OFFICE O/P EST LOW 20 MIN: CPT | Performed by: NURSE PRACTITIONER

## 2022-02-01 RX ORDER — METHYLPREDNISOLONE 4 MG/1
TABLET ORAL
Qty: 1 DOSE PACK | Refills: 0 | Status: SHIPPED | OUTPATIENT
Start: 2022-02-01 | End: 2022-02-25

## 2022-02-01 NOTE — PROGRESS NOTES
HISTORY OF PRESENT ILLNESS  David Decker is a 78 y.o. female. Patient reports left heel spur diagnosed last Spring. She saw Dr. Camilo Greco at [facility], but did not have a good experience. Now has appointment with Dr. Adrianna Johns. Patient reports left lateral thigh pain since June. She notes left sided low back pain. She did one PT session for heel about a month ago. She notes numbness and tingling radiating  Down thigh to left foot. She has taken ibuprofen periodically, but tries not to due to kidney function. Most evenings she takes tylenol with some relief. No weakness of extremities, but states she feels a \"hot\" sensation go down her lateral left leg at times, but not warm to touch. Patient has history of scoliosis and cervical stenosis. Patient also has upcoming appointment with Dr. Awilda Estrada on 2/7. Visit Vitals  /80 (BP 1 Location: Left upper arm, BP Patient Position: Sitting, BP Cuff Size: Adult)   Pulse 75   Temp 97.1 °F (36.2 °C) (Temporal)   Resp 16   Ht 5' 6\" (1.676 m)   Wt 181 lb 6.4 oz (82.3 kg)   SpO2 96%   BMI 29.28 kg/m²       HPI    Review of Systems   Musculoskeletal: Positive for back pain (left leg pain, left heel pain). Physical Exam  Constitutional:       Appearance: Normal appearance. Musculoskeletal:      Lumbar back: Tenderness (left SI joint pain with palpation; radiates down lateral left leg to toes; left hip sitting slightly higher than right) present. Neurological:      General: No focal deficit present. Mental Status: She is alert and oriented to person, place, and time. Psychiatric:         Mood and Affect: Mood normal.         Behavior: Behavior normal.         ASSESSMENT and PLAN    ICD-10-CM ICD-9-CM    1.  Acute left-sided low back pain with left-sided sciatica  M54.42 724.2 REFERRAL TO PHYSICAL THERAPY     724.3 XR SPINE LUMB 2 OR 3 V     Orders Placed This Encounter    XR SPINE LUMB 2 OR 3 V    REFERRAL TO PHYSICAL THERAPY    methylPREDNISolone (MEDROL DOSEPACK) 4 mg tablet   medrol dose pack due to kidney function-avoid NSAIDS  Xray of lumbar spine  PT ordered  Follow-up with Dr. Radha Durand as scheduled

## 2022-02-01 NOTE — TELEPHONE ENCOUNTER
Called pt. No answer left message on VM to call office. (Calling pt to offer appt for Feb 7, 22 at 2pm w/ Dr. Ruthie Marie. Sent pt a EQUISOt message. Awaiting on pt to respond or to call office).

## 2022-02-02 ENCOUNTER — TELEPHONE (OUTPATIENT)
Dept: INTERNAL MEDICINE CLINIC | Age: 67
End: 2022-02-02

## 2022-02-08 ENCOUNTER — DOCUMENTATION ONLY (OUTPATIENT)
Dept: NEUROLOGY | Age: 67
End: 2022-02-08

## 2022-02-08 NOTE — PROGRESS NOTES
Called pt. Verified.  Offered pt f/u appt w/ Dr. Sybil Purcell for Thursday 2/10/22 at 3:40pm. Pt confirm appt and is scheduled with Dr. Sybil Purcell for 2/10/22 at 3:40pm.

## 2022-02-10 ENCOUNTER — OFFICE VISIT (OUTPATIENT)
Dept: NEUROLOGY | Age: 67
End: 2022-02-10
Payer: COMMERCIAL

## 2022-02-10 VITALS
BODY MASS INDEX: 29.57 KG/M2 | HEIGHT: 66 IN | WEIGHT: 184 LBS | SYSTOLIC BLOOD PRESSURE: 120 MMHG | DIASTOLIC BLOOD PRESSURE: 80 MMHG | HEART RATE: 89 BPM | OXYGEN SATURATION: 97 %

## 2022-02-10 DIAGNOSIS — M54.10 RADICULAR SYNDROME OF LEFT LEG: Primary | ICD-10-CM

## 2022-02-10 PROCEDURE — 99214 OFFICE O/P EST MOD 30 MIN: CPT | Performed by: PSYCHIATRY & NEUROLOGY

## 2022-02-10 NOTE — PROGRESS NOTES
Neurology Consult Note      HISTORY PROVIDED BY: patient    Chief Complaint:   Chief Complaint   Patient presents with    Follow-up    Neurologic Problem      Subjective:   Pt is a 78 y.o. right handed female initially and last seen in clinic on 9/15/21 with loss of temperature sensation beginning at her left buttocks down lateral leg to foot, has tingling in left toes, maybe a little on right, same in fingers L>>R. No LBP or radicular leg pain, +bladder urgency, +change in gait. Prior head and neck imaging revealed incidental cervical spine disease. Last week had acute onset of vertigo with mild nausea, non-positional with negative ENT evaluation. Exam with brisk reflexes, +High's, upgoing toe on left, dec pp in left great toe and loss of temperature sensation on left lateral leg, mildly ataxic gait. History and exam were concerning for cervical myelopathy, recommended MRI of the C-spine w/wo contrast to assess for cord signal change, possibly due to cord compression versus demyelinating lesion with recent sudden onset of nonpositional vertigo raising concern for demyelinating disease. Recommended MRI of the brain w/wo contrast.    She returns for f/u. MRI C-spine w/wo revealed severe canal and left lateral NF stenosis with mild cord compression and minimal cord edema at C3-C4 with mod to severe canal and NF stenoses at C4-C5, C5-C6 and C6-C7. She was referred to [facility] and she had cervical spine surgery in October with Dr. Shankar Carr. MRI rancho was not done. Received neuropsychological test results from Dr. Elmira Jay office, dates of service 9/17/2018 and 10/19/2018: Cognitively, the patient's overall performance was consistently within the normal range. Attention, language, and/or word retrieval and memory complaints were not supported. Suspected normal aging. She returns with ongoing c/o left leg pain and electric tingling down lateral leg. She has been found to have left heel spur and has pain in left foot. Her PCP started a prednisone taper with improvement in sxs, but they quickly returned. Xray L-spine 22 was unremarkable. Previous Testing:  -MRI C-spine w/wo revealed severe canal and left lateral NF stenosis with mild cord compression and minimal cord edema at C3-C4 with mod to severe canal and NF stenoses at C4-C5, C5-C6 and C6-C7.  -Neuropsychological test results from Dr. Radha Dejesus office, dates of service 2018 and 10/19/2018: Cognitively, the patient's overall performance was consistently within the normal range. Attention, language, and/or word retrieval and memory complaints were not supported. Suspected normal aging. Past Medical History:   Diagnosis Date    Anxiety disorder     Cancer ([facility] [address]     SCC, SIDE OF NOSE    Carpal tunnel syndrome of left wrist     Chronic pain     6 MONTHS    DDD (degenerative disc disease), thoracic 10/13/14    Depression     Hypercholesterolemia     Menopause     LMP-?     MSSA (methicillin susceptible Staphylococcus aureus) 10/08/2021    nares    Neurofibroma determined by biopsy of tongue     Dr. Jessica Ramirez Osteopenia     Prediabetes 2016    A1C 6.1    Scoliosis 10/13/14    thoracic spine, mild    Sleep apnea     USES CPAP    Vitamin B12 deficiency     Vitamin D deficiency       Past Surgical History:   Procedure Laterality Date    HX CARPAL TUNNEL RELEASE Left     HX CERVICAL DISKECTOMY  10/14/2021    C3/4    HX CERVICAL FUSION  10/14/2021    C3/4    HX  SECTION  1984    HX  SECTION      HX COLONOSCOPY  2020    nl whole colon & TI-bx    HX HEENT  2017    tongue biopsy    HX ORTHOPAEDIC Right 2021    contracture - s/p tendon sheath release    HX ORTHOPAEDIC Left     CTR    HX SHANNA AND BSO  1996    Fibroids      Social History     Socioeconomic History    Marital status:      Spouse name: Not on file    Number of children: Not on file    Years of education: Not on file    Highest education level: Not on file   Occupational History    Occupation: Teaches at [employer], Artist and illustrator   Tobacco Use    Smoking status: Former Smoker     Packs/day: 0.50     Years: 30.00     Pack years: 15.00     Quit date: 2020     Years since quittin.1    Smokeless tobacco: Never Used    Tobacco comment: stopped smoking 0n January 3, 2021   Vaping Use    Vaping Use: Never used   Substance and Sexual Activity    Alcohol use: Yes     Alcohol/week: 3.0 standard drinks     Types: 3 Glasses of wine per week    Drug use: Not Currently    Sexual activity: Yes     Partners: Male     Birth control/protection: None   Other Topics Concern     Service Not Asked    Blood Transfusions Not Asked    Caffeine Concern Not Asked    Occupational Exposure Not Asked    Hobby Hazards Not Asked    Sleep Concern Not Asked    Stress Concern Not Asked    Weight Concern Not Asked    Special Diet Not Asked    Back Care Not Asked    Exercise Not Asked    Bike Helmet Not Asked    [address] Not Asked    Self-Exams Not Asked   Social History Narrative    Lives in [facility] with      Social Determinants of Health     Financial Resource Strain:     Difficulty of Paying Living Expenses: Not on file   Food Insecurity:     Worried About Running Out of Food in the Last Year: Not on file    Shankar of Food in the Last Year: Not on file   Transportation Needs:     Lack of Transportation (Medical): Not on file    Lack of Transportation (Non-Medical):  Not on file   Physical Activity:     Days of Exercise per Week: Not on file    Minutes of Exercise per Session: Not on file   Stress:     Feeling of Stress : Not on file   Social Connections:     Frequency of Communication with Friends and Family: Not on file    Frequency of Social Gatherings with Friends and Family: Not on file    Attends Yazidism Services: Not on file    Active Member of Clubs or Organizations: Not on file    Attends Club or Organization Meetings: Not on file    Marital Status: Not on file   Intimate Partner Violence:     Fear of Current or Ex-Partner: Not on file    Emotionally Abused: Not on file    Physically Abused: Not on file    Sexually Abused: Not on file   Housing Stability:     Unable to Pay for Housing in the Last Year: Not on file    Number of [place] in the Last Year: Not on file    Unstable Housing in the Last Year: Not on file     Family History   Problem Relation Age of Onset    Coronary Art Dis Father         76, first episode late 63's    Alzheimer's Disease Father     Heart Disease Father         Artery disease, Alzhimers    Pacemaker Brother         2/21    Thyroid Disease Brother     Arrhythmia Mother         Afib     Breast Cancer Mother 52    Thyroid Disease Mother     Cancer Mother         Breast Cancer    Heart Disease Mother         Tachycardia, leaky vlave    OSTEOARTHRITIS Mother     Breast Cancer Maternal Grandmother         Great grandmother/age unknown    No Known Problems Son     No Known Problems Son     Bleeding Prob Sister         on perscription blood thinners due to minor stroke    Stroke Sister         small stroke due to lack of liver enzyme    Anesth Problems Neg Hx          Objective:   Review of Systems   Respiratory:        Snoring   Neurological: Positive for sensory change and weakness. Psychiatric/Behavioral: The patient is nervous/anxious. All other systems reviewed and are negative. Allergies   Allergen Reactions    Keflex [Cephalexin] Nausea Only    Nitrofurantoin Monohyd/M-Cryst Nausea Only        Meds:  Outpatient Medications Prior to Visit   Medication Sig Dispense Refill    sertraline (ZOLOFT) 100 mg tablet TAKE 1 & 1/2 TABLETS DAILY 135 Tablet 1    buPROPion XL (WELLBUTRIN XL) 300 mg XL tablet Take 1 Tablet by mouth daily. 90 Tablet 1    acetaminophen (TYLENOL) 325 mg tablet Take 2 Tablets by mouth every six (6) hours.  10 Tablet 0    cyanocobalamin (Vitamin B-12) 1,000 mcg tablet Take 1,000 mcg by mouth nightly.  alendronate (FOSAMAX) 35 mg tablet Take 1 Tablet by mouth every seven (7) days. (Patient taking differently: Take 35 mg by mouth every seven (7) days. TAKES EVERY SUNDAY) 12 Tablet 1    rosuvastatin (CRESTOR) 10 mg tablet TAKE 1 TABLET AT NIGHT 90 Tablet 1    spironolactone (ALDACTONE) 50 mg tablet Take 50 mg by mouth nightly.  CALCIUM PO Take  by mouth daily.  FEXOFENADINE HCL (ALLEGRA PO) Take 1 Tab by mouth daily as needed.  cholecalciferol (VITAMIN D3) 1,000 unit tablet Take 2,000 Units by mouth two (2) times a day.  methylPREDNISolone (MEDROL DOSEPACK) 4 mg tablet Follow dose pack instructions (Patient not taking: Reported on 2/10/2022) 1 Dose Pack 0    ammonium lactate (AMLACTIN) 12 % topical cream  (Patient not taking: Reported on 2/10/2022)       No facility-administered medications prior to visit. Imaging:  MRI Results (most recent):  Results from Hospital Encounter encounter on 09/21/21    MRI CERV SPINE W WO CONT    Narrative  EXAM: MRI CERV SPINE W WO CONT  Clinical history: Hyperreflexia  INDICATION: Pt with hyperreflexia, dec PP in left hand and foot, and loss of  temperature in left lateral leg, eval for cord signal changes/stenosis. Disease  of spinal cord, unspecified    COMPARISON: None    TECHNIQUE: MR imaging of the cervical spine was performed using the following  sequences: sagittal T1, T2, STIR;  axial T2, T1 prior to and following contrast  administration. CONTRAST: 17 mL of Dotarem. FINDINGS:    Minimal retrolisthesis of C3 on C4. Moderate to severe congenital narrowing  cervical canal. Mild cord compression at C3-4. Mild cord edema at C3-4. Minimal  cord compression at C5-6 and C6-7 without cord edema. There is no Chiari or  syrinx. There is no fracture or dislocation. Vertebral body heights are  maintained. No focal marrow lesion    The craniocervical junction is intact.  Disc degenerative changes predominantly  asymmetric to the left There is no pathologic intrathecal enhancement. The paraspinal soft tissues are within normal limits. C2-C3: Mild facet arthropathy. Disc desiccation. Disc bulge/osteophyte. Mild  canal stenosis. Foramina are patent    C3-C4: Moderate left paracentral left lateral recess and left foraminal  protrusion. Moderate facet arthropathy. Severe canal stenosis. Severe left  lateral recess stenosis. Mild cord compression. Minimal cord edema. C4-C5: Protrusion/osteophyte left neural foramen. Mild facet arthropathy. Moderate canal and severe left foraminal stenosis. C5-C6: Disc bulge/osteophyte at the left neural foramen. Left paracentral  protrusion/osteophyte as well. Moderate canal and severe left foraminal  stenosis. C6-C7: Disc bulge/osteophyte. Mild facet arthropathy. Moderate to severe canal  stenosis with severe left and mild right foraminal stenosis. C7-T1: Disc bulge/osteophyte asymmetric to the right. Canal is patent. Moderate  right and moderate left foraminal stenosis. T2-T3 disc bulge/osteophyte. Mild canal stenosis at T2-T3. Impression  Multilevel disc and facet degenerative change with moderate to severe congenital  narrowing of the cervical canal.    Severe canal and left lateral recess stenosis with mild cord compression minimal  cord edema at C3-4. Moderate canal and severe left foraminal stenosis at C4-5 and C5-6. Moderate to severe canal with severe left foraminal stenosis at C6-7. Additional, less severe degenerative findings are as described in detail above. 23X     CT Results (most recent):  Results from Hospital Encounter encounter on 09/30/16    CT CHEST WO CONT    Narrative  CT CHEST WITHOUT CONTRAST. 9/30/2016 11:05 AM    INDICATION: New lung nodule in the left apex seen on chest radiograph. COMPARISON: Chest radiograph 9/26/2016, 6/19/2014; CT neck 8/20/2015.     TECHNIQUE: CT of the chest was performed without contrast. Coronal and sagittal  reconstructions were performed. CT dose reduction was achieved through use of a  standardized protocol tailored for this examination and automatic exposure  control for dose modulation. FINDINGS:  The lungs are clear. Specifically, there is no CT correlate for the nodule seen  in the left upper lobe on radiography. The central airways are patent. No  pneumothorax or pleural effusion. The heart size is normal. No thoracic  lymphadenopathy. A tiny left upper pole renal calculus is nonobstructing. The  visualized unenhanced upper abdomen is otherwise normal.    Impression  IMPRESSION:  Clear lungs. No CT correlate for the radiographic nodule. Reviewed records in [facility] today    Lab Review   Results for orders placed or performed in visit on 64/93/78   METABOLIC PANEL, COMPREHENSIVE   Result Value Ref Range    Glucose 107 (H) 65 - 99 mg/dL    BUN 18 8 - 27 mg/dL    Creatinine 1.38 (H) 0.57 - 1.00 mg/dL    GFR est non-AA 40 (L) >59 mL/min/1.73    GFR est AA 46 (L) >59 mL/min/1.73    BUN/Creatinine ratio 13 12 - 28    Sodium 147 (H) 134 - 144 mmol/L    Potassium 4.7 3.5 - 5.2 mmol/L    Chloride 113 (H) 96 - 106 mmol/L    CO2 22 20 - 29 mmol/L    Calcium 9.2 8.7 - 10.3 mg/dL    Protein, total 6.2 6.0 - 8.5 g/dL    Albumin 4.4 3.8 - 4.8 g/dL    GLOBULIN, TOTAL 1.8 1.5 - 4.5 g/dL    A-G Ratio 2.4 (H) 1.2 - 2.2    Bilirubin, total <0.2 0.0 - 1.2 mg/dL    Alk.  phosphatase 93 44 - 121 IU/L    AST (SGOT) 11 0 - 40 IU/L    ALT (SGPT) 11 0 - 32 IU/L   CBC WITH AUTOMATED DIFF   Result Value Ref Range    WBC 6.1 3.4 - 10.8 x10E3/uL    RBC 4.37 3.77 - 5.28 x10E6/uL    HGB 12.7 11.1 - 15.9 g/dL    HCT 38.2 34.0 - 46.6 %    MCV 87 79 - 97 fL    MCH 29.1 26.6 - 33.0 pg    MCHC 33.2 31.5 - 35.7 g/dL    RDW 13.5 11.7 - 15.4 %    PLATELET 004 810 - 759 x10E3/uL    NEUTROPHILS 73 Not Estab. %    Lymphocytes 14 Not Estab. %    MONOCYTES 8 Not Estab. %    EOSINOPHILS 4 Not Estab. % BASOPHILS 0 Not Estab. %    ABS. NEUTROPHILS 4.5 1.4 - 7.0 x10E3/uL    Abs Lymphocytes 0.8 0.7 - 3.1 x10E3/uL    ABS. MONOCYTES 0.5 0.1 - 0.9 x10E3/uL    ABS. EOSINOPHILS 0.3 0.0 - 0.4 x10E3/uL    ABS. BASOPHILS 0.0 0.0 - 0.2 x10E3/uL    IMMATURE GRANULOCYTES 1 Not Estab. %    ABS. IMM. GRANS. 0.0 0.0 - 0.1 x10E3/uL   TSH 3RD GENERATION   Result Value Ref Range    TSH 3.920 0.450 - 4.500 uIU/mL        Exam:  Visit Vitals  /80   Pulse 89   Ht 5' 6\" (1.676 m)   Wt 184 lb (83.5 kg)   SpO2 97%   BMI 29.70 kg/m²     General:  Alert, cooperative, no distress. Head:  Normocephalic, without obvious abnormality, atraumatic. Respiratory:  Heart:   Non labored breathing  Regular rate and rhythm, no murmurs   Neck:      Extremities: Warm, no cyanosis or edema. Pulses: 2+ radial pulses. Neurologic:  MS: Alert and oriented x 4, speech intact. Language intact. Attention and fund of knowledge appropriate. Recent and remote memory intact. Cranial Nerves:  II: visual fields    II: pupils    II: optic disc    III,VII: ptosis none   III,IV,VI: extraocular muscles  EOMI, no nystagmus or diplopia   V: facial light touch sensation     VII: facial muscle function   symmetric   VIII: hearing intact   IX: soft palate elevation     XI: trapezius strength     XI: sternocleidomastoid strength    XII: tongue       Motor: normal bulk and tone, no tremor              Strength: 5/5 throughout, no PD  Sensory: (At initial OV: Dec to PP in left hand and great toe, dec cold on left lateral leg)  Coordination: FTN and HTS intact, CHANTAL intact  Gait: Favors left leg, improved gait  Reflexes: 3+ symmetric           Assessment/Plan   Pt is a 78 y.o. right handed female initially presenting in Sept, 2021 with loss of temperature sensation beginning at her left buttocks down lateral leg to foot, has tingling in left toes, maybe a little on right, same in fingers L>>R. No LBP or radicular leg pain, +bladder urgency, +change in gait. Prior head and neck imaging revealed incidental cervical spine disease. Additionally, had acute onset of vertigo with mild nausea, non-positional.  Exam at presentation with brisk reflexes, +High's, upgoing toe on left, dec pp in left great toe and loss of temperature sensation on left lateral leg, mildly ataxic gait. MRI C-spine confirmed cervical myelopathy with cord compression and edema, s/p surgery. Now with ongoing c/o left leg pain and electric tingling down lateral leg. Exam with brisk reflexes, o/w non-focal.  Possible lumbar radiculopathy, though no objective deficits on exam.  Pain could be referred from hip or SI joint. Recommend starting with physical therapy, if no improvement in 6 weeks then will consider MRI of L-spine and/or NCS/EMG. Follow-up is made for next available, 5 months, patient instructed to call with any questions or concerns. ICD-10-CM ICD-9-CM    1. Radicular syndrome of left leg  M54.10 724.4 REFERRAL TO PHYSICAL THERAPY       Signed:   Spencer Matson MD  2/10/2022

## 2022-02-10 NOTE — LETTER
2/23/2022    Patient: Wanda Acevedo   YOB: 1955   Date of Visit: 2/10/2022     Kinsey Hui, [address] [facility]  [address] [place] [address]  Via [facility] [address]    Dear Kinsey Hui MD,      Thank you for referring Ms. Raz Pate to [address] [place] [address] for evaluation. My notes for this consultation are attached. If you have questions, please do not hesitate to call me. I look forward to following your patient along with you.       Sincerely,    Nataly Mccauley MD

## 2022-02-15 ENCOUNTER — PATIENT MESSAGE (OUTPATIENT)
Dept: NEUROLOGY | Age: 67
End: 2022-02-15

## 2022-02-15 NOTE — LETTER
2022 11:06 AM    RE:    [address] [address]      Thank you for agreeing to see [address] [facility] at [address] [place] [id number]  Phone: [phone]  Fax: [place] - Referral Requisition      _________________________________________________________________________________________     Name:  Yan Hines    MRN: [id number] : 1955              Sex: F   Address:  [address] [place] [address] Drive: [phone]    [address] [place] [id number] Preferred*: [phone]      Order: REFERRAL TO PHYSICAL THERAPY  Class: Outpatient Referral                Referral Priority: Routine  Associated Dx: Radicular syndrome of left leg (M54.10)     Comments: Pt with h/o cervical stenosis, s/p surgery, with ongoing c/o left leg pain that starts in buttocks.      [address], [address], [address] [place] [address] Avenue  Phone: [phone]     [place]     If Coverage Data blank - Coverage Not Found. Primary Coverage Secondary Coverage   Payor: BLUE CROSS [[id number]]  Plan: [employer] [[id number]]  ID: [id number]     Subscriber Information:  Name: Gayle Downing  : 1954  Address: [address] [facility]      Zip: [address]  Group No: [id number] Payor:  []  Plan:  []  ID:      Subscriber Information:  Name:    :    Address:    Cely Skoscars:    St:        Zip:    Group No:       Referral associated with order: [id number]     Referred by: Carmita Collado MD  Referral Reason: Specialty Services Required  Referred To:                                        Phone:  Fax:             Visits Requested: 1  Order Date: Feb 10, 2022  Expiration Date:        To Specialty/Loc:               Phone:  Fax:  -    -          **This is not your Insurance Authorization.  If your insurance requires an authorization, please see the Referral Coordinator or someone at [employer] who will acquire that for you.     Electronically Signed By: Anshu Bustillo      Ordering user:  Rachel Rogel MD Lic #  < Not on File > NPI: [id number]             I appreciate your assistance in Ms. Luis's care  and look forward to your findings and recommendations.         Sincerely,      Stacie Maynard MD

## 2022-02-20 DIAGNOSIS — E78.00 HYPERCHOLESTEREMIA: ICD-10-CM

## 2022-02-21 DIAGNOSIS — E78.00 HYPERCHOLESTEREMIA: ICD-10-CM

## 2022-02-21 DIAGNOSIS — E03.8 SUBCLINICAL HYPOTHYROIDISM: ICD-10-CM

## 2022-02-21 DIAGNOSIS — E53.8 B12 DEFICIENCY: Primary | ICD-10-CM

## 2022-02-21 RX ORDER — ROSUVASTATIN CALCIUM 10 MG/1
TABLET, COATED ORAL
Qty: 90 TABLET | Refills: 1 | Status: SHIPPED | OUTPATIENT
Start: 2022-02-21 | End: 2022-05-17 | Stop reason: SDUPTHER

## 2022-02-21 NOTE — TELEPHONE ENCOUNTER
From: Miriam Jose  To: Dominick Wilkinson MD  Sent: 2/15/2022 8:01 AM EST  Subject: Havent heard from PT    Dr. Fadi Patterson,  I havent heard from PT yet. Should I call them? If so, could I have their contact info? Thanks!   Aleah Lopez

## 2022-02-21 NOTE — TELEPHONE ENCOUNTER
Future Appointments   Date Time Provider Luisito Itzel   2/25/2022  9:15 AM Virgina Brunner, MD [facility] QUINCY NINO AMB   7/12/2022  3:20 PM MD ADAMS Dunham BS AMB

## 2022-02-21 NOTE — PROGRESS NOTES
Future Appointments   Date Time Provider Luisito Itzel   2/25/2022  9:15 AM Chris Obrien MD [facility] QU[facility] BS AMB   7/12/2022  3:20 PM MD JAYNE Ferrer BS AMB

## 2022-02-25 ENCOUNTER — OFFICE VISIT (OUTPATIENT)
Dept: INTERNAL MEDICINE CLINIC | Age: 67
End: 2022-02-25
Payer: COMMERCIAL

## 2022-02-25 VITALS
TEMPERATURE: 97.3 F | RESPIRATION RATE: 16 BRPM | HEIGHT: 66 IN | WEIGHT: 184 LBS | BODY MASS INDEX: 29.57 KG/M2 | SYSTOLIC BLOOD PRESSURE: 117 MMHG | HEART RATE: 73 BPM | DIASTOLIC BLOOD PRESSURE: 76 MMHG | OXYGEN SATURATION: 97 %

## 2022-02-25 DIAGNOSIS — R06.02 SOB (SHORTNESS OF BREATH): ICD-10-CM

## 2022-02-25 DIAGNOSIS — G95.9 CERVICAL MYELOPATHY (HCC): ICD-10-CM

## 2022-02-25 DIAGNOSIS — E78.00 HYPERCHOLESTEREMIA: Primary | ICD-10-CM

## 2022-02-25 DIAGNOSIS — M25.512 ACUTE PAIN OF LEFT SHOULDER: ICD-10-CM

## 2022-02-25 DIAGNOSIS — M85.89 OSTEOPENIA OF MULTIPLE SITES: ICD-10-CM

## 2022-02-25 DIAGNOSIS — F33.41 RECURRENT MAJOR DEPRESSIVE DISORDER, IN PARTIAL REMISSION (HCC): ICD-10-CM

## 2022-02-25 DIAGNOSIS — F41.1 GAD (GENERALIZED ANXIETY DISORDER): ICD-10-CM

## 2022-02-25 DIAGNOSIS — N18.31 STAGE 3A CHRONIC KIDNEY DISEASE (HCC): ICD-10-CM

## 2022-02-25 PROCEDURE — 93000 ELECTROCARDIOGRAM COMPLETE: CPT | Performed by: INTERNAL MEDICINE

## 2022-02-25 PROCEDURE — 99214 OFFICE O/P EST MOD 30 MIN: CPT | Performed by: INTERNAL MEDICINE

## 2022-02-25 NOTE — PROGRESS NOTES
Aby Marvin is a 78 y.o. female who was seen in clinic today (2/25/2022). Assessment & Plan:   Below is the assessment and plan developed based on review of pertinent history, physical exam, labs, studies, and medications. 1. Hypercholesteremia  Assessment & Plan:  previously at goal, continue current treatment pending review of labs as she is only taking 1/2 tab (she has lab slip)   2. Osteopenia of multiple sites  Assessment & Plan:  Asymptomatic, at goal,due for DEXA, will defer until next year   3. Recurrent major depressive disorder, in partial remission [facility])  Assessment & Plan:  stable, I reviewed treatment options with her, I reviewed life style changes to help improve mood, continue current treatment. 4. ENIO (generalized anxiety disorder)  Assessment & Plan:  stable, I reviewed treatment options with her, I reviewed life style changes to help improve mood, continue current treatment. 5. Cervical myelopathy (HCC)  Assessment & Plan:  Stable, monitored by specialist. No acute findings meriting change in the plan  6. Stage 3a chronic kidney disease ([facility] [address]  Assessment & Plan: Will check labs, will stay hydrated   7. Acute pain of left shoulder  Comments:  new dx, differential reviewed, sounds muscular, reassured I don't think this is from her neck, reviewed HEP and heat  8. SOB (shortness of breath)  Comments:  new dx, diffferential reviewed, unclear, EKG w/o abnormality, will have her see cardiology  Orders:  -     AMB POC EKG ROUTINE W/ 12 LEADS, INTER & REP  -     REFERRAL TO CARDIOLOGY    Follow-up and Dispositions    · Return in about 6 months (around 8/25/2022) for FULL PHYSICAL - 30 minutes. Subjective/Objective:   Chana Choudhary was seen today for Depression      Cardiovascular Review  The patient has hyperlipidemia. She reports not taking medications regularly as instructed - she is taking every other day due to concerns about her BS going up, no medication side effects noted.   She generally follows a low fat low cholesterol diet, exercises regularly. Endocrine Review  She is seen for pre-diabetes. Diabetic diet compliance: compliant most of the time. Lab review: labs reviewed and discussed with patient. She is seen for osteopenia. Since last visit: no changes. She is on the following treatment: Fosamax. She reports compliance with all meds, and denies any med side effects. DEXA last done on 1/24/20    Mental Health Review  Patient is seen for anxiety & depression. She reports feeling stress.  just had heart surgery. She spent 2-3 wks in [place] while he was at [address] [place] [address]. She is teaching one class. Any available GAD7 and PHQ9 reviewed. Reports experiences the following side effects from the treatment: none. Prior to Admission medications    Medication Sig Start Date End Date Taking? Authorizing Provider   rosuvastatin (CRESTOR) 10 mg tablet TAKE 1 TABLET AT NIGHT 2/21/22  Yes Suhail Carolina MD   sertraline (ZOLOFT) 100 mg tablet TAKE 1 & 1/2 TABLETS DAILY 12/29/21  Yes Suhail Carolina MD   buPROPion XL (WELLBUTRIN XL) 300 mg XL tablet Take 1 Tablet by mouth daily. 12/22/21  Yes Suhail Carolina MD   acetaminophen (TYLENOL) 325 mg tablet Take 2 Tablets by mouth every six (6) hours. 10/15/21  Yes Abram Tapia NP   cyanocobalamin (Vitamin B-12) 1,000 mcg tablet Take 1,000 mcg by mouth nightly. 9/27/21  Yes Suhail Carolina MD   alendronate (FOSAMAX) 35 mg tablet Take 1 Tablet by mouth every seven (7) days. Patient taking differently: Take 35 mg by mouth every seven (7) days. TAKES EVERY SUNDAY 9/24/21  Yes Suhail Carolina MD   ammonium lactate (AMLACTIN) 12 % topical cream  4/23/21  Yes Provider, Historical   spironolactone (ALDACTONE) 50 mg tablet Take 50 mg by mouth nightly. 6/18/21  Yes Provider, Historical   CALCIUM PO Take  by mouth daily.    Yes Provider, Historical   FEXOFENADINE HCL (ALLEGRA PO) Take 1 Tab by mouth daily as needed. Yes Provider, Historical   cholecalciferol (VITAMIN D3) 1,000 unit tablet Take 2,000 Units by mouth two (2) times a day. Yes Provider, Historical   methylPREDNISolone (MEDROL DOSEPACK) 4 mg tablet Follow dose pack instructions  Patient not taking: Reported on 2/10/2022 2/1/22 2/25/22  Ryan Chamberlain NP        Review of Systems   Constitutional: Negative for malaise/fatigue and weight loss. Respiratory: Positive for shortness of breath (new as of 3 months ago, heavier breathing w/ going up stairs. with walking will feel SOB intially but after 2-3 blocks is back to normal). Negative for cough. Cardiovascular: Negative for chest pain, palpitations and leg swelling. Gastrointestinal: Negative for abdominal pain, constipation, diarrhea, heartburn, nausea and vomiting. Musculoskeletal: Positive for myalgias (L shoulder, started a week ago, not changing, no injury). Negative for joint pain. Skin: Negative for rash. Neurological: Negative for dizziness and headaches. Psychiatric/Behavioral: Negative for depression. The patient is not nervous/anxious and does not have insomnia. Physical Exam  Constitutional:       General: She is not in acute distress. Appearance: Normal appearance. Eyes:      Conjunctiva/sclera: Conjunctivae normal.   Cardiovascular:      Rate and Rhythm: Regular rhythm. Heart sounds: Normal heart sounds. No murmur heard. Pulmonary:      Effort: Pulmonary effort is normal.      Breath sounds: Normal breath sounds. No decreased breath sounds, wheezing or rales. Abdominal:      General: Bowel sounds are normal.      Palpations: Abdomen is soft. There is no mass. Tenderness: There is no abdominal tenderness. Musculoskeletal:      Right shoulder: Tenderness present. No bony tenderness. Normal range of motion. Normal strength. Arms:       Cervical back: Normal range of motion and neck supple. Right lower leg: No edema.       Left lower leg: No edema.    Psychiatric:         Mood and Affect: Mood and affect normal.         Behavior: Behavior normal.       Visit Vitals  /76 (BP 1 Location: Left arm, BP Patient Position: Sitting, BP Cuff Size: Large adult)   Pulse 73   Temp 97.3 °F (36.3 °C) (Temporal)   Resp 16   Ht 5' 6\" (1.676 m)   Wt 184 lb (83.5 kg)   SpO2 97%   BMI 29.70 kg/m²       Gary Izquierdo MD

## 2022-02-25 NOTE — ASSESSMENT & PLAN NOTE
previously at goal, continue current treatment pending review of labs as she is only taking 1/2 tab (she has lab slip)

## 2022-02-25 NOTE — PROGRESS NOTES
Verified name and birth date for privacy precautions. Chart reviewed in preparation for today's visit.      Chief Complaint   Patient presents with    Depression          Health Maintenance Due   Topic    Shingrix Vaccine Age 50> (2 of 2)    Flu Vaccine (1)    Lipid Screen          Wt Readings from Last 3 Encounters:   02/25/22 184 lb (83.5 kg)   02/10/22 184 lb (83.5 kg)   02/01/22 181 lb 6.4 oz (82.3 kg)     Temp Readings from Last 3 Encounters:   02/25/22 97.3 °F (36.3 °C) (Temporal)   02/01/22 97.1 °F (36.2 °C) (Temporal)   11/22/21 97.1 °F (36.2 °C) (Temporal)     BP Readings from Last 3 Encounters:   02/25/22 117/76   02/10/22 120/80   02/01/22 130/80     Pulse Readings from Last 3 Encounters:   02/25/22 73   02/10/22 89   02/01/22 75         Learning Assessment:  :     Learning Assessment 4/20/2017 6/8/2015 4/14/2014   PRIMARY LEARNER Patient Patient Patient   HIGHEST LEVEL OF EDUCATION - PRIMARY LEARNER  4 YEARS OF COLLEGE 4 YEARS OF COLLEGE 4 YEARS OF COLLEGE   BARRIERS PRIMARY LEARNER NONE NONE NONE   CO-LEARNER CAREGIVER No No No   PRIMARY LANGUAGE ENGLISH ENGLISH ENGLISH    NEED - No No   LEARNER PREFERENCE PRIMARY DEMONSTRATION LISTENING DEMONSTRATION     LISTENING DEMONSTRATION PICTURES   LEARNING SPECIAL TOPICS - no no   ANSWERED BY patient patient patient   RELATIONSHIP SELF SELF SELF       Depression Screening:  :     3 most recent PHQ Screens 2/25/2022   Little interest or pleasure in doing things Not at all   Feeling down, depressed, irritable, or hopeless Not at all   Total Score PHQ 2 0   Trouble falling or staying asleep, or sleeping too much -   Feeling tired or having little energy -   Poor appetite, weight loss, or overeating -   Feeling bad about yourself - or that you are a failure or have let yourself or your family down -   Trouble concentrating on things such as school, work, reading, or watching TV -   Moving or speaking so slowly that other people could have noticed; or the opposite being so fidgety that others notice -   Thoughts of being better off dead, or hurting yourself in some way -   PHQ 9 Score -   How difficult have these problems made it for you to do your work, take care of your home and get along with others -       Fall Risk Assessment:  :     Fall Risk Assessment, last 12 mths 2/25/2022   Able to walk? Yes   Fall in past 12 months? 0   Do you feel unsteady? 1   Are you worried about falling 0   Is TUG test greater than 12 seconds? -   Is the gait abnormal? -   Number of falls in past 12 months -   Fall with injury? -       Abuse Screening:  :     Abuse Screening Questionnaire 2/25/2022 8/27/2021 9/30/2020 12/31/2019 4/20/2017 10/15/2015 4/14/2014   Do you ever feel afraid of your partner? N N N N N N N   Are you in a relationship with someone who physically or mentally threatens you? N N N N N N N   Is it safe for you to go home?  Italo Nicolas

## 2022-02-25 NOTE — ASSESSMENT & PLAN NOTE
stable, I reviewed treatment options with her, I reviewed life style changes to help improve mood, continue current treatment.

## 2022-02-26 LAB
BUN SERPL-MCNC: 22 MG/DL (ref 8–27)
BUN/CREAT SERPL: 17 (ref 12–28)
CALCIUM SERPL-MCNC: 9.6 MG/DL (ref 8.7–10.3)
CHLORIDE SERPL-SCNC: 104 MMOL/L (ref 96–106)
CHOLEST SERPL-MCNC: 239 MG/DL (ref 100–199)
CO2 SERPL-SCNC: 23 MMOL/L (ref 20–29)
CREAT SERPL-MCNC: 1.32 MG/DL (ref 0.57–1)
GLUCOSE SERPL-MCNC: 99 MG/DL (ref 65–99)
HDLC SERPL-MCNC: 60 MG/DL
LDLC SERPL CALC-MCNC: 157 MG/DL (ref 0–99)
POTASSIUM SERPL-SCNC: 5 MMOL/L (ref 3.5–5.2)
SODIUM SERPL-SCNC: 141 MMOL/L (ref 134–144)
TRIGL SERPL-MCNC: 126 MG/DL (ref 0–149)
TSH SERPL DL<=0.005 MIU/L-ACNC: 5.13 UIU/ML (ref 0.45–4.5)
VIT B12 SERPL-MCNC: 1779 PG/ML (ref 232–1245)
VLDLC SERPL CALC-MCNC: 22 MG/DL (ref 5–40)

## 2022-02-28 NOTE — PROGRESS NOTES
Results released to patient via "[employer]t. All labs are stable or at goal for her, except for her cholesterol. Has been taking 1/2 tab, needs to increase back to 1 tab. B12 level increased. TSH stable off medications.   Cr stable

## 2022-03-11 ENCOUNTER — OFFICE VISIT (OUTPATIENT)
Dept: CARDIOLOGY CLINIC | Age: 67
End: 2022-03-11
Payer: COMMERCIAL

## 2022-03-11 VITALS
SYSTOLIC BLOOD PRESSURE: 120 MMHG | WEIGHT: 188 LBS | HEIGHT: 66 IN | OXYGEN SATURATION: 97 % | BODY MASS INDEX: 30.22 KG/M2 | DIASTOLIC BLOOD PRESSURE: 76 MMHG | HEART RATE: 70 BPM | RESPIRATION RATE: 14 BRPM

## 2022-03-11 DIAGNOSIS — E78.00 HYPERCHOLESTEREMIA: ICD-10-CM

## 2022-03-11 DIAGNOSIS — R06.02 SOB (SHORTNESS OF BREATH): Primary | ICD-10-CM

## 2022-03-11 PROCEDURE — 99213 OFFICE O/P EST LOW 20 MIN: CPT | Performed by: SPECIALIST

## 2022-03-11 NOTE — PROGRESS NOTES
HISTORY OF PRESENT ILLNESS  Juan Alberto Berumen is a 78 y.o. female     SUMMARY:   Problem List  Date Reviewed: 3/11/2022          Codes Class Noted    Cervical myelopathy ([facility] ICD-10-CM: G95.9  ICD-9-CM: 721.1  2/25/2022        Stage 3a chronic kidney disease ([facility] ICD-10-CM: N18.31  ICD-9-CM: 585.3  2/25/2022        Status post surgery ICD-10-CM: Z98.890  ICD-9-CM: V45.89  10/14/2021        B12 deficiency ICD-10-CM: E53.8  ICD-9-CM: 266.2  8/27/2021        Subclinical hypothyroidism ICD-10-CM: E03.8  ICD-9-CM: 244.8  8/27/2021        Obesity (BMI 30.0-34.9) ICD-10-CM: E66.9  ICD-9-CM: 278.00  8/27/2021        Recurrent major depressive disorder ([facility] ICD-10-CM: F33.9  ICD-9-CM: 296.30  12/31/2019        Neurofibroma determined by biopsy of tongue ICD-10-CM: D36.10  ICD-9-CM: 215.9  6/14/2016    Overview Signed 6/14/2016 12:23 PM by Aziza Jason MD     ENT Dr. Dusty Martinez - 11/2015              Prediabetes ICD-10-CM: R73.03  ICD-9-CM: 790.29  6/1/2016    Overview Signed 8/22/2016 11:18 AM by Vikki JOSE     A1C 6.1             Hypercholesteremia ICD-10-CM: E78.00  ICD-9-CM: 272.0  6/19/2014        ENIO (generalized anxiety disorder) ICD-10-CM: F41.1  ICD-9-CM: 300.02  4/14/2014    Overview Signed 1/26/2015  9:31 AM by Aziza Jason MD     Prior therapy:  Effexor. Lexapro. Hypovitaminosis D ICD-10-CM: E55.9  ICD-9-CM: 268.9  4/14/2014        Osteopenia of multiple sites ICD-10-CM: M85.89  ICD-9-CM: 733.90  12/2/2013    Overview Signed 12/2/2013 11:59 AM by Aziza Jason MD     12/2012 - [facility]                   Current Outpatient Medications on File Prior to Visit   Medication Sig    rosuvastatin (CRESTOR) 10 mg tablet TAKE 1 TABLET AT NIGHT    sertraline (ZOLOFT) 100 mg tablet TAKE 1 & 1/2 TABLETS DAILY    buPROPion XL (WELLBUTRIN XL) 300 mg XL tablet Take 1 Tablet by mouth daily.  acetaminophen (TYLENOL) 325 mg tablet Take 2 Tablets by mouth every six (6) hours.     cyanocobalamin (Vitamin B-12) 1,000 mcg tablet Take 1,000 mcg by mouth nightly.  alendronate (FOSAMAX) 35 mg tablet Take 1 Tablet by mouth every seven (7) days. (Patient taking differently: Take 35 mg by mouth every seven (7) days. TAKES EVERY SUNDAY)    ammonium lactate (AMLACTIN) 12 % topical cream     spironolactone (ALDACTONE) 50 mg tablet Take 50 mg by mouth nightly.  CALCIUM PO Take  by mouth daily.  FEXOFENADINE HCL (ALLEGRA PO) Take 1 Tab by mouth daily as needed.  cholecalciferol (VITAMIN D3) 1,000 unit tablet Take 2,000 Units by mouth two (2) times a day. No current facility-administered medications on file prior to visit. CARDIOLOGY STUDIES TO DATE:  3/18 normal echo  7/21 normal stress echo    Chief Complaint   Patient presents with    Follow-up     HPI :  She has had quite a few things happen since we met last summer. She had to have a hand surgery she had to have cervical spine surgery she has bone spurs which is limited to her ability to exercise and currently she is having sciatic pain and is in physical therapy. She recently saw her PCP and mention shortness of breath. An EKG was obtained which I reviewed and it was completely normal.  Turns out looking back to the notes from last June as she was complaining of shortness of breath at that time and that is why we did a stress test which was negative. I had suggested to her that she see a pulmonary specialist if the stress test was negative and that never happened.   CARDIAC ROS:   negative for chest pain, palpitations, syncope, orthopnea, paroxysmal nocturnal dyspnea, exertional chest pressure/discomfort, claudication, lower extremity edema    Family History   Problem Relation Age of Onset    Coronary Art Dis Father         76, first episode late 63's    Alzheimer's Disease Father     Heart Disease Father         Artery disease, Alzhimers    Pacemaker Brother         2/21    Thyroid Disease Brother     Heart Disease Brother     Arrhythmia Mother         Afib     Breast Cancer Mother 52    Thyroid Disease Mother     Cancer Mother         Breast Cancer    Heart Disease Mother         Tachycardia, leaky vlave    OSTEOARTHRITIS Mother     Breast Cancer Maternal Grandmother         Great grandmother/age unknown    No Known Problems Son     No Known Problems Son     Bleeding Prob Sister         on perscription blood thinners due to minor stroke    Stroke Sister         small stroke due to lack of liver enzyme    Anesth Problems Neg Hx        Past Medical History:   Diagnosis Date    Anxiety disorder     Cancer ([address]     SCC, SIDE OF NOSE    Carpal tunnel syndrome of left wrist     Chronic pain     6 MONTHS    DDD (degenerative disc disease), thoracic 10/13/14    Depression     Hypercholesterolemia     Menopause     LMP-1996?  MSSA (methicillin susceptible Staphylococcus aureus) 10/08/2021    nares    Neurofibroma determined by biopsy of tongue     Dr. Ana Fairchild Osteopenia     Prediabetes 06/2016    A1C 6.1    Scoliosis 10/13/14    thoracic spine, mild    Sleep apnea     USES CPAP    Vitamin B12 deficiency     Vitamin D deficiency        GENERAL ROS:  A comprehensive review of systems was negative except for that written in the HPI.     Visit Vitals  /76 (BP 1 Location: Right arm, BP Patient Position: Sitting, BP Cuff Size: Adult)   Pulse 70   Resp 14   Ht 5' 6\" (1.676 m)   Wt 188 lb (85.3 kg)   SpO2 97%   BMI 30.34 kg/m²       Wt Readings from Last 3 Encounters:   03/11/22 188 lb (85.3 kg)   02/25/22 184 lb (83.5 kg)   02/10/22 184 lb (83.5 kg)            BP Readings from Last 3 Encounters:   03/11/22 120/76   02/25/22 117/76   02/10/22 120/80       PHYSICAL EXAM  General appearance: alert, cooperative, no distress, appears stated age  Neurologic: Alert and oriented X 3  Neck: supple, symmetrical, trachea midline, no adenopathy, no carotid bruit and no JVD  Lungs: clear to auscultation bilaterally  Heart: regular rate and rhythm, S1, S2 normal, no murmur, click, rub or gallop  Extremities: extremities normal, atraumatic, no cyanosis or edema    Lab Results   Component Value Date/Time    Cholesterol, total 239 (H) 02/25/2022 12:00 AM    Cholesterol, total 162 09/28/2020 01:25 PM    Cholesterol, total 128 05/06/2019 08:32 AM    Cholesterol, total 222 (H) 02/26/2018 08:30 AM    Cholesterol, total 149 04/20/2017 09:15 AM    HDL Cholesterol 60 02/25/2022 12:00 AM    HDL Cholesterol 67 09/28/2020 01:25 PM    HDL Cholesterol 56 05/06/2019 08:32 AM    HDL Cholesterol 52 02/26/2018 08:30 AM    HDL Cholesterol 56 04/20/2017 09:15 AM    LDL, calculated 157 (H) 02/25/2022 12:00 AM    LDL, calculated 74 09/28/2020 01:25 PM    LDL, calculated 58 05/06/2019 08:32 AM    LDL, calculated 146 (H) 02/26/2018 08:30 AM    LDL, calculated 74 04/20/2017 09:15 AM    LDL, calculated 137 (H) 06/07/2016 07:39 AM    LDL, calculated 127 (H) 07/25/2014 09:24 AM    Triglyceride 126 02/25/2022 12:00 AM    Triglyceride 121 09/28/2020 01:25 PM    Triglyceride 69 05/06/2019 08:32 AM    Triglyceride 119 02/26/2018 08:30 AM    Triglyceride 93 04/20/2017 09:15 AM     ASSESSMENT :      I am not sure that the symptoms she is having now are much different than what she had almost a year ago and she agrees. She thinks a lot of it is related to her deconditioning and being overweight and she may be right. I think she should go for a pulmonary evaluation just to check that out and so we have made a referral in that regard. She needs no further cardiac testing at this time. current treatment plan is effective, no change in therapy  lab results and schedule of future lab studies reviewed with patient  reviewed diet, exercise and weight control    Encounter Diagnoses   Name Primary?  SOB (shortness of breath) Yes    Hypercholesteremia      No orders of the defined types were placed in this encounter.       Follow-up and Dispositions · Return if symptoms worsen or fail to improve. Vaughn Tatum MD  3/11/2022  Please note that this dictation was completed with [employer], the computer voice recognition software. Quite often unanticipated grammatical, syntax, homophones, and other interpretive errors are inadvertently transcribed by the computer software. Please disregard these errors. Please excuse any errors that have escaped final proofreading. Thank you.

## 2022-03-18 PROBLEM — E66.9 OBESITY (BMI 30.0-34.9): Status: ACTIVE | Noted: 2021-08-27

## 2022-03-18 PROBLEM — E66.811 OBESITY (BMI 30.0-34.9): Status: ACTIVE | Noted: 2021-08-27

## 2022-03-18 PROBLEM — E53.8 B12 DEFICIENCY: Status: ACTIVE | Noted: 2021-08-27

## 2022-03-18 PROBLEM — N18.31 STAGE 3A CHRONIC KIDNEY DISEASE (HCC): Status: ACTIVE | Noted: 2022-02-25

## 2022-03-19 PROBLEM — F33.9 RECURRENT MAJOR DEPRESSIVE DISORDER (HCC): Status: ACTIVE | Noted: 2019-12-31

## 2022-03-20 PROBLEM — G95.9 CERVICAL MYELOPATHY (HCC): Status: ACTIVE | Noted: 2022-02-25

## 2022-03-20 PROBLEM — Z98.890 STATUS POST SURGERY: Status: ACTIVE | Noted: 2021-10-14

## 2022-03-20 PROBLEM — E03.8 SUBCLINICAL HYPOTHYROIDISM: Status: ACTIVE | Noted: 2021-08-27

## 2022-03-23 DIAGNOSIS — F41.8 DEPRESSION WITH ANXIETY: ICD-10-CM

## 2022-03-23 DIAGNOSIS — F41.1 GAD (GENERALIZED ANXIETY DISORDER): ICD-10-CM

## 2022-03-23 DIAGNOSIS — F17.200 TOBACCO DEPENDENCE: ICD-10-CM

## 2022-03-24 RX ORDER — BUPROPION HYDROCHLORIDE 300 MG/1
300 TABLET ORAL DAILY
Qty: 90 TABLET | Refills: 1 | Status: SHIPPED | OUTPATIENT
Start: 2022-03-24 | End: 2022-08-15 | Stop reason: SDUPTHER

## 2022-04-13 DIAGNOSIS — F33.41 RECURRENT MAJOR DEPRESSIVE DISORDER, IN PARTIAL REMISSION (HCC): ICD-10-CM

## 2022-04-13 DIAGNOSIS — F41.1 GAD (GENERALIZED ANXIETY DISORDER): ICD-10-CM

## 2022-04-14 RX ORDER — SERTRALINE HYDROCHLORIDE 100 MG/1
TABLET, FILM COATED ORAL
Qty: 135 TABLET | Refills: 1 | Status: SHIPPED | OUTPATIENT
Start: 2022-04-14 | End: 2022-10-12 | Stop reason: SDUPTHER

## 2022-05-17 DIAGNOSIS — E78.00 HYPERCHOLESTEREMIA: ICD-10-CM

## 2022-05-19 RX ORDER — ROSUVASTATIN CALCIUM 10 MG/1
TABLET, COATED ORAL
Qty: 90 TABLET | Refills: 1 | Status: SHIPPED | OUTPATIENT
Start: 2022-05-19

## 2022-05-19 RX ORDER — ALENDRONATE SODIUM 35 MG/1
35 TABLET ORAL
Qty: 12 TABLET | Refills: 1 | Status: SHIPPED | OUTPATIENT
Start: 2022-05-19

## 2022-06-20 DIAGNOSIS — F41.8 DEPRESSION WITH ANXIETY: ICD-10-CM

## 2022-06-20 DIAGNOSIS — F41.1 GAD (GENERALIZED ANXIETY DISORDER): ICD-10-CM

## 2022-06-20 DIAGNOSIS — F17.200 TOBACCO DEPENDENCE: ICD-10-CM

## 2022-06-21 RX ORDER — BUPROPION HYDROCHLORIDE 300 MG/1
300 TABLET ORAL DAILY
Qty: 90 TABLET | Refills: 1 | OUTPATIENT
Start: 2022-06-21

## 2022-06-26 DIAGNOSIS — F41.1 GAD (GENERALIZED ANXIETY DISORDER): ICD-10-CM

## 2022-06-26 DIAGNOSIS — F41.8 DEPRESSION WITH ANXIETY: ICD-10-CM

## 2022-06-26 DIAGNOSIS — F17.200 TOBACCO DEPENDENCE: ICD-10-CM

## 2022-06-29 RX ORDER — BUPROPION HYDROCHLORIDE 300 MG/1
300 TABLET ORAL DAILY
Qty: 90 TABLET | Refills: 1 | OUTPATIENT
Start: 2022-06-29

## 2022-08-15 DIAGNOSIS — F41.8 DEPRESSION WITH ANXIETY: ICD-10-CM

## 2022-08-15 DIAGNOSIS — F41.1 GAD (GENERALIZED ANXIETY DISORDER): ICD-10-CM

## 2022-08-15 DIAGNOSIS — F17.200 TOBACCO DEPENDENCE: ICD-10-CM

## 2022-08-15 NOTE — TELEPHONE ENCOUNTER
Requested Prescriptions     Pending Prescriptions Disp Refills    buPROPion XL (WELLBUTRIN XL) 300 mg XL tablet 90 Tablet 1     Sig: Take 1 Tablet by mouth in the morning.    [address] [facility]  [phone]

## 2022-08-16 RX ORDER — BUPROPION HYDROCHLORIDE 300 MG/1
300 TABLET ORAL DAILY
Qty: 90 TABLET | Refills: 1 | Status: SHIPPED | OUTPATIENT
Start: 2022-08-16

## 2022-09-08 ENCOUNTER — TRANSCRIBE ORDER (OUTPATIENT)
Dept: SCHEDULING | Age: 67
End: 2022-09-08

## 2022-09-08 DIAGNOSIS — Z12.31 SCREENING MAMMOGRAM FOR HIGH-RISK PATIENT: Primary | ICD-10-CM

## 2022-09-08 DIAGNOSIS — Z12.31 VISIT FOR SCREENING MAMMOGRAM: ICD-10-CM

## 2022-09-12 ENCOUNTER — HOSPITAL ENCOUNTER (OUTPATIENT)
Dept: MAMMOGRAPHY | Age: 67
Discharge: HOME | End: 2022-09-12
Attending: INTERNAL MEDICINE
Payer: COMMERCIAL

## 2022-09-12 DIAGNOSIS — Z12.31 VISIT FOR SCREENING MAMMOGRAM: ICD-10-CM

## 2022-09-12 PROCEDURE — 77063 BREAST TOMOSYNTHESIS BI: CPT

## 2022-09-20 DIAGNOSIS — E03.8 SUBCLINICAL HYPOTHYROIDISM: ICD-10-CM

## 2022-09-20 DIAGNOSIS — R73.03 PREDIABETES: ICD-10-CM

## 2022-09-20 DIAGNOSIS — E53.8 B12 DEFICIENCY: Primary | ICD-10-CM

## 2022-09-20 DIAGNOSIS — E78.00 HYPERCHOLESTEREMIA: ICD-10-CM

## 2022-09-21 NOTE — PROGRESS NOTES
Future Appointments   Date Time Provider Luisito Robbins   9/27/2022  1:30 PM Tavo Chavez MD [facility]

## 2022-09-26 DIAGNOSIS — F41.1 GAD (GENERALIZED ANXIETY DISORDER): ICD-10-CM

## 2022-09-26 DIAGNOSIS — F41.8 DEPRESSION WITH ANXIETY: ICD-10-CM

## 2022-09-26 DIAGNOSIS — F17.200 TOBACCO DEPENDENCE: ICD-10-CM

## 2022-09-27 ENCOUNTER — OFFICE VISIT (OUTPATIENT)
Dept: INTERNAL MEDICINE CLINIC | Age: 67
End: 2022-09-27
Payer: COMMERCIAL

## 2022-09-27 VITALS
OXYGEN SATURATION: 98 % | DIASTOLIC BLOOD PRESSURE: 71 MMHG | WEIGHT: 177 LBS | BODY MASS INDEX: 28.45 KG/M2 | TEMPERATURE: 98.2 F | HEART RATE: 68 BPM | SYSTOLIC BLOOD PRESSURE: 104 MMHG | HEIGHT: 66 IN | RESPIRATION RATE: 18 BRPM

## 2022-09-27 DIAGNOSIS — E66.3 OVERWEIGHT: ICD-10-CM

## 2022-09-27 DIAGNOSIS — F33.41 RECURRENT MAJOR DEPRESSIVE DISORDER, IN PARTIAL REMISSION (HCC): ICD-10-CM

## 2022-09-27 DIAGNOSIS — N18.31 STAGE 3A CHRONIC KIDNEY DISEASE (HCC): ICD-10-CM

## 2022-09-27 DIAGNOSIS — Z71.89 ADVANCED CARE PLANNING/COUNSELING DISCUSSION: ICD-10-CM

## 2022-09-27 DIAGNOSIS — R73.03 PREDIABETES: ICD-10-CM

## 2022-09-27 DIAGNOSIS — M85.89 OSTEOPENIA OF MULTIPLE SITES: ICD-10-CM

## 2022-09-27 DIAGNOSIS — Z00.00 ROUTINE PHYSICAL EXAMINATION: Primary | ICD-10-CM

## 2022-09-27 DIAGNOSIS — E78.00 HYPERCHOLESTEREMIA: ICD-10-CM

## 2022-09-27 DIAGNOSIS — Z23 ENCOUNTER FOR IMMUNIZATION: ICD-10-CM

## 2022-09-27 DIAGNOSIS — G95.9 CERVICAL MYELOPATHY (HCC): ICD-10-CM

## 2022-09-27 DIAGNOSIS — E53.8 B12 DEFICIENCY: ICD-10-CM

## 2022-09-27 DIAGNOSIS — F41.1 GAD (GENERALIZED ANXIETY DISORDER): ICD-10-CM

## 2022-09-27 PROBLEM — Z98.890 STATUS POST SURGERY: Status: RESOLVED | Noted: 2021-10-14 | Resolved: 2022-09-27

## 2022-09-27 PROBLEM — E66.9 OBESITY (BMI 30.0-34.9): Status: RESOLVED | Noted: 2021-08-27 | Resolved: 2022-09-27

## 2022-09-27 PROBLEM — E66.811 OBESITY (BMI 30.0-34.9): Status: RESOLVED | Noted: 2021-08-27 | Resolved: 2022-09-27

## 2022-09-27 LAB
ALBUMIN SERPL-MCNC: 4.5 G/DL (ref 3.8–4.8)
ALBUMIN/GLOB SERPL: 2.4 {RATIO} (ref 1.2–2.2)
ALP SERPL-CCNC: 97 IU/L (ref 44–121)
ALT SERPL-CCNC: 19 IU/L (ref 0–32)
AST SERPL-CCNC: 18 IU/L (ref 0–40)
BILIRUB SERPL-MCNC: 0.2 MG/DL (ref 0–1.2)
BUN SERPL-MCNC: 19 MG/DL (ref 8–27)
BUN/CREAT SERPL: 14 (ref 12–28)
CALCIUM SERPL-MCNC: 9.5 MG/DL (ref 8.7–10.3)
CHLORIDE SERPL-SCNC: 107 MMOL/L (ref 96–106)
CHOLEST SERPL-MCNC: 177 MG/DL (ref 100–199)
CO2 SERPL-SCNC: 21 MMOL/L (ref 20–29)
CREAT SERPL-MCNC: 1.33 MG/DL (ref 0.57–1)
EGFR: 44 ML/MIN/1.73
EST. AVERAGE GLUCOSE BLD GHB EST-MCNC: 131 MG/DL
GLOBULIN SER CALC-MCNC: 1.9 G/DL (ref 1.5–4.5)
GLUCOSE SERPL-MCNC: 107 MG/DL (ref 70–99)
HBA1C MFR BLD: 6.2 % (ref 4.8–5.6)
HDLC SERPL-MCNC: 62 MG/DL
LDLC SERPL CALC-MCNC: 92 MG/DL (ref 0–99)
POTASSIUM SERPL-SCNC: 5.1 MMOL/L (ref 3.5–5.2)
PROT SERPL-MCNC: 6.4 G/DL (ref 6–8.5)
SODIUM SERPL-SCNC: 145 MMOL/L (ref 134–144)
TRIGL SERPL-MCNC: 129 MG/DL (ref 0–149)
TSH SERPL DL<=0.005 MIU/L-ACNC: 5.45 UIU/ML (ref 0.45–4.5)
VIT B12 SERPL-MCNC: 1834 PG/ML (ref 232–1245)
VLDLC SERPL CALC-MCNC: 23 MG/DL (ref 5–40)

## 2022-09-27 PROCEDURE — 99397 PER PM REEVAL EST PAT 65+ YR: CPT | Performed by: INTERNAL MEDICINE

## 2022-09-27 PROCEDURE — 99214 OFFICE O/P EST MOD 30 MIN: CPT | Performed by: INTERNAL MEDICINE

## 2022-09-27 RX ORDER — BUPROPION HYDROCHLORIDE 300 MG/1
300 TABLET ORAL DAILY
Qty: 90 TABLET | Refills: 1 | OUTPATIENT
Start: 2022-09-27

## 2022-09-27 NOTE — ASSESSMENT & PLAN NOTE
New dx, weight decreased out of obesity range, last 5 yrs of weight reviewed and has yo-yo'ed, no red flags, will continue to monitor.

## 2022-09-27 NOTE — ASSESSMENT & PLAN NOTE
Stable, control is good for her, I reviewed treatment options with her, I reviewed life style changes to help improve mood, continue current treatment.

## 2022-09-27 NOTE — ASSESSMENT & PLAN NOTE
Stable, asymptomatic, no changes at this time, will not change around medications, reviewed to limit NSAIDs and stay hydrated.

## 2022-09-27 NOTE — PROGRESS NOTES
Results released to patient via [employer] All labs are stable or at goal for her, except for elevated TSH. B12 and A1c are stable. Na is high. She has f/u today to discuss.

## 2022-09-27 NOTE — ACP (ADVANCE CARE PLANNING)
Advance Care Planning   Advance Care Planning (ACP) Physician/NP/PA (Provider) Conversation    Date of ACP Conversation: 09/27/22  Persons included in Conversation:  patient  Length of ACP Conversation in minutes: <16 minutes (Non-Billable)    Authorized Decision Maker (if patient is incapable of making informed decisions):   Named in Advance Directive or Healthcare Power of       Primary Decision Maker: Yonas Sunshine - [phone]    Secondary Decision Maker: Noble Luis - Ranjit - [phone]    Secondary Decision Maker: [facility] - [phone]    She has an advanced directive - a copy has been provided & still reflects her wishes. Reviewed DNR/DNI and patient is not interested- elects Full Code (attempt resuscitation).        Hunter Morrison MD

## 2022-09-27 NOTE — PROGRESS NOTES
Nallely Sandra is a 78 y.o. female who was seen in clinic today (9/27/2022) for a full physical.           Assessment & Plan:   Below is the assessment and plan developed based on review of pertinent history, physical exam, labs, studies, and medications. 1. Routine physical examination  Comments:  recent labs reviewed with her from yesterday  2. Advanced care planning/counseling discussion  -     FULL CODE  3. Encounter for immunization  -     pneumococcal 20-emperatriz conj-dip, PF, (PREVNAR 20) 0.5 mL syrg injection; 0.5 mL by IntraMUSCular route once for 1 dose., Normal, Disp-0.5 mL, R-0  4. Recurrent major depressive disorder, in partial remission ([facility] [address]  Assessment & Plan:  Stable, control is good for her, I reviewed treatment options with her, I reviewed life style changes to help improve mood, continue current treatment. 5. ENIO (generalized anxiety disorder)  Assessment & Plan:  stable, continue current treatment    6. Stage 3a chronic kidney disease (HCC)  Assessment & Plan:  Stable, asymptomatic, no changes at this time, will not change around medications, reviewed to limit NSAIDs and stay hydrated. 7. Hypercholesteremia  Assessment & Plan:  Improved, at goal, continue current treatment   8. Osteopenia of multiple sites  Assessment & Plan:  Asymptomatic, due to repeat imaging, no changes pending review of DEXA   Orders:  -     DEXA BONE DENSITY STUDY AXIAL; Future  9. Prediabetes  Assessment & Plan:  Stable, reviewed FBS vs A1c, no medication changes, continue to work on diet and weight   10. Cervical myelopathy (HCC)  Assessment & Plan:  Improved, still having some issues, no red flags, reviewed expectations, will defer to specialist   11. B12 deficiency  Assessment & Plan:  Asymptomatic, labs at goal, no changes   12. Overweight  Assessment & Plan:  New dx, weight decreased out of obesity range, last 5 yrs of weight reviewed and has yo-yo'ed, no red flags, will continue to monitor.         Follow-up and Dispositions    Return in about 1 year (around 9/27/2023) for FULL PHYSICAL. Subjective:   Yolanda Muñoz is here today for a full physical.      Routine Physical Exam    The following acute and/or chronic problems were addressed today:    Mental Health Review  Patient is seen for anxiety & depression. Any available GAD7 and PHQ9 reviewed. Reports experiences the following side effects from the treatment: none. Cardiovascular Review  The patient has hyperlipidemia. She reports taking medications as instructed, no medication side effects noted. She generally follows a low fat low cholesterol diet, sedentary due to ankle injury (seeing ortho). Endocrine Review  She is seen for pre-diabetes. Diabetic diet compliance: compliant all of the time. Lab review: labs reviewed and discussed with patient. She is seen for osteopenia. Since last visit: no changes. She is on the following treatment: Fosamax 35mg. She reports compliance with all meds, and denies any med side effects. DEXA last done on 1/24/20        End of Life Planning: This was discussed with her today and she has an advanced directive - a copy has been provided. Reviewed DNR/DNI and patient is not interested.       Depression Screen:  3 most recent PHQ Screens 9/27/2022   Little interest or pleasure in doing things Not at all   Feeling down, depressed, irritable, or hopeless Not at all   Total Score PHQ 2 0   Trouble falling or staying asleep, or sleeping too much -   Feeling tired or having little energy -   Poor appetite, weight loss, or overeating -   Feeling bad about yourself - or that you are a failure or have let yourself or your family down -   Trouble concentrating on things such as school, work, reading, or watching TV -   Moving or speaking so slowly that other people could have noticed; or the opposite being so fidgety that others notice -   Thoughts of being better off dead, or hurting yourself in some way -   PHQ 9 Score - How difficult have these problems made it for you to do your work, take care of your home and get along with others -         Fall Risk:   Fall Risk Assessment, last 12 mths 9/27/2022   Able to walk? Yes   Fall in past 12 months? 0   Do you feel unsteady? 0   Are you worried about falling 0   Is TUG test greater than 12 seconds? -   Is the gait abnormal? -   Number of falls in past 12 months -   Fall with injury? -       Abuse Screen:  Abuse Screening Questionnaire 9/27/2022   Do you ever feel afraid of your partner? N   Are you in a relationship with someone who physically or mentally threatens you? N   Is it safe for you to go home? Y       Do you average more than 1 drink per night or more than 7 drinks a week:  No    On any one occasion in the past three months have you have had more than 3 drinks containing alcohol:  No    Health Maintenance:   Daily Aspirin: no  Bone Density: done 1/24/20 - osteopenia    Immunizations:   Covid: up to date  Influenza: up to date, she will get this fall  Tetanus: up to date  Shingles: not up to date - only received 1st vaccine  Pneumonia: not up to date, script provided  Cancer screening:   Cervical: reviewed guidelines, n/a - s/p hysterectomy  Breast: reviewed guidelines, up to date  Colon: reviewed guidelines, up to date        Patient Care Team:  Wendi Tucker MD as PCP - General (Internal Medicine Physician)  Wendi Tucker MD as PCP - [facility]  Saintclair Crocker, MD (Cardiovascular Disease Physician)  Marin Phillips MD (Obstetrics & Gynecology)  Vinh Lee MD (Ophthalmology)  Ghazal Duke MD (Otolaryngology)       The following sections were reviewed & updated as appropriate: Problem List, Allergies, PMH, PSH, FH, and SH. Prior to Admission medications    Medication Sig Start Date End Date Taking? Authorizing Provider   buPROPion XL (WELLBUTRIN XL) 300 mg XL tablet Take 1 Tablet by mouth in the morning.  8/16/22  Yes Elli Suresh MD   rosuvastatin (CRESTOR) 10 mg tablet TAKE 1 TABLET AT NIGHT 5/19/22  Yes Elli Suresh MD   alendronate (FOSAMAX) 35 mg tablet Take 1 Tablet by mouth every seven (7) days. 5/19/22  Yes Elli Suresh MD   sertraline (ZOLOFT) 100 mg tablet TAKE 1 & 1/2 TABLETS DAILY 4/14/22  Yes Elli Suresh MD   acetaminophen (TYLENOL) 325 mg tablet Take 2 Tablets by mouth every six (6) hours. 10/15/21  Yes Panda Mcbride NP   cyanocobalamin 1,000 mcg tablet Take 1,000 mcg by mouth nightly. 9/27/21  Yes Elli Suresh MD   ammonium lactate (AMLACTIN) 12 % topical cream  4/23/21  Yes Provider, Historical   spironolactone (ALDACTONE) 50 mg tablet Take 50 mg by mouth nightly. 6/18/21  Yes Provider, Historical   CALCIUM PO Take  by mouth daily. Yes Provider, Historical   cholecalciferol (VITAMIN D3) (1000 Units /25 mcg) tablet Take 2,000 Units by mouth two (2) times a day. Yes Provider, Historical   FEXOFENADINE HCL (ALLEGRA PO) Take 1 Tab by mouth daily as needed. Patient not taking: Reported on 9/27/2022    Provider, Historical          Review of Systems   Constitutional:  Negative for chills and fever. Respiratory:  Negative for cough and shortness of breath. Cardiovascular:  Negative for chest pain and palpitations. Gastrointestinal:  Negative for abdominal pain, blood in stool, constipation, diarrhea, heartburn, nausea and vomiting. Musculoskeletal:  Positive for neck pain. Negative for joint pain (L achilles, seeing ortho) and myalgias. Skin:  Positive for itching (on/off, will be seeing allergist next week, ? dust mites). Neurological:  Positive for tingling (L arm, still an issue from surgery but better.) and sensory change (L leg - no hot/cold sensation). Negative for speech change, focal weakness and headaches. Endo/Heme/Allergies:  Does not bruise/bleed easily. Psychiatric/Behavioral:  Negative for depression.  The patient is not nervous/anxious and does not have insomnia. Objective:   Physical Exam  Constitutional:       General: She is not in acute distress. HENT:      Right Ear: Tympanic membrane, ear canal and external ear normal.      Left Ear: Tympanic membrane, ear canal and external ear normal.   Eyes:      Conjunctiva/sclera: Conjunctivae normal.   Cardiovascular:      Rate and Rhythm: Regular rhythm. Heart sounds: No murmur heard. Pulmonary:      Effort: Pulmonary effort is normal.      Breath sounds: Normal breath sounds. No decreased breath sounds or wheezing. Abdominal:      General: Bowel sounds are normal.      Palpations: Abdomen is soft. There is no hepatomegaly or splenomegaly. Tenderness: no abdominal tenderness   Musculoskeletal:      Right lower leg: No edema. Left lower leg: No edema.    Psychiatric:         Mood and Affect: Mood and affect normal.         Behavior: Behavior normal.          Visit Vitals  /71   Pulse 68   Temp 98.2 °F (36.8 °C) (Temporal)   Resp 18   Ht 5' 6\" (1.676 m)   Wt 177 lb (80.3 kg)   SpO2 98%   BMI 28.57 kg/m²          Konrad Benavidez MD

## 2022-10-12 DIAGNOSIS — F41.1 GAD (GENERALIZED ANXIETY DISORDER): ICD-10-CM

## 2022-10-12 DIAGNOSIS — F33.41 RECURRENT MAJOR DEPRESSIVE DISORDER, IN PARTIAL REMISSION (HCC): ICD-10-CM

## 2022-10-14 RX ORDER — SERTRALINE HYDROCHLORIDE 100 MG/1
TABLET, FILM COATED ORAL
Qty: 135 TABLET | Refills: 1 | Status: SHIPPED | OUTPATIENT
Start: 2022-10-14

## 2022-11-24 DIAGNOSIS — E78.00 HYPERCHOLESTEREMIA: ICD-10-CM

## 2022-11-24 DIAGNOSIS — F33.41 RECURRENT MAJOR DEPRESSIVE DISORDER, IN PARTIAL REMISSION (HCC): ICD-10-CM

## 2022-11-24 DIAGNOSIS — F17.200 TOBACCO DEPENDENCE: ICD-10-CM

## 2022-11-24 DIAGNOSIS — F41.8 DEPRESSION WITH ANXIETY: ICD-10-CM

## 2022-11-24 DIAGNOSIS — F41.1 GAD (GENERALIZED ANXIETY DISORDER): ICD-10-CM

## 2022-11-25 RX ORDER — SERTRALINE HYDROCHLORIDE 100 MG/1
TABLET, FILM COATED ORAL
Qty: 135 TABLET | Refills: 1 | OUTPATIENT
Start: 2022-11-25

## 2022-11-25 RX ORDER — ROSUVASTATIN CALCIUM 10 MG/1
TABLET, COATED ORAL
Qty: 90 TABLET | Refills: 1 | Status: SHIPPED | OUTPATIENT
Start: 2022-11-25

## 2022-11-25 RX ORDER — BUPROPION HYDROCHLORIDE 300 MG/1
300 TABLET ORAL DAILY
Qty: 90 TABLET | Refills: 1 | OUTPATIENT
Start: 2022-11-25

## 2023-02-05 RX ORDER — ALENDRONATE SODIUM 35 MG/1
TABLET ORAL
Qty: 12 TABLET | Refills: 2 | Status: SHIPPED | OUTPATIENT
Start: 2023-02-05

## 2023-02-06 NOTE — TELEPHONE ENCOUNTER
Future Appointments   Date Time Provider Luisito Robbins   9/29/2023  9:00 AM Adriana Lopez MD [facility]

## 2023-03-20 DIAGNOSIS — F41.1 GAD (GENERALIZED ANXIETY DISORDER): ICD-10-CM

## 2023-03-20 DIAGNOSIS — F17.200 TOBACCO DEPENDENCE: ICD-10-CM

## 2023-03-20 DIAGNOSIS — F41.8 DEPRESSION WITH ANXIETY: ICD-10-CM

## 2023-03-20 RX ORDER — BUPROPION HYDROCHLORIDE 300 MG/1
TABLET ORAL
Qty: 90 TABLET | Refills: 1 | Status: SHIPPED | OUTPATIENT
Start: 2023-03-20

## 2023-03-20 NOTE — TELEPHONE ENCOUNTER
Future Appointments   Date Time Provider Luisito Robbins   9/29/2023  9:00 AM Gloria Camacho MD [facility]

## 2023-04-19 NOTE — PATIENT INSTRUCTIONS
It was a pleasure to see you! As discussed:       Lab Review  - Your kidney function was mildly decreased. Increase your fluid intake.   -Your A1c or 3 month marker of your blood sugar is mildly elevated, indicating prediabetes we will recheck this level at your next appointment. In the meantime work on optimizing your diet as we discussed at your appointment. Some labs that may have been tested and their explanation are:  our electrolytes, kidney & liver function (Metabolic Panel)   Anemia, blood cells (CBC)  Thyroid (TSH + T4, T3)  Hormones (prolactin, vitamin D )   Diabetes (Hemoglobin A1c)       Well Visit, Women 48 to 72: Care Instructions  Your Care Instructions    Physical exams can help you stay healthy. Your doctor has checked your overall health and may have suggested ways to take good care of yourself. He or she also may have recommended tests. At home, you can help prevent illness with healthy eating, regular exercise, and other steps. Follow-up care is a key part of your treatment and safety. Be sure to make and go to all appointments, and call your doctor if you are having problems. It's also a good idea to know your test results and keep a list of the medicines you take. How can you care for yourself at home? · Reach and stay at a healthy weight. This will lower your risk for many problems, such as obesity, diabetes, heart disease, and high blood pressure. · Get at least 30 minutes of exercise on most days of the week. Walking is a good choice. You also may want to do other activities, such as running, swimming, cycling, or playing tennis or team sports. · Do not smoke. Smoking can make health problems worse. If you need help quitting, talk to your doctor about stop-smoking programs and medicines. These can increase your chances of quitting for good. · Protect your skin from too much sun.  When you're outdoors from 10 a.m. to 4 p.m., stay in the shade or cover up with clothing and a hat with a wide brim. Wear sunglasses that block UV rays. Even when it's cloudy, put broad-spectrum sunscreen (SPF 30 or higher) on any exposed skin. · See a dentist one or two times a year for checkups and to have your teeth cleaned. · Wear a seat belt in the car. · Limit alcohol to 1 drink a day. Too much alcohol can cause health problems. Follow your doctor's advice about when to have certain tests. These tests can spot problems early. · Cholesterol. Your doctor will tell you how often to have this done based on your age, family history, or other things that can increase your risk for heart attack and stroke. · Blood pressure. Have your blood pressure checked during a routine doctor visit. Your doctor will tell you how often to check your blood pressure based on your age, your blood pressure results, and other factors. · Mammogram. Ask your doctor how often you should have a mammogram, which is an X-ray of your breasts. A mammogram can spot breast cancer before it can be felt and when it is easiest to treat. · Pap test and pelvic exam. Ask your doctor how often you should have a Pap test. You may not need to have a Pap test as often as you used to. · Vision. Have your eyes checked every year or two or as often as your doctor suggests. Some experts recommend that you have yearly exams for glaucoma and other age-related eye problems starting at age 48. · Hearing. Tell your doctor if you notice any change in your hearing. You can have tests to find out how well you hear. · Diabetes. Ask your doctor whether you should have tests for diabetes. · Colon cancer. You should begin tests for colon cancer at age 48. You may have one of several tests. Your doctor will tell you how often to have tests based on your age and risk. Risks include whether you already had a precancerous polyp removed from your colon or whether your parents, sisters and brothers, or children have had colon cancer. · Thyroid disease.  Talk to your doctor about whether to have your thyroid checked as part of a regular physical exam. Women have an increased chance of a thyroid problem. · Osteoporosis. You should begin tests for bone density at age 72. If you are younger than 72, ask your doctor whether you have factors that may increase your risk for this disease. You may want to have this test before age 72. · Heart attack and stroke risk. At least every 4 to 6 years, you should have your risk for heart attack and stroke assessed. Your doctor uses factors such as your age, blood pressure, cholesterol, and whether you smoke or have diabetes to show what your risk for a heart attack or stroke is over the next 10 years. When should you call for help? Watch closely for changes in your health, and be sure to contact your doctor if you have any problems or symptoms that concern you. Where can you learn more? Go to http://zaid-ruddy.info/. Enter L484 in the search box to learn more about \"Well Visit, Women 50 to 72: Care Instructions. \"  Current as of: March 28, 2018  Content Version: 11.9  © 1728-6862 [employer]. Care instructions adapted under license by [employer] (which disclaims liability or warranty for this information). If you have questions about a medical condition or this instruction, always ask your healthcare professional. [address] any warranty or liability for your use of this information. Camille Gu [see HPI] : see HPI [Negative] : Heme/Lymph

## 2023-05-18 DIAGNOSIS — E78.00 PURE HYPERCHOLESTEROLEMIA, UNSPECIFIED: ICD-10-CM

## 2023-05-18 RX ORDER — ROSUVASTATIN CALCIUM 10 MG/1
TABLET, COATED ORAL
Qty: 90 TABLET | Refills: 1 | Status: SHIPPED | OUTPATIENT
Start: 2023-05-18

## 2023-05-18 NOTE — TELEPHONE ENCOUNTER
Future Appointments   Date Time Provider Mino Owen   9/29/2023  9:00 AM Mague Bello MD [facility]

## 2023-05-26 ENCOUNTER — OFFICE VISIT (OUTPATIENT)
Age: 68
End: 2023-05-26
Payer: COMMERCIAL

## 2023-05-26 VITALS
TEMPERATURE: 97.2 F | OXYGEN SATURATION: 98 % | BODY MASS INDEX: 29.63 KG/M2 | SYSTOLIC BLOOD PRESSURE: 123 MMHG | HEIGHT: 66 IN | DIASTOLIC BLOOD PRESSURE: 77 MMHG | RESPIRATION RATE: 16 BRPM | WEIGHT: 184.4 LBS | HEART RATE: 68 BPM

## 2023-05-26 DIAGNOSIS — Z01.818 PRE-OP EXAMINATION: Primary | ICD-10-CM

## 2023-05-26 DIAGNOSIS — Z01.818 PRE-OP EXAMINATION: ICD-10-CM

## 2023-05-26 PROCEDURE — 99214 OFFICE O/P EST MOD 30 MIN: CPT | Performed by: NURSE PRACTITIONER

## 2023-05-26 PROCEDURE — 1123F ACP DISCUSS/DSCN MKR DOCD: CPT | Performed by: NURSE PRACTITIONER

## 2023-05-26 PROCEDURE — 93000 ELECTROCARDIOGRAM COMPLETE: CPT | Performed by: NURSE PRACTITIONER

## 2023-05-26 ASSESSMENT — PATIENT HEALTH QUESTIONNAIRE - PHQ9
SUM OF ALL RESPONSES TO PHQ9 QUESTIONS 1 & 2: 0
2. FEELING DOWN, DEPRESSED OR HOPELESS: 0
1. LITTLE INTEREST OR PLEASURE IN DOING THINGS: 0
SUM OF ALL RESPONSES TO PHQ QUESTIONS 1-9: 0

## 2023-05-26 NOTE — PROGRESS NOTES
Date of Exam: 2023    Allan Costello is a 75 y.o. female (:1955) who presents for preoperative evaluation. Procedure/Surgery:left rotator cuff  Date of Procedure/Surgery: 23  [facility]  Primary Physician: Beverly Christine MD  Latex Allergy: No    Current Outpatient Medications   Medication Sig Dispense Refill    rosuvastatin (CRESTOR) 10 MG tablet TAKE 1 TABLET BY MOUTH AT NIGHT 90 tablet 1    CALCIUM PO Take by mouth daily      acetaminophen (TYLENOL) 325 MG tablet Take 2 tablets by mouth in the morning and 2 tablets at noon and 2 tablets in the evening and 2 tablets before bedtime. alendronate (FOSAMAX) 35 MG tablet TAKE 1 TABLET BY MOUTH EVERY 7 DAYS      ammonium lactate (AMLACTIN) 12 % cream ceived the following from [facility]: Outside name: ammonium lactate (AMLACTIN) 12 % topical cream      buPROPion (WELLBUTRIN XL) 300 MG extended release tablet Take 1 tablet by mouth daily      vitamin D (CHOLECALCIFEROL) 25 MCG (1000 UT) TABS tablet Take 2 tablets by mouth 2 times daily      cyanocobalamin 1000 MCG tablet Take 1 tablet by mouth      sertraline (ZOLOFT) 100 MG tablet TAKE 1 & 1/2 TABLETS DAILY      spironolactone (ALDACTONE) 50 MG tablet Take 1 tablet by mouth       No current facility-administered medications for this visit. Past Medical History:   Diagnosis Date    Anxiety disorder     Cancer ([facility] [address]     SCC, SIDE OF NOSE    Carpal tunnel syndrome of left wrist     Chronic pain     6 MONTHS    DDD (degenerative disc disease), thoracic 10/13/14    Depression     Hypercholesterolemia     Menopause     LMP-?     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 Hare Figures    Osteopenia     Prediabetes 2016    A1C 6.1    Scoliosis 10/13/14    thoracic spine, mild    Sleep apnea     USES CPAP    Vitamin B12 deficiency

## 2023-05-27 LAB
ALBUMIN SERPL-MCNC: 3.8 G/DL (ref 3.5–5)
ALBUMIN/GLOB SERPL: 1.3 (ref 1.1–2.2)
ALP SERPL-CCNC: 86 U/L (ref 45–117)
ALT SERPL-CCNC: 19 U/L (ref 12–78)
ANION GAP SERPL CALC-SCNC: 5 MMOL/L (ref 5–15)
AST SERPL-CCNC: 12 U/L (ref 15–37)
BASOPHILS # BLD: 0 K/UL (ref 0–0.1)
BASOPHILS NFR BLD: 0 % (ref 0–1)
BILIRUB SERPL-MCNC: 0.2 MG/DL (ref 0.2–1)
BUN SERPL-MCNC: 20 MG/DL (ref 6–20)
BUN/CREAT SERPL: 15 (ref 12–20)
CALCIUM SERPL-MCNC: 9.1 MG/DL (ref 8.5–10.1)
CHLORIDE SERPL-SCNC: 113 MMOL/L (ref 97–108)
CO2 SERPL-SCNC: 25 MMOL/L (ref 21–32)
CREAT SERPL-MCNC: 1.34 MG/DL (ref 0.55–1.02)
DIFFERENTIAL METHOD BLD: ABNORMAL
EOSINOPHIL # BLD: 0.3 K/UL (ref 0–0.4)
EOSINOPHIL NFR BLD: 4 % (ref 0–7)
ERYTHROCYTE [DISTWIDTH] IN BLOOD BY AUTOMATED COUNT: 13 % (ref 11.5–14.5)
EST. AVERAGE GLUCOSE BLD GHB EST-MCNC: 126 MG/DL
GLOBULIN SER CALC-MCNC: 2.9 G/DL (ref 2–4)
GLUCOSE SERPL-MCNC: 75 MG/DL (ref 65–100)
HBA1C MFR BLD: 6 % (ref 4–5.6)
HCT VFR BLD AUTO: 39.7 % (ref 35–47)
HGB BLD-MCNC: 12.7 G/DL (ref 11.5–16)
IMM GRANULOCYTES # BLD AUTO: 0 K/UL (ref 0–0.04)
IMM GRANULOCYTES NFR BLD AUTO: 1 % (ref 0–0.5)
LYMPHOCYTES # BLD: 0.8 K/UL (ref 0.8–3.5)
LYMPHOCYTES NFR BLD: 15 % (ref 12–49)
MCH RBC QN AUTO: 28.3 PG (ref 26–34)
MCHC RBC AUTO-ENTMCNC: 32 G/DL (ref 30–36.5)
MCV RBC AUTO: 88.4 FL (ref 80–99)
MONOCYTES # BLD: 0.6 K/UL (ref 0–1)
MONOCYTES NFR BLD: 10 % (ref 5–13)
NEUTS SEG # BLD: 4 K/UL (ref 1.8–8)
NEUTS SEG NFR BLD: 70 % (ref 32–75)
NRBC # BLD: 0 K/UL (ref 0–0.01)
NRBC BLD-RTO: 0 PER 100 WBC
PLATELET # BLD AUTO: 195 K/UL (ref 150–400)
PMV BLD AUTO: 10.3 FL (ref 8.9–12.9)
POTASSIUM SERPL-SCNC: 4.3 MMOL/L (ref 3.5–5.1)
PROT SERPL-MCNC: 6.7 G/DL (ref 6.4–8.2)
RBC # BLD AUTO: 4.49 M/UL (ref 3.8–5.2)
SODIUM SERPL-SCNC: 143 MMOL/L (ref 136–145)
WBC # BLD AUTO: 5.7 K/UL (ref 3.6–11)

## 2023-09-05 SDOH — ECONOMIC STABILITY: HOUSING INSECURITY
IN THE LAST 12 MONTHS, WAS THERE A TIME WHEN YOU DID NOT HAVE A STEADY PLACE TO SLEEP OR SLEPT IN A SHELTER (INCLUDING NOW)?: NO

## 2023-09-05 SDOH — ECONOMIC STABILITY: TRANSPORTATION INSECURITY
IN THE PAST 12 MONTHS, HAS LACK OF TRANSPORTATION KEPT YOU FROM MEETINGS, WORK, OR FROM GETTING THINGS NEEDED FOR DAILY LIVING?: NO

## 2023-09-05 SDOH — ECONOMIC STABILITY: FOOD INSECURITY: WITHIN THE PAST 12 MONTHS, YOU WORRIED THAT YOUR FOOD WOULD RUN OUT BEFORE YOU GOT MONEY TO BUY MORE.: NEVER TRUE

## 2023-09-05 SDOH — ECONOMIC STABILITY: FOOD INSECURITY: WITHIN THE PAST 12 MONTHS, THE FOOD YOU BOUGHT JUST DIDN'T LAST AND YOU DIDN'T HAVE MONEY TO GET MORE.: NEVER TRUE

## 2023-09-06 ENCOUNTER — OFFICE VISIT (OUTPATIENT)
Age: 68
End: 2023-09-06
Payer: COMMERCIAL

## 2023-09-06 ENCOUNTER — PATIENT MESSAGE (OUTPATIENT)
Age: 68
End: 2023-09-06

## 2023-09-06 VITALS
SYSTOLIC BLOOD PRESSURE: 124 MMHG | OXYGEN SATURATION: 97 % | RESPIRATION RATE: 16 BRPM | BODY MASS INDEX: 29.86 KG/M2 | HEART RATE: 73 BPM | DIASTOLIC BLOOD PRESSURE: 84 MMHG | TEMPERATURE: 97.5 F | HEIGHT: 66 IN | WEIGHT: 185.8 LBS

## 2023-09-06 DIAGNOSIS — F33.41 MAJOR DEPRESSIVE DISORDER, RECURRENT, IN PARTIAL REMISSION (HCC): ICD-10-CM

## 2023-09-06 DIAGNOSIS — E78.00 PURE HYPERCHOLESTEROLEMIA, UNSPECIFIED: ICD-10-CM

## 2023-09-06 DIAGNOSIS — R60.0 BILATERAL LOWER EXTREMITY EDEMA: Primary | ICD-10-CM

## 2023-09-06 DIAGNOSIS — F41.1 GENERALIZED ANXIETY DISORDER: ICD-10-CM

## 2023-09-06 DIAGNOSIS — F41.8 OTHER SPECIFIED ANXIETY DISORDERS: ICD-10-CM

## 2023-09-06 PROCEDURE — 1123F ACP DISCUSS/DSCN MKR DOCD: CPT | Performed by: NURSE PRACTITIONER

## 2023-09-06 PROCEDURE — 99213 OFFICE O/P EST LOW 20 MIN: CPT | Performed by: NURSE PRACTITIONER

## 2023-09-06 RX ORDER — SPIRONOLACTONE 50 MG/1
50 TABLET, FILM COATED ORAL DAILY
Qty: 90 TABLET | Refills: 1 | Status: SHIPPED | OUTPATIENT
Start: 2023-09-06

## 2023-09-07 RX ORDER — SERTRALINE HYDROCHLORIDE 100 MG/1
TABLET, FILM COATED ORAL
Qty: 135 TABLET | Refills: 0 | Status: SHIPPED | OUTPATIENT
Start: 2023-09-07

## 2023-09-07 RX ORDER — BUPROPION HYDROCHLORIDE 300 MG/1
TABLET ORAL
Qty: 90 TABLET | Refills: 0 | Status: SHIPPED | OUTPATIENT
Start: 2023-09-07

## 2023-09-07 RX ORDER — ALENDRONATE SODIUM 35 MG/1
TABLET ORAL
Qty: 12 TABLET | Refills: 0 | Status: SHIPPED | OUTPATIENT
Start: 2023-09-07

## 2023-09-07 RX ORDER — ROSUVASTATIN CALCIUM 10 MG/1
TABLET, COATED ORAL
Qty: 90 TABLET | Refills: 0 | Status: SHIPPED | OUTPATIENT
Start: 2023-09-07

## 2023-09-07 NOTE — TELEPHONE ENCOUNTER
Future Appointments   Date Time Provider [address]   9/29/2023  9:00 AM Geoff Wynn MD [facility]

## 2023-09-20 DIAGNOSIS — Z00.00 ROUTINE PHYSICAL EXAMINATION: Primary | ICD-10-CM

## 2023-09-20 NOTE — PROGRESS NOTES
Future Appointments   Date Time Provider [address]   9/29/2023  9:00 AM Dianelys Hanson MD [facility]

## 2023-09-25 DIAGNOSIS — Z00.00 ROUTINE PHYSICAL EXAMINATION: ICD-10-CM

## 2023-09-25 LAB
ALBUMIN SERPL-MCNC: 3.7 G/DL (ref 3.5–5)
ALBUMIN/GLOB SERPL: 1.4 (ref 1.1–2.2)
ALP SERPL-CCNC: 88 U/L (ref 45–117)
ALT SERPL-CCNC: 26 U/L (ref 12–78)
ANION GAP SERPL CALC-SCNC: 2 MMOL/L (ref 5–15)
AST SERPL-CCNC: 13 U/L (ref 15–37)
BILIRUB SERPL-MCNC: 0.3 MG/DL (ref 0.2–1)
BUN SERPL-MCNC: 22 MG/DL (ref 6–20)
BUN/CREAT SERPL: 15 (ref 12–20)
CALCIUM SERPL-MCNC: 9.4 MG/DL (ref 8.5–10.1)
CHLORIDE SERPL-SCNC: 111 MMOL/L (ref 97–108)
CHOLEST SERPL-MCNC: 167 MG/DL
CO2 SERPL-SCNC: 28 MMOL/L (ref 21–32)
CREAT SERPL-MCNC: 1.51 MG/DL (ref 0.55–1.02)
ERYTHROCYTE [DISTWIDTH] IN BLOOD BY AUTOMATED COUNT: 13.1 % (ref 11.5–14.5)
GLOBULIN SER CALC-MCNC: 2.7 G/DL (ref 2–4)
GLUCOSE SERPL-MCNC: 131 MG/DL (ref 65–100)
HCT VFR BLD AUTO: 39.7 % (ref 35–47)
HDLC SERPL-MCNC: 65 MG/DL
HDLC SERPL: 2.6 (ref 0–5)
HGB BLD-MCNC: 12.7 G/DL (ref 11.5–16)
LDLC SERPL CALC-MCNC: 77 MG/DL (ref 0–100)
MCH RBC QN AUTO: 28.7 PG (ref 26–34)
MCHC RBC AUTO-ENTMCNC: 32 G/DL (ref 30–36.5)
MCV RBC AUTO: 89.8 FL (ref 80–99)
NRBC # BLD: 0 K/UL (ref 0–0.01)
NRBC BLD-RTO: 0 PER 100 WBC
PLATELET # BLD AUTO: 187 K/UL (ref 150–400)
PMV BLD AUTO: 9.6 FL (ref 8.9–12.9)
POTASSIUM SERPL-SCNC: 4.8 MMOL/L (ref 3.5–5.1)
PROT SERPL-MCNC: 6.4 G/DL (ref 6.4–8.2)
RBC # BLD AUTO: 4.42 M/UL (ref 3.8–5.2)
SODIUM SERPL-SCNC: 141 MMOL/L (ref 136–145)
T4 FREE SERPL-MCNC: 0.8 NG/DL (ref 0.8–1.5)
TRIGL SERPL-MCNC: 125 MG/DL
TSH SERPL DL<=0.05 MIU/L-ACNC: 4.31 UIU/ML (ref 0.36–3.74)
VIT B12 SERPL-MCNC: 1692 PG/ML (ref 193–986)
VLDLC SERPL CALC-MCNC: 25 MG/DL
WBC # BLD AUTO: 6.3 K/UL (ref 3.6–11)

## 2023-09-26 NOTE — RESULT ENCOUNTER NOTE
Results released to patient via [employer]. All labs are stable or at goal for her, except for renal insufficiency (new, possible abx induced). High but improving TSH. FBS in DM range. She has f/u on 9/29 to review.

## 2023-09-29 ENCOUNTER — OFFICE VISIT (OUTPATIENT)
Age: 68
End: 2023-09-29

## 2023-09-29 VITALS
HEART RATE: 85 BPM | RESPIRATION RATE: 16 BRPM | SYSTOLIC BLOOD PRESSURE: 125 MMHG | DIASTOLIC BLOOD PRESSURE: 84 MMHG | TEMPERATURE: 97.2 F | BODY MASS INDEX: 29.99 KG/M2 | HEIGHT: 65 IN | OXYGEN SATURATION: 98 % | WEIGHT: 180 LBS

## 2023-09-29 DIAGNOSIS — F33.42 RECURRENT MAJOR DEPRESSIVE DISORDER, IN FULL REMISSION (HCC): ICD-10-CM

## 2023-09-29 DIAGNOSIS — E03.8 SUBCLINICAL HYPOTHYROIDISM: ICD-10-CM

## 2023-09-29 DIAGNOSIS — F41.1 GAD (GENERALIZED ANXIETY DISORDER): ICD-10-CM

## 2023-09-29 DIAGNOSIS — Z12.31 ENCOUNTER FOR SCREENING MAMMOGRAM FOR MALIGNANT NEOPLASM OF BREAST: ICD-10-CM

## 2023-09-29 DIAGNOSIS — E78.00 HYPERCHOLESTEREMIA: ICD-10-CM

## 2023-09-29 DIAGNOSIS — M85.89 OSTEOPENIA OF MULTIPLE SITES: ICD-10-CM

## 2023-09-29 DIAGNOSIS — Z71.89 ADVANCED CARE PLANNING/COUNSELING DISCUSSION: ICD-10-CM

## 2023-09-29 DIAGNOSIS — Z23 ENCOUNTER FOR IMMUNIZATION: ICD-10-CM

## 2023-09-29 DIAGNOSIS — N28.9 RENAL INSUFFICIENCY: ICD-10-CM

## 2023-09-29 DIAGNOSIS — R41.3 MEMORY CHANGES: ICD-10-CM

## 2023-09-29 DIAGNOSIS — R73.03 PREDIABETES: ICD-10-CM

## 2023-09-29 DIAGNOSIS — Z00.00 ROUTINE PHYSICAL EXAMINATION: Primary | ICD-10-CM

## 2023-09-29 RX ORDER — LANOLIN ALCOHOL/MO/W.PET/CERES
1 CREAM (GRAM) TOPICAL DAILY
COMMUNITY

## 2023-09-29 SDOH — ECONOMIC STABILITY: INCOME INSECURITY: HOW HARD IS IT FOR YOU TO PAY FOR THE VERY BASICS LIKE FOOD, HOUSING, MEDICAL CARE, AND HEATING?: NOT HARD AT ALL

## 2023-09-29 SDOH — ECONOMIC STABILITY: FOOD INSECURITY: WITHIN THE PAST 12 MONTHS, YOU WORRIED THAT YOUR FOOD WOULD RUN OUT BEFORE YOU GOT MONEY TO BUY MORE.: NEVER TRUE

## 2023-09-29 SDOH — ECONOMIC STABILITY: FOOD INSECURITY: WITHIN THE PAST 12 MONTHS, THE FOOD YOU BOUGHT JUST DIDN'T LAST AND YOU DIDN'T HAVE MONEY TO GET MORE.: NEVER TRUE

## 2023-09-29 ASSESSMENT — ENCOUNTER SYMPTOMS
NAUSEA: 0
DIARRHEA: 0
SHORTNESS OF BREATH: 0
COUGH: 0
ABDOMINAL PAIN: 0
CONSTIPATION: 0
VOMITING: 0
BLOOD IN STOOL: 0

## 2023-09-29 ASSESSMENT — LIFESTYLE VARIABLES
HOW MANY STANDARD DRINKS CONTAINING ALCOHOL DO YOU HAVE ON A TYPICAL DAY: 1 OR 2
HOW OFTEN DO YOU HAVE A DRINK CONTAINING ALCOHOL: MONTHLY OR LESS

## 2023-09-29 ASSESSMENT — PATIENT HEALTH QUESTIONNAIRE - PHQ9
9. THOUGHTS THAT YOU WOULD BE BETTER OFF DEAD, OR OF HURTING YOURSELF: 0
SUM OF ALL RESPONSES TO PHQ QUESTIONS 1-9: 7
8. MOVING OR SPEAKING SO SLOWLY THAT OTHER PEOPLE COULD HAVE NOTICED. OR THE OPPOSITE, BEING SO FIGETY OR RESTLESS THAT YOU HAVE BEEN MOVING AROUND A LOT MORE THAN USUAL: 0
SUM OF ALL RESPONSES TO PHQ QUESTIONS 1-9: 0
2. FEELING DOWN, DEPRESSED OR HOPELESS: 0
3. TROUBLE FALLING OR STAYING ASLEEP: 1
SUM OF ALL RESPONSES TO PHQ QUESTIONS 1-9: 0
SUM OF ALL RESPONSES TO PHQ QUESTIONS 1-9: 0
7. TROUBLE CONCENTRATING ON THINGS, SUCH AS READING THE NEWSPAPER OR WATCHING TELEVISION: 2
4. FEELING TIRED OR HAVING LITTLE ENERGY: 1
10. IF YOU CHECKED OFF ANY PROBLEMS, HOW DIFFICULT HAVE THESE PROBLEMS MADE IT FOR YOU TO DO YOUR WORK, TAKE CARE OF THINGS AT HOME, OR GET ALONG WITH OTHER PEOPLE: 1
2. FEELING DOWN, DEPRESSED OR HOPELESS: 0
1. LITTLE INTEREST OR PLEASURE IN DOING THINGS: 0
SUM OF ALL RESPONSES TO PHQ9 QUESTIONS 1 & 2: 0
1. LITTLE INTEREST OR PLEASURE IN DOING THINGS: 0
SUM OF ALL RESPONSES TO PHQ QUESTIONS 1-9: 0
SUM OF ALL RESPONSES TO PHQ QUESTIONS 1-9: 7
SUM OF ALL RESPONSES TO PHQ QUESTIONS 1-9: 7
6. FEELING BAD ABOUT YOURSELF - OR THAT YOU ARE A FAILURE OR HAVE LET YOURSELF OR YOUR FAMILY DOWN: 1
SUM OF ALL RESPONSES TO PHQ9 QUESTIONS 1 & 2: 0
5. POOR APPETITE OR OVEREATING: 2
SUM OF ALL RESPONSES TO PHQ QUESTIONS 1-9: 7

## 2023-09-29 ASSESSMENT — ANXIETY QUESTIONNAIRES
5. BEING SO RESTLESS THAT IT IS HARD TO SIT STILL: 0
2. NOT BEING ABLE TO STOP OR CONTROL WORRYING: 1
3. WORRYING TOO MUCH ABOUT DIFFERENT THINGS: 1
GAD7 TOTAL SCORE: 4
6. BECOMING EASILY ANNOYED OR IRRITABLE: 0
4. TROUBLE RELAXING: 0
IF YOU CHECKED OFF ANY PROBLEMS ON THIS QUESTIONNAIRE, HOW DIFFICULT HAVE THESE PROBLEMS MADE IT FOR YOU TO DO YOUR WORK, TAKE CARE OF THINGS AT HOME, OR GET ALONG WITH OTHER PEOPLE: SOMEWHAT DIFFICULT
1. FEELING NERVOUS, ANXIOUS, OR ON EDGE: 1
7. FEELING AFRAID AS IF SOMETHING AWFUL MIGHT HAPPEN: 1

## 2023-09-29 NOTE — PROGRESS NOTES
Angella Friend is a 75 y.o. female who was seen in clinic today (9/29/2023) for a full physical.        Assessment & Plan:   Below is the assessment and plan developed based on review of pertinent history, physical exam, labs, studies, and medications. 1. Routine physical examination  -     Basic Metabolic Panel; Future  -     Hemoglobin A1C; Future  2. Advanced care planning/counseling discussion  -     FULL CODE  3. Encounter for immunization  -     pneumococcal 20-valent conjugat (PREVNAR) 0.5 ML FLORESITA inj; Inject 0.5 mLs into the muscle once for 1 dose, Disp-0.5 mL, R-0Print  4. Encounter for screening mammogram for malignant neoplasm of breast  5. Osteopenia of multiple sites  Assessment & Plan:  Asymptomatic, overdue for imaging, will hold off while mammo issues are sorted out and do both at the same time   6. Recurrent major depressive disorder, in full remission [facility])  Assessment & Plan:  Stable from last year, could be better controlled, I reviewed treatment options with her, I recommended to see a counselor, I reviewed life style changes to help improve mood, continue current treatment. Did recommend increasing welbutrin but declined  7. ANDRIA (generalized anxiety disorder)  Assessment & Plan:  Fluctuating but overall stable, sounds more depression related. I reviewed treatment options with her, I reviewed life style changes to help improve mood, continue current treatment. 8. Hypercholesteremia  Assessment & Plan:  at goal, continue current treatment    9. Prediabetes  Assessment & Plan:  Unclear control, FBS in DM range, will repeat FBS and check A1c   Orders:  -     Basic Metabolic Panel; Future  -     Hemoglobin A1C; Future  10. Subclinical hypothyroidism  Assessment & Plan:  at goal, continue current treatment    11.  Renal insufficiency  Comments:  new dx, likely due to abx (doxy), will repeat next week, will increase PO intake by 1 glass of water per day  Orders:  -     Basic Metabolic Panel;

## 2023-09-29 NOTE — ASSESSMENT & PLAN NOTE
Stable from last year, could be better controlled, I reviewed treatment options with her, I recommended to see a counselor, I reviewed life style changes to help improve mood, continue current treatment.   Did recommend increasing welbutrin but declined

## 2023-09-29 NOTE — ASSESSMENT & PLAN NOTE
Fluctuating but overall stable, sounds more depression related. I reviewed treatment options with her, I reviewed life style changes to help improve mood, continue current treatment.

## 2023-09-29 NOTE — ASSESSMENT & PLAN NOTE
Asymptomatic, overdue for imaging, will hold off while mammo issues are sorted out and do both at the same time

## 2023-09-29 NOTE — ACP (ADVANCE CARE PLANNING)
Advance Care Planning   Advance Care Planning (ACP) Physician/NP/PA (Provider) Conversation    Date of ACP Conversation: 09/29/23  Persons included in Conversation:  patient  Length of ACP Conversation in minutes: <16 minutes (Non-Billable)    Has ACP document(s) on file - reflects the patient's care preferences.   She elects Full Code (Attempt Resuscitation)    The patient has appointed the following active healthcare agents:    Primary Decision MakerLiseemabemanuela Led Spouse - [phone]    Secondary Decision Maker: John Rodriguez - Child - [phone]    Secondary Decision Maker: Ann Hernandez - Child - [phone]     The Patient has the following current code status:    Code Status: Full Code    Stone Neely MD

## 2023-10-02 ENCOUNTER — TRANSCRIBE ORDERS (OUTPATIENT)
Facility: HOSPITAL | Age: 68
End: 2023-10-02

## 2023-10-02 DIAGNOSIS — Z12.31 VISIT FOR SCREENING MAMMOGRAM: Primary | ICD-10-CM

## 2023-10-06 ENCOUNTER — HOSPITAL ENCOUNTER (OUTPATIENT)
Age: 68
End: 2023-10-06
Payer: MEDICARE

## 2023-10-06 VITALS — HEIGHT: 65 IN | WEIGHT: 179 LBS | BODY MASS INDEX: 29.82 KG/M2

## 2023-10-06 DIAGNOSIS — Z12.31 VISIT FOR SCREENING MAMMOGRAM: ICD-10-CM

## 2023-10-06 PROCEDURE — 77063 BREAST TOMOSYNTHESIS BI: CPT

## 2023-10-20 DIAGNOSIS — N28.9 RENAL INSUFFICIENCY: ICD-10-CM

## 2023-10-20 DIAGNOSIS — Z00.00 ROUTINE PHYSICAL EXAMINATION: ICD-10-CM

## 2023-10-20 DIAGNOSIS — R73.03 PREDIABETES: ICD-10-CM

## 2023-10-20 LAB
ANION GAP SERPL CALC-SCNC: 4 MMOL/L (ref 5–15)
BUN SERPL-MCNC: 20 MG/DL (ref 6–20)
BUN/CREAT SERPL: 13 (ref 12–20)
CALCIUM SERPL-MCNC: 9.2 MG/DL (ref 8.5–10.1)
CHLORIDE SERPL-SCNC: 107 MMOL/L (ref 97–108)
CO2 SERPL-SCNC: 28 MMOL/L (ref 21–32)
CREAT SERPL-MCNC: 1.6 MG/DL (ref 0.55–1.02)
EST. AVERAGE GLUCOSE BLD GHB EST-MCNC: 131 MG/DL
GLUCOSE SERPL-MCNC: 95 MG/DL (ref 65–100)
HBA1C MFR BLD: 6.2 % (ref 4–5.6)
POTASSIUM SERPL-SCNC: 4.8 MMOL/L (ref 3.5–5.1)
SODIUM SERPL-SCNC: 139 MMOL/L (ref 136–145)

## 2023-10-31 ENCOUNTER — PATIENT MESSAGE (OUTPATIENT)
Age: 68
End: 2023-10-31

## 2023-10-31 DIAGNOSIS — E78.00 PURE HYPERCHOLESTEROLEMIA, UNSPECIFIED: ICD-10-CM

## 2023-10-31 DIAGNOSIS — F41.8 OTHER SPECIFIED ANXIETY DISORDERS: ICD-10-CM

## 2023-10-31 DIAGNOSIS — F33.41 MAJOR DEPRESSIVE DISORDER, RECURRENT, IN PARTIAL REMISSION (HCC): ICD-10-CM

## 2023-10-31 DIAGNOSIS — F41.1 GENERALIZED ANXIETY DISORDER: ICD-10-CM

## 2023-11-01 NOTE — TELEPHONE ENCOUNTER
From: Claribel Mccarthy  Sent: 10/31/2023 3:31 PM EDT  To: Renetta Freitas [facility] Clinical Staff  Subject: [employer] requesting prescription OK    I think [employer] needs the ok to begin sending the medications.

## 2023-11-01 NOTE — TELEPHONE ENCOUNTER
No chief complaint on file. Last Appointment with Dr. Nadege Souza: 9/29/23  No future appointments.

## 2023-11-02 RX ORDER — BUPROPION HYDROCHLORIDE 300 MG/1
300 TABLET ORAL EVERY MORNING
Qty: 90 TABLET | Refills: 3 | Status: SHIPPED | OUTPATIENT
Start: 2023-11-02

## 2023-11-02 RX ORDER — ROSUVASTATIN CALCIUM 10 MG/1
10 TABLET, COATED ORAL NIGHTLY
Qty: 90 TABLET | Refills: 3 | Status: SHIPPED | OUTPATIENT
Start: 2023-11-02

## 2023-11-02 RX ORDER — SERTRALINE HYDROCHLORIDE 100 MG/1
150 TABLET, FILM COATED ORAL DAILY
Qty: 135 TABLET | Refills: 3 | Status: SHIPPED | OUTPATIENT
Start: 2023-11-02

## 2023-11-02 RX ORDER — LANOLIN ALCOHOL/MO/W.PET/CERES
1000 CREAM (GRAM) TOPICAL DAILY
Qty: 90 TABLET | Refills: 3 | Status: SHIPPED | OUTPATIENT
Start: 2023-11-02

## 2023-12-03 DIAGNOSIS — F41.1 GENERALIZED ANXIETY DISORDER: ICD-10-CM

## 2023-12-03 DIAGNOSIS — F41.8 OTHER SPECIFIED ANXIETY DISORDERS: ICD-10-CM

## 2023-12-03 RX ORDER — BUPROPION HYDROCHLORIDE 300 MG/1
300 TABLET ORAL EVERY MORNING
Qty: 90 TABLET | Refills: 3 | OUTPATIENT
Start: 2023-12-03

## 2023-12-29 DIAGNOSIS — F33.41 MAJOR DEPRESSIVE DISORDER, RECURRENT, IN PARTIAL REMISSION (HCC): ICD-10-CM

## 2023-12-29 DIAGNOSIS — F41.1 GENERALIZED ANXIETY DISORDER: ICD-10-CM

## 2023-12-29 RX ORDER — SERTRALINE HYDROCHLORIDE 100 MG/1
TABLET, FILM COATED ORAL
Qty: 135 TABLET | Refills: 3 | OUTPATIENT
Start: 2023-12-29

## 2023-12-29 NOTE — TELEPHONE ENCOUNTER
Reason for call:  TC from pt. Pt id verified. Pt returning nurse call.     Is this a new problem: No    Date of last appointment:  9/29/2023     Can we respond via "[employer]": No    Best call back number: [phone]

## 2023-12-29 NOTE — TELEPHONE ENCOUNTER
Left message for patient to return call, will confirm pharmacy as pt has refills remaining with mail order company.

## 2024-01-21 ASSESSMENT — SLEEP AND FATIGUE QUESTIONNAIRES
DO YOU HAVE DIFFICULTY BEING AS ACTIVE AS YOU WANT TO BE IN THE MORNING BECAUSE YOU ARE SLEEPY OR TIRED: YES, LITTLE
HOW LIKELY ARE YOU TO NOD OFF OR FALL ASLEEP WHILE SITTING AND TALKING TO SOMEONE: 0
HOW LIKELY ARE YOU TO NOD OFF OR FALL ASLEEP WHILE SITTING INACTIVE IN A PUBLIC PLACE: 1
HOW LIKELY ARE YOU TO NOD OFF OR FALL ASLEEP WHEN YOU ARE A PASSENGER IN A CAR FOR AN HOUR WITHOUT A BREAK: SLIGHT CHANCE OF DOZING
HOW LIKELY ARE YOU TO NOD OFF OR FALL ASLEEP WHILE LYING DOWN TO REST IN THE AFTERNOON WHEN CIRCUMSTANCES PERMIT: 2
DO YOU GENERALLY HAVE DIFFICULTY REMEMBERING THINGS BECAUSE YOU ARE SLEEPY OR TIRED: YES, MODERATE
DO YOU HAVE DIFFICULTY CONCENTRATING ON THE THINGS YOU DO BECAUSE YOU ARE SLEEPY OR TIRED: YES, MODERATE
DO YOU HAVE DIFFICULTY VISITING YOUR FAMILY OR FRIENDS IN THEIR HOME BECAUSE YOU BECOME SLEEPY OR TIRED: YES, A LITTLE
FOSQ SCORE: 12.5
HOW LIKELY ARE YOU TO NOD OFF OR FALL ASLEEP IN A CAR, WHILE STOPPED FOR A FEW MINUTES IN TRAFFIC: 1
DO YOU HAVE DIFFICULTY BEING AS ACTIVE AS YOU WANT TO BE IN THE EVENING BECAUSE YOU ARE SLEEPY OR TIRED: YES, MODERATE
HAS YOUR RELATIONSHIP WITH FAMILY, FRIENDS OR WORK COLLEAGUES BEEN AFFECTED BECAUSE YOU ARE SLEEPY OR TIRED: NO
DO YOU HAVE DIFFICULTY OPERATING A MOTOR VEHICLE FOR SHORT DISTANCES (LESS THAN 100 MILES) BECAUSE YOU BECOME SLEEPY: YES, MODERATE
HOW LIKELY ARE YOU TO NOD OFF OR FALL ASLEEP WHILE SITTING AND READING: 3
HOW LIKELY ARE YOU TO NOD OFF OR FALL ASLEEP WHILE WATCHING TV: MODERATE CHANCE OF DOZING
DO YOU HAVE DIFFICULTY OPERATING A MOTOR VEHICLE FOR LONG DISTANCES (GREATER THAN 100 MILES) BECAUSE YOU BECOME SLEEPY: YES, EXTREME
HOW LIKELY ARE YOU TO NOD OFF OR FALL ASLEEP WHEN YOU ARE A PASSENGER IN A CAR FOR AN HOUR WITHOUT A BREAK: 1
HOW LIKELY ARE YOU TO NOD OFF OR FALL ASLEEP WHILE SITTING QUIETLY AFTER LUNCH WITHOUT ALCOHOL: MODERATE CHANCE OF DOZING
HOW LIKELY ARE YOU TO NOD OFF OR FALL ASLEEP WHILE WATCHING TV: 2
ESS TOTAL SCORE: 12
DO YOU HAVE DIFFICULTY WATCHING A MOVIE OR VIDEO BECAUSE YOU BECOME SLEEPY OR TIRED: YES, A LITTLE
HOW LIKELY ARE YOU TO NOD OFF OR FALL ASLEEP WHILE SITTING INACTIVE IN A PUBLIC PLACE: SLIGHT CHANCE OF DOZING
HOW LIKELY ARE YOU TO NOD OFF OR FALL ASLEEP WHILE SITTING AND TALKING TO SOMEONE: WOULD NEVER DOZE
HOW LIKELY ARE YOU TO NOD OFF OR FALL ASLEEP IN A CAR, WHILE STOPPED FOR A FEW MINUTES IN TRAFFIC: SLIGHT CHANCE OF DOZING
HOW LIKELY ARE YOU TO NOD OFF OR FALL ASLEEP WHILE LYING DOWN TO REST IN THE AFTERNOON WHEN CIRCUMSTANCES PERMIT: MODERATE CHANCE OF DOZING
HAS YOUR MOOD BEEN AFFECTED BECAUSE YOU ARE SLEEPY OR TIRED: YES, LITTLE
HOW LIKELY ARE YOU TO NOD OFF OR FALL ASLEEP WHILE SITTING QUIETLY AFTER LUNCH WITHOUT ALCOHOL: 2
HOW LIKELY ARE YOU TO NOD OFF OR FALL ASLEEP WHILE SITTING AND READING: HIGH CHANCE OF DOZING

## 2024-01-23 NOTE — PROGRESS NOTES
[address]   [place] [address]   Tel.  [phone]   Fax. [phone]  [address]   [place] [address]   Tel.  [phone]   Fax. [phone] [address] [place] [address]   Tel.  [phone]   Fax. [phone]     Daria Baez (: 1955) is a 68 y.o. female, established patient, seen for positive airway pressure follow-up, she was last seen by Kimberley Burciaga NP on 2021, previously seen by Dr. Carrasquillo on 2019, prior notes reviewed in detail. Home sleep test 2018 showed AHI of 14.2/hr with a lowest SaO2 of 80%. device set up 3/5/2020. She is seen today for follow up.      ASSESSMENT/PLAN:   Diagnosis Orders   1. FADY (obstructive sleep apnea)  DME Order for (Specify) as OP      2. BMI 29.0-29.9,adult          AHI = 14.2 (2018). On Respironics APAP : 7-12 cmH2O. Set up 3/5/2020.     She is adherent with PAP therapy and PAP continues to benefit patient and remains necessary for control of her sleep apnea.     No follow-up provider specified.    Sleep Apnea -  continue on current pressures.     Orders Placed This Encounter   Procedures    DME Order for (Specify) as OP     Diagnosis: (G47.33) FADY (obstructive sleep apnea)  (primary encounter diagnosis)     Replacement Supplies for Positive Airway Pressure Therapy Device:   Duration of need: 99 months.      Nasal Pillows Combo Mask (Replace) 2 per month.   Nasal Pillows (Replace) 2 per month.    Nasal Cushion (Replace) 2 per month.   Nasal Interface Mask 1 every 3 months.       Headgear 1 every 6 months.   Positive Airway Pressure chinstrap 1 every 6 months.     Tubing with heating element 1 every 3 months.     Filter(s) Disposable 2 per month.   Filter(s) Non-Disposable 1 every 6 months.   .    Water Chamber for Humidifier (Replace) 1 every 6 months.    Alea Wilson, [facility] NPI: [id number]    Electronically signed. Date:-

## 2024-01-24 ENCOUNTER — TELEMEDICINE (OUTPATIENT)
Age: 69
End: 2024-01-24
Payer: COMMERCIAL

## 2024-01-24 DIAGNOSIS — G47.33 OSA (OBSTRUCTIVE SLEEP APNEA): Primary | ICD-10-CM

## 2024-01-24 PROCEDURE — G8399 PT W/DXA RESULTS DOCUMENT: HCPCS | Performed by: NURSE PRACTITIONER

## 2024-01-24 PROCEDURE — G8419 CALC BMI OUT NRM PARAM NOF/U: HCPCS | Performed by: NURSE PRACTITIONER

## 2024-01-24 PROCEDURE — G8427 DOCREV CUR MEDS BY ELIG CLIN: HCPCS | Performed by: NURSE PRACTITIONER

## 2024-01-24 PROCEDURE — 1090F PRES/ABSN URINE INCON ASSESS: CPT | Performed by: NURSE PRACTITIONER

## 2024-01-24 PROCEDURE — 1036F TOBACCO NON-USER: CPT | Performed by: NURSE PRACTITIONER

## 2024-01-24 PROCEDURE — G8484 FLU IMMUNIZE NO ADMIN: HCPCS | Performed by: NURSE PRACTITIONER

## 2024-01-24 PROCEDURE — 1123F ACP DISCUSS/DSCN MKR DOCD: CPT | Performed by: NURSE PRACTITIONER

## 2024-01-24 PROCEDURE — 3017F COLORECTAL CA SCREEN DOC REV: CPT | Performed by: NURSE PRACTITIONER

## 2024-01-24 PROCEDURE — 99213 OFFICE O/P EST LOW 20 MIN: CPT | Performed by: NURSE PRACTITIONER

## 2024-01-24 NOTE — PATIENT INSTRUCTIONS
[address]  [place] [address]  Tel.  [phone]  Fax. [phone] [address]  [place] [address]  Tel.  [phone]  Fax. [phone] [address] [place] [address]  Tel.  [phone]  Fax. [phone]     Learning About CPAP for Sleep Apnea  What is CPAP?              CPAP is a small machine that you use at home every night while you sleep. It increases air pressure in your throat to keep your airway open. When you have sleep apnea, this can help you sleep better so you feel much better. CPAP stands for \"continuous positive airway pressure.\"  The CPAP machine will have one of the following:  A mask that covers your nose and mouth  Prongs that fit into your nose  A mask that covers your nose only, the most common type. This type is called NCPAP. The N stands for \"nasal.\"  Why is it done?  CPAP is usually the best treatment for obstructive sleep apnea. It is the first treatment choice and the most widely used. Your doctor may suggest CPAP if you have:  Moderate to severe sleep apnea.  Sleep apnea and coronary artery disease (CAD) or heart failure.  How does it help?  CPAP can help you have more normal sleep, so you feel less sleepy and more alert during the daytime.  CPAP may help keep heart failure or other heart problems from getting worse.  NCPAP may help lower your blood pressure.  If you use CPAP, your bed partner may also sleep better because you are not snoring or restless.  What are the side effects?  Some people who use CPAP have:  A dry or stuffy nose and a sore throat.  Irritated skin on the face.  Sore eyes.  Bloating.  If you have any of these problems, work with your doctor to fix them. Here are some things you can try:  Be sure the mask or nasal prongs fit well.  See if your doctor can adjust the pressure of your CPAP.  If your nose is dry, try a humidifier.  If your nose is runny or stuffy, try decongestant medicine or a steroid

## 2024-01-25 ENCOUNTER — CLINICAL DOCUMENTATION (OUTPATIENT)
Age: 69
End: 2024-01-25

## 2024-02-14 ENCOUNTER — OFFICE VISIT (OUTPATIENT)
Age: 69
End: 2024-02-14
Payer: MEDICARE

## 2024-02-14 VITALS
TEMPERATURE: 97.5 F | RESPIRATION RATE: 16 BRPM | HEART RATE: 68 BPM | OXYGEN SATURATION: 99 % | DIASTOLIC BLOOD PRESSURE: 77 MMHG | HEIGHT: 65 IN | WEIGHT: 184 LBS | SYSTOLIC BLOOD PRESSURE: 130 MMHG | BODY MASS INDEX: 30.66 KG/M2

## 2024-02-14 DIAGNOSIS — J01.00 ACUTE NON-RECURRENT MAXILLARY SINUSITIS: Primary | ICD-10-CM

## 2024-02-14 PROCEDURE — 1036F TOBACCO NON-USER: CPT | Performed by: NURSE PRACTITIONER

## 2024-02-14 PROCEDURE — G8484 FLU IMMUNIZE NO ADMIN: HCPCS | Performed by: NURSE PRACTITIONER

## 2024-02-14 PROCEDURE — 99213 OFFICE O/P EST LOW 20 MIN: CPT | Performed by: NURSE PRACTITIONER

## 2024-02-14 PROCEDURE — 1090F PRES/ABSN URINE INCON ASSESS: CPT | Performed by: NURSE PRACTITIONER

## 2024-02-14 PROCEDURE — 1123F ACP DISCUSS/DSCN MKR DOCD: CPT | Performed by: NURSE PRACTITIONER

## 2024-02-14 PROCEDURE — G8417 CALC BMI ABV UP PARAM F/U: HCPCS | Performed by: NURSE PRACTITIONER

## 2024-02-14 PROCEDURE — 3017F COLORECTAL CA SCREEN DOC REV: CPT | Performed by: NURSE PRACTITIONER

## 2024-02-14 PROCEDURE — G8399 PT W/DXA RESULTS DOCUMENT: HCPCS | Performed by: NURSE PRACTITIONER

## 2024-02-14 PROCEDURE — G8427 DOCREV CUR MEDS BY ELIG CLIN: HCPCS | Performed by: NURSE PRACTITIONER

## 2024-02-14 RX ORDER — DOXYCYCLINE HYCLATE 100 MG
100 TABLET ORAL 2 TIMES DAILY
Qty: 14 TABLET | Refills: 0 | Status: SHIPPED | OUTPATIENT
Start: 2024-02-14 | End: 2024-02-21

## 2024-02-14 NOTE — PROGRESS NOTES
Daria KAUFMAN [facility] (:  1955) is a 68 y.o. female,here for evaluation of the following chief complaint(s):  Sinus Problem (Started last Tuesday )    /77   Pulse 68   Temp 97.5 °F (36.4 °C) (Temporal)   Resp 16   Ht 1.651 m (5' 5\")   Wt 83.5 kg (184 lb)   SpO2 99%   BMI 30.62 kg/m²       SUBJECTIVE/OBJECTIVE:    HPI:Patient reports cold symptoms started 8 days ago. She notes worsening sinus headache and thick purulent sinus congestion over the past 5 days. Covid test was negative. No fever. She felt better after the first 2 days, but then got worse again. She has taken tylenol and mucinex with little relief.     Review of Systems   HENT:  Positive for congestion and sinus pressure.        Physical Exam  Constitutional:       Appearance: Normal appearance.   HENT:      Head: Normocephalic.      Right Ear: Hearing, tympanic membrane, ear canal and external ear normal.      Left Ear: Hearing, tympanic membrane, ear canal and external ear normal.      Nose: Congestion present.      Right Turbinates: Swollen.      Left Turbinates: Swollen.      Right Sinus: Maxillary sinus tenderness present.      Left Sinus: Maxillary sinus tenderness present.      Mouth/Throat:      Lips: Pink.      Mouth: Mucous membranes are moist.      Pharynx: Oropharynx is clear.   Cardiovascular:      Rate and Rhythm: Normal rate and regular rhythm.   Pulmonary:      Effort: Pulmonary effort is normal.      Breath sounds: Normal breath sounds.   Musculoskeletal:         General: Normal range of motion.   Skin:     General: Skin is warm and dry.   Neurological:      General: No focal deficit present.      Mental Status: She is alert and oriented to person, place, and time.   Psychiatric:         Mood and Affect: Mood normal.         Behavior: Behavior normal.          ASSESSMENT/PLAN:  1. Acute non-recurrent maxillary sinusitis  Flonase and mucinex  May start antibiotic if worsening or no improvement over the next 3-5

## 2024-03-13 ENCOUNTER — TELEPHONE (OUTPATIENT)
Age: 69
End: 2024-03-13

## 2024-03-13 NOTE — TELEPHONE ENCOUNTER
Patient called stating that she will no longer need the 7/30,8/15 and 9/3 apptmts with Dr. Galeas. She is scheduled to see Dr. Roque who she has seen in the past.

## 2024-07-09 ENCOUNTER — APPOINTMENT (OUTPATIENT)
Facility: HOSPITAL | Age: 69
End: 2024-07-09
Payer: MEDICARE

## 2024-07-09 ENCOUNTER — HOSPITAL ENCOUNTER (EMERGENCY)
Facility: HOSPITAL | Age: 69
Discharge: HOME | End: 2024-07-09
Attending: STUDENT IN AN ORGANIZED HEALTH CARE EDUCATION/TRAINING PROGRAM
Payer: MEDICARE

## 2024-07-09 VITALS
OXYGEN SATURATION: 97 % | DIASTOLIC BLOOD PRESSURE: 71 MMHG | BODY MASS INDEX: 29.27 KG/M2 | HEIGHT: 66 IN | TEMPERATURE: 100.4 F | SYSTOLIC BLOOD PRESSURE: 119 MMHG | HEART RATE: 87 BPM | RESPIRATION RATE: 11 BRPM | WEIGHT: 182.1 LBS

## 2024-07-09 DIAGNOSIS — J06.9 VIRAL UPPER RESPIRATORY TRACT INFECTION: Primary | ICD-10-CM

## 2024-07-09 LAB
ALBUMIN SERPL-MCNC: 3.8 G/DL (ref 3.5–5)
ALBUMIN/GLOB SERPL: 1.1 (ref 1.1–2.2)
ALP SERPL-CCNC: 98 U/L (ref 45–117)
ALT SERPL-CCNC: 20 U/L (ref 12–78)
ANION GAP SERPL CALC-SCNC: 5 MMOL/L (ref 5–15)
AST SERPL-CCNC: 11 U/L (ref 15–37)
BASOPHILS # BLD: 0 K/UL (ref 0–0.1)
BASOPHILS NFR BLD: 0 % (ref 0–1)
BILIRUB SERPL-MCNC: 0.5 MG/DL (ref 0.2–1)
BUN SERPL-MCNC: 18 MG/DL (ref 6–20)
BUN/CREAT SERPL: 12 (ref 12–20)
CALCIUM SERPL-MCNC: 9.2 MG/DL (ref 8.5–10.1)
CHLORIDE SERPL-SCNC: 107 MMOL/L (ref 97–108)
CO2 SERPL-SCNC: 25 MMOL/L (ref 21–32)
COMMENT:: NORMAL
CREAT SERPL-MCNC: 1.47 MG/DL (ref 0.55–1.02)
DIFFERENTIAL METHOD BLD: ABNORMAL
EOSINOPHIL # BLD: 0.1 K/UL (ref 0–0.4)
EOSINOPHIL NFR BLD: 1 % (ref 0–7)
ERYTHROCYTE [DISTWIDTH] IN BLOOD BY AUTOMATED COUNT: 13.2 % (ref 11.5–14.5)
FLUAV RNA SPEC QL NAA+PROBE: NOT DETECTED
FLUBV RNA SPEC QL NAA+PROBE: NOT DETECTED
GLOBULIN SER CALC-MCNC: 3.4 G/DL (ref 2–4)
GLUCOSE SERPL-MCNC: 118 MG/DL (ref 65–100)
HCT VFR BLD AUTO: 38.3 % (ref 35–47)
HGB BLD-MCNC: 12.4 G/DL (ref 11.5–16)
IMM GRANULOCYTES # BLD AUTO: 0 K/UL (ref 0–0.04)
IMM GRANULOCYTES NFR BLD AUTO: 0 % (ref 0–0.5)
LYMPHOCYTES # BLD: 0.4 K/UL (ref 0.8–3.5)
LYMPHOCYTES NFR BLD: 5 % (ref 12–49)
MCH RBC QN AUTO: 27.8 PG (ref 26–34)
MCHC RBC AUTO-ENTMCNC: 32.4 G/DL (ref 30–36.5)
MCV RBC AUTO: 85.9 FL (ref 80–99)
MONOCYTES # BLD: 0.5 K/UL (ref 0–1)
MONOCYTES NFR BLD: 7 % (ref 5–13)
NEUTS SEG # BLD: 6 K/UL (ref 1.8–8)
NEUTS SEG NFR BLD: 87 % (ref 32–75)
NRBC # BLD: 0 K/UL (ref 0–0.01)
NRBC BLD-RTO: 0 PER 100 WBC
PLATELET # BLD AUTO: 153 K/UL (ref 150–400)
PMV BLD AUTO: 9.5 FL (ref 8.9–12.9)
POTASSIUM SERPL-SCNC: 4.4 MMOL/L (ref 3.5–5.1)
PROT SERPL-MCNC: 7.2 G/DL (ref 6.4–8.2)
RBC # BLD AUTO: 4.46 M/UL (ref 3.8–5.2)
RBC MORPH BLD: ABNORMAL
SARS-COV-2 RNA RESP QL NAA+PROBE: NOT DETECTED
SODIUM SERPL-SCNC: 137 MMOL/L (ref 136–145)
SPECIMEN HOLD: NORMAL
WBC # BLD AUTO: 7 K/UL (ref 3.6–11)

## 2024-07-09 PROCEDURE — 36415 COLL VENOUS BLD VENIPUNCTURE: CPT

## 2024-07-09 PROCEDURE — 87636 SARSCOV2 & INF A&B AMP PRB: CPT

## 2024-07-09 PROCEDURE — 70450 CT HEAD/BRAIN W/O DYE: CPT

## 2024-07-09 PROCEDURE — 85025 COMPLETE CBC W/AUTO DIFF WBC: CPT

## 2024-07-09 PROCEDURE — 80053 COMPREHEN METABOLIC PANEL: CPT

## 2024-07-09 PROCEDURE — 71046 X-RAY EXAM CHEST 2 VIEWS: CPT

## 2024-07-09 PROCEDURE — 99284 EMERGENCY DEPT VISIT MOD MDM: CPT

## 2024-07-09 ASSESSMENT — PAIN SCALES - GENERAL: PAINLEVEL_OUTOF10: 0

## 2024-07-09 ASSESSMENT — PAIN - FUNCTIONAL ASSESSMENT: PAIN_FUNCTIONAL_ASSESSMENT: NONE - DENIES PAIN

## 2024-07-09 NOTE — ED TRIAGE NOTES
Congestion, sore throat, joint pain, stumbling, dizziness that started a couple of days ago. Pt reports intermittent generalized weakness. Pt's  reports cognitive problems and gait issues over the last month or so.

## 2024-07-09 NOTE — ED PROVIDER NOTES
HPI Comments: 79 y.o. female with past medical history significant for gout, HTN, CKD, ARF, IHD, TIA, afib, angina, DJD, obesity, and high cholesterol who presents with granddaughter from [address] [place] [address] via EMS with chief complaint of hand pain. Pt complains of ongoing pain and swelling in her right wrist and right fingers. Pt states, \"I can't hardly move all of these fingers\". Pt states pain radiates through her right wrist and is exacerbated with movement. Per EMS, they were called over concerns of increasing lethargy and decreased appetite. Pt's granddaughter notes the pt is acting normally and expresses concern over possible dehydration due to the fact that the patient \"keeps her room too hot and doesn't drink enough water\". Pt is ambulatory with a rollator at baseline. Pt's granddaughter denies a history of CHF. Pt denies a history of the symptoms. Pt denies taking blood thinners. Pt denies fever, chills, abdominal pain, chest pain, back pain, numbness, tingling, SOB, nausea, vomiting, diarrhea, constipation, leg swelling, headache, blurry vision and urinary changes. There are no other acute medical concerns at this time. Social hx: lives at [address] [place] [address]; denies tobacco use; denies EtOH use; denies illicit drug use  PCP: Genie Willis MD    Note written by Vinnie Jacinto, as dictated by Amina Avelar. Vito Mancini MD 1:43 PM      The history is provided by the patient and a relative.         Past Medical History:   Diagnosis Date    Angina, class II ([address] 3/5/2013    ARF (acute renal failure) ([address] 12/19/2010    Atrial fibrillation ([address] 7/28/2014    Bleeding per rectum 11/6/2009    Coronary atherosclerosis of native coronary artery 5/7/2011    DJD (degenerative joint disease)     DNR (do not resuscitate) 11/29/2011    Gout 11/6/2009    High cholesterol 11/6/2009    Hypertension     IHD (ischemic heart disease) 11/6/2009    Kidney disease, chronic, stage III (moderate, EGFR 30-59 ml/min) 11/6/2009    Kidney disease, chronic, stage III (moderate, EGFR 30-59 ml/min) 11/6/2009    Obesity     TIA (transient ischemic attack) 1/14/2011       Past Surgical History:   Procedure Laterality Date    [facility]ide[facility]       [facility]    Hx cholecystectomy  11/94    [facility]  1960     [facility]    Colonoscopy  4/11/2005      Dr. Matthew De Leon Hx heent      Hx appendectomy           Family History:   Problem Relation Age of Onset    Stroke Other     Hypertension Other     Heart Disease Other        Social History     Social History    Marital status:      Spouse name: N/A    Number of children: N/A    Years of education: N/A     Occupational History    Not on file. Social History Main Topics    Smoking status: Never Smoker    Smokeless tobacco: Never Used    Alcohol use No    Drug use: No    Sexual activity: No     Other Topics Concern    Not on file     Social History Narrative         ALLERGIES: Review of patient's allergies indicates no known allergies. Review of Systems   Constitutional: Negative for chills and fever. HENT: Negative for ear pain and sore throat. Eyes: Negative for pain and visual disturbance. Respiratory: Negative for chest tightness. Cardiovascular: Negative for leg swelling. Gastrointestinal: Negative for abdominal pain, constipation and diarrhea. Genitourinary: Negative for difficulty urinating, dysuria, flank pain and hematuria. Musculoskeletal:        Right hand pain   Skin: Negative for rash. All other systems reviewed and are negative. Vitals:    01/10/17 1331   BP: 126/67   Pulse: 72   Resp: 16   Temp: 98.2 °F (36.8 °C)   SpO2: 95%   Weight: 65.3 kg (144 lb)   Height: 5' 4\" (1.626 m)            Physical Exam   Constitutional: She appears well-developed and well-nourished. No distress. HENT:   Head: Normocephalic and atraumatic. Eyes: EOM are normal. Pupils are equal, round, and reactive to light.  No scleral icterus. Neck: Neck supple. No tracheal deviation present. Cardiovascular: Normal rate, regular rhythm and normal heart sounds. Pulmonary/Chest: Effort normal and breath sounds normal. No respiratory distress. Abdominal: Soft. She exhibits no distension. There is no tenderness. There is no rebound and no guarding. Genitourinary:   Genitourinary Comments: deferred   Musculoskeletal: She exhibits no edema. Right wrist: She exhibits tenderness and swelling. Right hand: She exhibits tenderness. Tender over right hand with swelling over right wrist and DIP and PIP joints   Neurological: She is alert. Skin: Skin is warm and dry. Psychiatric: She has a normal mood and affect. Nursing note and vitals reviewed. Note written by Vinnie Horn, as dictated by Milli Johnson MD 1:43 PM       [facility]  ED Course   The patient presents with fatigue reported by staff and right hand pain reported by patient with a differential diagnosis of anemia, ACS, UTI, gout, arthritis      Procedures  EKG Per My Interpretation:  Atrial fib at 61, nl intervals, normal axis, q waves in the inferior leads, non-specific T wave abnormalities in the lateral leads, no significant change from 8/21/16    LABORATORY TESTS:  Recent Results (from the past 12 hour(s))   EKG, 12 LEAD, INITIAL    Collection Time: 01/10/17  1:37 PM   Result Value Ref Range    Ventricular Rate 61 BPM    Atrial Rate 60 BPM    QRS Duration 76 ms    Q-T Interval 428 ms    QTC Calculation (Bezet) 430 ms    Calculated R Axis -5 degrees    Calculated T Axis 125 degrees    Diagnosis       Atrial fibrillation  Inferior infarct (cited on or before 21-AUG-2016)  When compared with ECG of 21-AUG-2016 14:17,  Nonspecific T wave abnormality, worse in Inferior leads  Nonspecific T wave abnormality, improved in Anterior leads     CBC WITH AUTOMATED DIFF    Collection Time: 01/10/17  1:42 PM   Result Value Ref Range    WBC 3.2 (L) 3.6 - 11.0 K/uL    RBC 3.44 (L) 3.80 - 5.20 M/uL    HGB 9.0 (L) 11.5 - 16.0 g/dL    HCT 27.6 (L) 35.0 - 47.0 %    MCV 80.2 80.0 - 99.0 FL    MCH 26.2 26.0 - 34.0 PG    MCHC 32.6 30.0 - 36.5 g/dL    RDW 14.6 (H) 11.5 - 14.5 %    PLATELET 513 666 - 302 K/uL    NEUTROPHILS 44 32 - 75 %    LYMPHOCYTES 45 12 - 49 %    MONOCYTES 10 5 - 13 %    EOSINOPHILS 1 0 - 7 %    BASOPHILS 0 0 - 1 %    ABS. NEUTROPHILS 1.4 (L) 1.8 - 8.0 K/UL    ABS. LYMPHOCYTES 1.5 0.8 - 3.5 K/UL    ABS. MONOCYTES 0.3 0.0 - 1.0 K/UL    ABS. EOSINOPHILS 0.0 0.0 - 0.4 K/UL    ABS. BASOPHILS 0.0 0.0 - 0.1 K/UL   METABOLIC PANEL, COMPREHENSIVE    Collection Time: 01/10/17  1:42 PM   Result Value Ref Range    Sodium 141 136 - 145 mmol/L    Potassium 4.1 3.5 - 5.1 mmol/L    Chloride 106 97 - 108 mmol/L    CO2 25 21 - 32 mmol/L    Anion gap 10 5 - 15 mmol/L    Glucose 110 (H) 65 - 100 mg/dL    BUN 51 (H) 6 - 20 MG/DL    Creatinine 2.66 (H) 0.55 - 1.02 MG/DL    BUN/Creatinine ratio 19 12 - 20      GFR est AA 20 (L) >60 ml/min/1.73m2    GFR est non-AA 16 (L) >60 ml/min/1.73m2    Calcium 8.2 (L) 8.5 - 10.1 MG/DL    Bilirubin, total 0.5 0.2 - 1.0 MG/DL    ALT 6 (L) 12 - 78 U/L    AST 8 (L) 15 - 37 U/L    Alk. phosphatase 99 45 - 117 U/L    Protein, total 8.7 (H) 6.4 - 8.2 g/dL    Albumin 2.2 (L) 3.5 - 5.0 g/dL    Globulin 6.5 (H) 2.0 - 4.0 g/dL    A-G Ratio 0.3 (L) 1.1 - 2.2     MAGNESIUM    Collection Time: 01/10/17  1:42 PM   Result Value Ref Range    Magnesium 2.0 1.6 - 2.4 mg/dL       IMAGING RESULTS:  Xr Hand Rt Min 3 V    Result Date: 1/10/2017  EXAM:  XR HAND RT MIN 3 V INDICATION:  Generalized weakness and loss of strength in right hand. COMPARISON: None. FINDINGS: Three views of the right hand demonstrate no acute fracture or dislocation. There is diffuse joint space narrowing with osteophytes and periarticular erosions. There is diffuse osteopenia. The soft tissues are within normal limits. IMPRESSION:  No acute abnormality.  Diffuse degenerative changes in intracranial mass or hemorrhage.      CLINICAL HISTORY: Cough,   INDICATION:   Cough   COMPARISON: 2016      FINDINGS:   PA and lateral views of the chest are obtained.   The cardiopericardial silhouette is within normal limits. There is no pleural   effusion, pneumothorax or focal consolidation present.      IMPRESSION:   No acute intrathoracic process.         Electronically signed by [employer]      XR CHEST (2 VW)   Final Result   No acute intracranial process.   There is no intracranial mass or hemorrhage.      CLINICAL HISTORY: Cough,   INDICATION:   Cough   COMPARISON: 2016      FINDINGS:   PA and lateral views of the chest are obtained.   The cardiopericardial silhouette is within normal limits. There is no pleural   effusion, pneumothorax or focal consolidation present.      IMPRESSION:   No acute intrathoracic process.         Electronically signed by [employer]           LABS:  Labs Reviewed   COVID-19 & INFLUENZA COMBO   CBC WITH AUTO DIFFERENTIAL   COMPREHENSIVE METABOLIC PANEL       All other labs were within normal range or not returned as of this dictation.    EMERGENCY DEPARTMENT COURSE and DIFFERENTIAL DIAGNOSIS/MDM:   Vitals:    Vitals:    07/09/24 1511   BP: 116/61   Pulse: (!) 104   Resp: 22   Temp: 100.4 °F (38 °C)   TempSrc: Oral   SpO2: 95%   Weight: 82.6 kg (182 lb 1.6 oz)   Height: 1.676 m (5' 6\")           Medical Decision Making  The patient is a 68-year-old female presents emergency department with concern for forgetfulness, altered gait and dizziness, all of which are chronic, but appear worse over the last couple of weeks.  Patient was recently seen in urgent care, she was diagnosed with some infection, and placed on an antibiotic, but the medication, and the diagnosis are in question, as the patient does not have the encounter documents with her in the department.    Differential diagnosis: Sinusitis, upper respiratory infection, cerebrovascular accident, COVID    The patient's  keeping with erosive osteoarthritis. MEDICATIONS GIVEN:  Medications - No data to display    IMPRESSION:  1. Acute cystitis without hematuria        PLAN:  1. Keflex  2. Follow up with PCP  3. Return to ED if worsening symptoms or new concerns    Discharge Note  2:28 PM  The patient is ready for discharge. The patient's signs, symptoms, diagnosis, and discharge instructions have been discussed and the patient has conveyed their understanding. The patient is to follow up as recommended or return to the ER should their symptoms worsen. Plan has been discussed and the patient is in agreement. Jerrod Arredondo MD    CONSULT NOTE:  5:19 PM Jovon Harkins. Uziel Moreno MD spoke with Dr. Bailey Patiño, Consult for PCP. Discussed available diagnostic tests and clinical findings. He is in agreement with care plans as outlined.

## 2024-07-09 NOTE — ED NOTES
3:06 PM    Congestion, spouse says more confused than usual and gait is off, shuffling and slower than usual.    Unsteady gait and confusion longer lasting and congestion started yesterday    Several weeks ago was dizzy and thought it was vertigo, but seems it resolved      I have evaluated the patient as the Provider in Rapid Medical Evaluation (RME). I have reviewed her vital signs and the triage nurse assessment. I have talked with the patient and any available family and advised that I am the provider in triage and have ordered the appropriate study to initiate their work up based on the clinical presentation during my assessment. I have advised that the patient will be accommodated in the Main ED as soon as possible. I have also requested to contact the triage nurse or myself immediately if the patient experiences any changes in their condition during this brief waiting period.  Katrin Lay PA-C

## 2024-07-09 NOTE — DISCHARGE INSTRUCTIONS
Take the antibiotic prescribed by Urgent Care.  Ensure you have extra rest and fluids  You may take mucinex for the congestion purchase 600 mg tabs over the counter and take 1-2 daily  Consider flonase, use 1 spray each nostril, it will work more efficiently if used daily until well  Follow up with your primary doctor

## 2024-10-04 DIAGNOSIS — E78.00 PURE HYPERCHOLESTEROLEMIA, UNSPECIFIED: ICD-10-CM

## 2024-10-04 DIAGNOSIS — F41.8 OTHER SPECIFIED ANXIETY DISORDERS: ICD-10-CM

## 2024-10-04 DIAGNOSIS — F41.1 GENERALIZED ANXIETY DISORDER: ICD-10-CM

## 2024-10-04 RX ORDER — ROSUVASTATIN CALCIUM 10 MG/1
10 TABLET, COATED ORAL NIGHTLY
Qty: 90 TABLET | Refills: 3 | Status: SHIPPED | OUTPATIENT
Start: 2024-10-04

## 2024-10-04 RX ORDER — BUPROPION HYDROCHLORIDE 300 MG/1
300 TABLET ORAL EVERY MORNING
Qty: 90 TABLET | Refills: 3 | Status: SHIPPED | OUTPATIENT
Start: 2024-10-04

## 2024-10-25 ENCOUNTER — OFFICE VISIT (OUTPATIENT)
Age: 69
End: 2024-10-25
Payer: MEDICARE

## 2024-10-25 ENCOUNTER — HOSPITAL ENCOUNTER (OUTPATIENT)
Age: 69
Discharge: HOME | End: 2024-10-28

## 2024-10-25 VITALS
RESPIRATION RATE: 16 BRPM | HEART RATE: 73 BPM | BODY MASS INDEX: 28.87 KG/M2 | SYSTOLIC BLOOD PRESSURE: 147 MMHG | TEMPERATURE: 98.7 F | DIASTOLIC BLOOD PRESSURE: 82 MMHG | WEIGHT: 179.6 LBS | HEIGHT: 66 IN | OXYGEN SATURATION: 97 %

## 2024-10-25 DIAGNOSIS — N63.13 MASS OF LOWER OUTER QUADRANT OF RIGHT BREAST: Primary | ICD-10-CM

## 2024-10-25 DIAGNOSIS — Z12.31 SCREENING MAMMOGRAM, ENCOUNTER FOR: ICD-10-CM

## 2024-10-25 PROCEDURE — 99213 OFFICE O/P EST LOW 20 MIN: CPT

## 2024-10-25 SDOH — ECONOMIC STABILITY: FOOD INSECURITY: WITHIN THE PAST 12 MONTHS, YOU WORRIED THAT YOUR FOOD WOULD RUN OUT BEFORE YOU GOT MONEY TO BUY MORE.: NEVER TRUE

## 2024-10-25 SDOH — ECONOMIC STABILITY: FOOD INSECURITY: WITHIN THE PAST 12 MONTHS, THE FOOD YOU BOUGHT JUST DIDN'T LAST AND YOU DIDN'T HAVE MONEY TO GET MORE.: NEVER TRUE

## 2024-10-25 SDOH — ECONOMIC STABILITY: INCOME INSECURITY: HOW HARD IS IT FOR YOU TO PAY FOR THE VERY BASICS LIKE FOOD, HOUSING, MEDICAL CARE, AND HEATING?: NOT HARD AT ALL

## 2024-10-25 ASSESSMENT — ENCOUNTER SYMPTOMS
SINUS PAIN: 0
DIARRHEA: 0
VOMITING: 0
COUGH: 0
SORE THROAT: 0
WHEEZING: 0
CHEST TIGHTNESS: 0
BACK PAIN: 0
ROS SKIN COMMENTS: BREAST LUMP
CONSTIPATION: 0
NAUSEA: 0
ABDOMINAL PAIN: 0
SHORTNESS OF BREATH: 0

## 2024-10-25 NOTE — PROGRESS NOTES
Daria Baez is a 68 y.o. female who was seen in clinic today (10/25/2024) for an acute visit.      Assessment & Plan:   Below is the assessment and plan developed based on review of pertinent history, physical exam, labs, studies, and medications.    Assessment & Plan  Mass of lower outer quadrant of right breast    Likely benign finding consistent with prior fibrocystic changes reported by patient given lack of B symptoms, pain, discharge, nor lymphadenopathy. Ordered diagnostic mammo with US.    Orders:    [facility] DIGITAL DIAGNOSTIC BILATERAL; Future    US BREAST COMPLETE RIGHT; Future      Subjective/Objective:   Daria was seen today for Breast Problem    Daria is a 68 year-old woman with medicla history of MDD, osteopenia, ANDRIA, HLD, preDM, and subclinical hypothyroidism who presents for evaluation of a breast lump. Last saw her PCP, Dr. Steinberg 09/2023, has upcoming annual visit 11/8/24.    She notes having a history of \"lumpy\" breasts and fibrocystic changes. She endorses that 2 days ago, she felt 2 lumps in her right breast that felt firmer than her typical nodules while taking a shower. Denies B symptoms, reports 3lb weight loss over the last year. Her mother had a unspecified breast lump. Lumps are non-painful, denies skin changes, discharge, nor lymphadenopathy other than a chronically enlarged lymph node in her right underarm.    She presented for annual screening mammo today, but was directed to see her PCP office for evaluation prior to ordering of diagnostic mammo after she reported new lumps while at Radiology.    Prior to Admission medications    Medication Sig Start Date End Date Taking? Authorizing Provider   rosuvastatin (CRESTOR) 10 MG tablet TAKE 1 TABLET NIGHTLY 10/4/24  Yes Curt Steinberg MD   buPROPion (WELLBUTRIN XL) 300 MG extended release tablet TAKE 1 TABLET EVERY MORNING 10/4/24  Yes Curt Steinberg MD   Acetaminophen (TYLENOL 8 HOUR PO) Take by mouth   Yes Provider,

## 2024-11-01 DIAGNOSIS — E78.00 HYPERCHOLESTEREMIA: Primary | ICD-10-CM

## 2024-11-01 DIAGNOSIS — E03.8 SUBCLINICAL HYPOTHYROIDISM: ICD-10-CM

## 2024-11-01 DIAGNOSIS — R73.03 PREDIABETES: ICD-10-CM

## 2024-11-05 SDOH — HEALTH STABILITY: PHYSICAL HEALTH: ON AVERAGE, HOW MANY MINUTES DO YOU ENGAGE IN EXERCISE AT THIS LEVEL?: 40 MIN

## 2024-11-05 SDOH — HEALTH STABILITY: PHYSICAL HEALTH: ON AVERAGE, HOW MANY DAYS PER WEEK DO YOU ENGAGE IN MODERATE TO STRENUOUS EXERCISE (LIKE A BRISK WALK)?: 2 DAYS

## 2024-11-05 ASSESSMENT — PATIENT HEALTH QUESTIONNAIRE - PHQ9
SUM OF ALL RESPONSES TO PHQ QUESTIONS 1-9: 2
SUM OF ALL RESPONSES TO PHQ QUESTIONS 1-9: 2
1. LITTLE INTEREST OR PLEASURE IN DOING THINGS: SEVERAL DAYS
SUM OF ALL RESPONSES TO PHQ QUESTIONS 1-9: 2
SUM OF ALL RESPONSES TO PHQ QUESTIONS 1-9: 2
2. FEELING DOWN, DEPRESSED OR HOPELESS: SEVERAL DAYS
SUM OF ALL RESPONSES TO PHQ9 QUESTIONS 1 & 2: 2

## 2024-11-05 ASSESSMENT — LIFESTYLE VARIABLES
HOW OFTEN DO YOU HAVE A DRINK CONTAINING ALCOHOL: 1
HOW MANY STANDARD DRINKS CONTAINING ALCOHOL DO YOU HAVE ON A TYPICAL DAY: PATIENT DOES NOT DRINK
HOW OFTEN DO YOU HAVE SIX OR MORE DRINKS ON ONE OCCASION: 1
HOW MANY STANDARD DRINKS CONTAINING ALCOHOL DO YOU HAVE ON A TYPICAL DAY: 0
HOW OFTEN DO YOU HAVE A DRINK CONTAINING ALCOHOL: NEVER

## 2024-11-06 DIAGNOSIS — E03.8 SUBCLINICAL HYPOTHYROIDISM: ICD-10-CM

## 2024-11-06 DIAGNOSIS — R73.03 PREDIABETES: ICD-10-CM

## 2024-11-06 DIAGNOSIS — E78.00 HYPERCHOLESTEREMIA: ICD-10-CM

## 2024-11-06 LAB
ALBUMIN SERPL-MCNC: 3.6 G/DL (ref 3.5–5)
ALBUMIN/GLOB SERPL: 1.3 (ref 1.1–2.2)
ALP SERPL-CCNC: 103 U/L (ref 45–117)
ALT SERPL-CCNC: 21 U/L (ref 12–78)
ANION GAP SERPL CALC-SCNC: 2 MMOL/L (ref 2–12)
AST SERPL-CCNC: 13 U/L (ref 15–37)
BILIRUB SERPL-MCNC: 0.3 MG/DL (ref 0.2–1)
BUN SERPL-MCNC: 22 MG/DL (ref 6–20)
BUN/CREAT SERPL: 16 (ref 12–20)
CALCIUM SERPL-MCNC: 9 MG/DL (ref 8.5–10.1)
CHLORIDE SERPL-SCNC: 110 MMOL/L (ref 97–108)
CHOLEST SERPL-MCNC: 170 MG/DL
CO2 SERPL-SCNC: 29 MMOL/L (ref 21–32)
CREAT SERPL-MCNC: 1.4 MG/DL (ref 0.55–1.02)
ERYTHROCYTE [DISTWIDTH] IN BLOOD BY AUTOMATED COUNT: 13.1 % (ref 11.5–14.5)
EST. AVERAGE GLUCOSE BLD GHB EST-MCNC: 120 MG/DL
GLOBULIN SER CALC-MCNC: 2.8 G/DL (ref 2–4)
GLUCOSE SERPL-MCNC: 104 MG/DL (ref 65–100)
HBA1C MFR BLD: 5.8 % (ref 4–5.6)
HCT VFR BLD AUTO: 39 % (ref 35–47)
HDLC SERPL-MCNC: 70 MG/DL
HDLC SERPL: 2.4 (ref 0–5)
HGB BLD-MCNC: 12.6 G/DL (ref 11.5–16)
LDLC SERPL CALC-MCNC: 77.4 MG/DL (ref 0–100)
MCH RBC QN AUTO: 28.1 PG (ref 26–34)
MCHC RBC AUTO-ENTMCNC: 32.3 G/DL (ref 30–36.5)
MCV RBC AUTO: 86.9 FL (ref 80–99)
NRBC # BLD: 0 K/UL (ref 0–0.01)
NRBC BLD-RTO: 0 PER 100 WBC
PLATELET # BLD AUTO: 170 K/UL (ref 150–400)
PMV BLD AUTO: 9.7 FL (ref 8.9–12.9)
POTASSIUM SERPL-SCNC: 4.7 MMOL/L (ref 3.5–5.1)
PROT SERPL-MCNC: 6.4 G/DL (ref 6.4–8.2)
RBC # BLD AUTO: 4.49 M/UL (ref 3.8–5.2)
SODIUM SERPL-SCNC: 141 MMOL/L (ref 136–145)
TRIGL SERPL-MCNC: 113 MG/DL
TSH SERPL DL<=0.05 MIU/L-ACNC: 4.77 UIU/ML (ref 0.36–3.74)
VLDLC SERPL CALC-MCNC: 22.6 MG/DL
WBC # BLD AUTO: 5.1 K/UL (ref 3.6–11)

## 2024-11-06 NOTE — RESULT ENCOUNTER NOTE
Results released to patient via [employer].  She has f/u on 11/8 to review. All labs are stable or at goal for her.  TSH elevated, stable.  A1c and fasting glucose improved.  Renal fxn at baseline.

## 2024-11-08 ENCOUNTER — OFFICE VISIT (OUTPATIENT)
Age: 69
End: 2024-11-08
Payer: MEDICARE

## 2024-11-08 VITALS
OXYGEN SATURATION: 97 % | RESPIRATION RATE: 16 BRPM | HEIGHT: 66 IN | SYSTOLIC BLOOD PRESSURE: 138 MMHG | DIASTOLIC BLOOD PRESSURE: 87 MMHG | TEMPERATURE: 97 F | WEIGHT: 182 LBS | HEART RATE: 83 BPM | BODY MASS INDEX: 29.25 KG/M2

## 2024-11-08 DIAGNOSIS — R73.03 PREDIABETES: ICD-10-CM

## 2024-11-08 DIAGNOSIS — E03.8 SUBCLINICAL HYPOTHYROIDISM: ICD-10-CM

## 2024-11-08 DIAGNOSIS — N18.31 STAGE 3A CHRONIC KIDNEY DISEASE (HCC): ICD-10-CM

## 2024-11-08 DIAGNOSIS — Z71.89 ADVANCED CARE PLANNING/COUNSELING DISCUSSION: ICD-10-CM

## 2024-11-08 DIAGNOSIS — Z00.00 MEDICARE ANNUAL WELLNESS VISIT, SUBSEQUENT: Primary | ICD-10-CM

## 2024-11-08 DIAGNOSIS — Z91.81 RISK FOR FALLS: ICD-10-CM

## 2024-11-08 DIAGNOSIS — F33.42 RECURRENT MAJOR DEPRESSIVE DISORDER, IN FULL REMISSION (HCC): ICD-10-CM

## 2024-11-08 DIAGNOSIS — M85.89 OSTEOPENIA OF MULTIPLE SITES: ICD-10-CM

## 2024-11-08 DIAGNOSIS — F41.1 GAD (GENERALIZED ANXIETY DISORDER): ICD-10-CM

## 2024-11-08 DIAGNOSIS — E78.00 HYPERCHOLESTEREMIA: ICD-10-CM

## 2024-11-08 PROCEDURE — 99214 OFFICE O/P EST MOD 30 MIN: CPT | Performed by: INTERNAL MEDICINE

## 2024-11-08 RX ORDER — ESTRADIOL 0.1 MG/G
2 CREAM VAGINAL
COMMUNITY
Start: 2024-06-13

## 2024-11-08 SDOH — ECONOMIC STABILITY: FOOD INSECURITY: WITHIN THE PAST 12 MONTHS, YOU WORRIED THAT YOUR FOOD WOULD RUN OUT BEFORE YOU GOT MONEY TO BUY MORE.: NEVER TRUE

## 2024-11-08 SDOH — ECONOMIC STABILITY: INCOME INSECURITY: HOW HARD IS IT FOR YOU TO PAY FOR THE VERY BASICS LIKE FOOD, HOUSING, MEDICAL CARE, AND HEATING?: NOT HARD AT ALL

## 2024-11-08 SDOH — ECONOMIC STABILITY: FOOD INSECURITY: WITHIN THE PAST 12 MONTHS, THE FOOD YOU BOUGHT JUST DIDN'T LAST AND YOU DIDN'T HAVE MONEY TO GET MORE.: NEVER TRUE

## 2024-11-08 ASSESSMENT — ANXIETY QUESTIONNAIRES
4. TROUBLE RELAXING: MORE THAN HALF THE DAYS
3. WORRYING TOO MUCH ABOUT DIFFERENT THINGS: SEVERAL DAYS
1. FEELING NERVOUS, ANXIOUS, OR ON EDGE: MORE THAN HALF THE DAYS
2. NOT BEING ABLE TO STOP OR CONTROL WORRYING: SEVERAL DAYS
IF YOU CHECKED OFF ANY PROBLEMS ON THIS QUESTIONNAIRE, HOW DIFFICULT HAVE THESE PROBLEMS MADE IT FOR YOU TO DO YOUR WORK, TAKE CARE OF THINGS AT HOME, OR GET ALONG WITH OTHER PEOPLE: SOMEWHAT DIFFICULT
GAD7 TOTAL SCORE: 7
5. BEING SO RESTLESS THAT IT IS HARD TO SIT STILL: NOT AT ALL
6. BECOMING EASILY ANNOYED OR IRRITABLE: NOT AT ALL
7. FEELING AFRAID AS IF SOMETHING AWFUL MIGHT HAPPEN: SEVERAL DAYS

## 2024-11-08 ASSESSMENT — ENCOUNTER SYMPTOMS
COUGH: 0
VOMITING: 0
ABDOMINAL PAIN: 0
DIARRHEA: 0
SHORTNESS OF BREATH: 0
NAUSEA: 0
CONSTIPATION: 1
BLOOD IN STOOL: 0

## 2024-11-08 NOTE — ACP (ADVANCE CARE PLANNING)
Advance Care Planning   Advance Care Planning (ACP) Physician/NP/PA (Provider) Conversation    Date of ACP Conversation: 11/08/24  Persons included in Conversation:  patient  Length of ACP Conversation in minutes: <16 minutes (Non-Billable)    We discussed the patient’s choices for care and treatment preferences in case of a health event that adversely affects decision-making abilities or is life-limiting. We discusses the differences between FULL CODE and DNR and when to consider a change in code status.  The limitations with CPR were reviewed.    Has ACP document(s) on file - reflects the patient's care preferences.  She elects Full Code (Attempt Resuscitation)    The patient has appointed the following active healthcare agents:    Primary Decision Maker: Quinton Baez - Spouse - [phone]    Secondary Decision Maker: Sean Baez - Child - [phone]    Secondary Decision Maker: Yandel Baez - Child - [phone]     Curt Steinberg MD

## 2024-11-08 NOTE — ASSESSMENT & PLAN NOTE
Chronic, at goal (stable), continue current treatment plan.  Continue to stay hydrated and avoid NSAID's

## 2024-11-08 NOTE — PROGRESS NOTES
MORNING Yes Curt Steinberg MD   Acetaminophen (TYLENOL 8 HOUR PO) Take by mouth Yes Provider, MD Jair   sertraline (ZOLOFT) 100 MG tablet Take 1.5 tablets by mouth daily Yes Curt Steinberg MD   vitamin B-12 (CYANOCOBALAMIN) 1000 MCG tablet Take 1 tablet by mouth daily Yes Curt Steinberg MD   vitamin D (CHOLECALCIFEROL) 25 MCG (1000 UT) TABS tablet Take 2 tablets by mouth daily Yes Curt Steinberg MD       CareTeam (Including outside providers/suppliers regularly involved in providing care):   Patient Care Team:  Curt Steinberg MD as PCP - General  Curt Steinberg MD as PCP - Empaneled Provider  Mary Roberts MD (Obstetrics & Gynecology)  Westley Kelley MD (Gastroenterology)      Reviewed and updated this visit:  Tobacco  Allergies  Meds  Problems  Med Hx  Surg Hx  Soc Hx  Fam Hx

## 2024-11-08 NOTE — ASSESSMENT & PLAN NOTE
Not ideally controlled, it is fluctuating, but overall stable.  Last year depression was worse, this year it sounds like anxiety is worse. I reviewed treatment options with her, I reviewed life style changes to help improve mood, continue current treatment.  We discussed medication changes but declined.

## 2024-11-08 NOTE — ASSESSMENT & PLAN NOTE
Asymptomatic, overdue for imaging, is of treatment, we will revisit medications after reviewing DEXA.

## 2024-11-08 NOTE — ASSESSMENT & PLAN NOTE
Stable from last year and well controlled.  Last year depression was worse, this year it sounds like anxiety is worse. I reviewed treatment options with her, I reviewed life style changes to help improve mood, continue current treatment.

## 2024-11-27 ENCOUNTER — HOSPITAL ENCOUNTER (OUTPATIENT)
Facility: HOSPITAL | Age: 69
Discharge: HOME | End: 2024-11-30
Payer: MEDICARE

## 2024-11-27 VITALS — WEIGHT: 182 LBS | HEIGHT: 66 IN | BODY MASS INDEX: 29.25 KG/M2

## 2024-11-27 DIAGNOSIS — N63.13 MASS OF LOWER OUTER QUADRANT OF RIGHT BREAST: ICD-10-CM

## 2024-11-27 PROCEDURE — G0279 TOMOSYNTHESIS, MAMMO: HCPCS

## 2024-11-27 PROCEDURE — 76642 ULTRASOUND BREAST LIMITED: CPT

## 2024-12-09 DIAGNOSIS — F33.41 MAJOR DEPRESSIVE DISORDER, RECURRENT, IN PARTIAL REMISSION (HCC): ICD-10-CM

## 2024-12-09 DIAGNOSIS — F41.1 GENERALIZED ANXIETY DISORDER: ICD-10-CM

## 2024-12-09 RX ORDER — SERTRALINE HYDROCHLORIDE 100 MG/1
TABLET, FILM COATED ORAL
Qty: 135 TABLET | Refills: 3 | Status: SHIPPED | OUTPATIENT
Start: 2024-12-09

## 2024-12-09 NOTE — TELEPHONE ENCOUNTER
Future Appointments   Date Time Provider Department Center   12/10/2024  9:30 AM [facility]    12/13/2024  8:00 AM [facility]   11/11/2025  8:40 AM Curt Steinberg MD [facility] DE[facility]       Consent (Lip)/Introductory Paragraph: The rationale for Mohs was explained to the patient and consent was obtained. The risks, benefits and alternatives to therapy were discussed in detail. Specifically, the risks of lip deformity, changes in the oral aperture, infection, scarring, bleeding, prolonged wound healing, incomplete removal, allergy to anesthesia, nerve injury and recurrence were addressed. Prior to the procedure, the treatment site was clearly identified and confirmed by the patient. All components of Universal Protocol/PAUSE Rule completed.

## 2024-12-10 ENCOUNTER — HOSPITAL ENCOUNTER (OUTPATIENT)
Facility: HOSPITAL | Age: 69
Setting detail: RECURRING SERIES
Discharge: HOME | End: 2024-12-13
Payer: MEDICARE

## 2024-12-10 PROCEDURE — 97161 PT EVAL LOW COMPLEX 20 MIN: CPT

## 2024-12-10 PROCEDURE — 97110 THERAPEUTIC EXERCISES: CPT
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
## 2024-12-13 ENCOUNTER — HOSPITAL ENCOUNTER (OUTPATIENT)
Facility: HOSPITAL | Age: 69
Discharge: HOME | End: 2024-12-16
Attending: INTERNAL MEDICINE
Payer: MEDICARE

## 2024-12-13 VITALS — HEIGHT: 66 IN | WEIGHT: 180 LBS | BODY MASS INDEX: 28.93 KG/M2

## 2024-12-13 DIAGNOSIS — M85.89 OSTEOPENIA OF MULTIPLE SITES: ICD-10-CM

## 2024-12-13 PROCEDURE — 77080 DXA BONE DENSITY AXIAL: CPT

## 2024-12-17 ENCOUNTER — HOSPITAL ENCOUNTER (OUTPATIENT)
Facility: HOSPITAL | Age: 69
Setting detail: RECURRING SERIES
Discharge: HOME | End: 2024-12-20
Payer: MEDICARE

## 2024-12-17 PROCEDURE — 97110 THERAPEUTIC EXERCISES: CPT

## 2024-12-17 PROCEDURE — 97112 NEUROMUSCULAR REEDUCATION: CPT

## 2024-12-17 NOTE — PROGRESS NOTES
Details if applicable:           Details if applicable:            Details if applicable:     35 30    Total Total       [x]  Patient Education billed concurrently with other procedures   [x] Review HEP    [] Progressed/Changed HEP, detail:    [] Other detail:         Other Objective/Functional Measures      Pain Level at end of session (0-10 scale): 0      Assessment   Good tolerance to progressions without LOB. Cued for upright posture throughout. Reviewed posture while working on computer and given handout for home.   Patient will continue to benefit from skilled PT / OT services to modify and progress therapeutic interventions, analyze and address functional mobility deficits, analyze and address ROM deficits, analyze and address strength deficits, analyze and modify for postural abnormalities, and analyze and address imbalance/dizziness to address functional deficits and attain remaining goals.    Progress toward goals / Updated goals:  []  See Progress Note/Recertification    Short Term Goals: To be accomplished in 5 treatments  -Independent in HEP as evidenced on ability to perform at least 5 exercises from HEP using proper form without verbal cuing.   -Demostrate proper posture in order to decrease back and knee  pain and improve gait pattern  -Pt will report she is going to the gym twice weekly for overall mobility     Long Term Goals: To be accomplished in 12 weeks  -gait pattern normalized to reduce fall risk  -MMT 5/5 all planes to allow patient to perform ADL's  -SLS at least 15 sec blake to reduce risk for falls  -Pt will report balance confidence has improved by at least 50% to allow pt to do more things in community.  -Ability to climb stairs without UE for 1-3 steps.      PLAN  Yes  Continue plan of care  Re-Cert Due: after 12 weeks  []  Upgrade activities as tolerated  []  Discharge due to:  []  Other:      Mady Wilks, PTA       12/17/2024       9:07 AM

## 2024-12-20 ENCOUNTER — HOSPITAL ENCOUNTER (OUTPATIENT)
Facility: HOSPITAL | Age: 69
Setting detail: RECURRING SERIES
End: 2024-12-20
Payer: MEDICARE

## 2024-12-24 ENCOUNTER — APPOINTMENT (OUTPATIENT)
Facility: HOSPITAL | Age: 69
End: 2024-12-24
Payer: MEDICARE

## 2024-12-30 ENCOUNTER — APPOINTMENT (OUTPATIENT)
Facility: HOSPITAL | Age: 69
End: 2024-12-30
Payer: MEDICARE

## 2025-01-02 RX ORDER — CHOLECALCIFEROL (VITAMIN D3) 25 MCG
2 TABLET ORAL DAILY
Qty: 180 TABLET | Refills: 3 | Status: SHIPPED | OUTPATIENT
Start: 2025-01-02

## 2025-01-02 RX ORDER — LANOLIN ALCOHOL/MO/W.PET/CERES
1000 CREAM (GRAM) TOPICAL DAILY
Qty: 90 TABLET | Refills: 3 | Status: SHIPPED | OUTPATIENT
Start: 2025-01-02

## 2025-03-09 SDOH — ECONOMIC STABILITY: INCOME INSECURITY: IN THE LAST 12 MONTHS, WAS THERE A TIME WHEN YOU WERE NOT ABLE TO PAY THE MORTGAGE OR RENT ON TIME?: NO

## 2025-03-09 SDOH — ECONOMIC STABILITY: FOOD INSECURITY: WITHIN THE PAST 12 MONTHS, YOU WORRIED THAT YOUR FOOD WOULD RUN OUT BEFORE YOU GOT MONEY TO BUY MORE.: NEVER TRUE

## 2025-03-09 SDOH — ECONOMIC STABILITY: TRANSPORTATION INSECURITY
IN THE PAST 12 MONTHS, HAS THE LACK OF TRANSPORTATION KEPT YOU FROM MEDICAL APPOINTMENTS OR FROM GETTING MEDICATIONS?: NO

## 2025-03-09 SDOH — ECONOMIC STABILITY: FOOD INSECURITY: WITHIN THE PAST 12 MONTHS, THE FOOD YOU BOUGHT JUST DIDN'T LAST AND YOU DIDN'T HAVE MONEY TO GET MORE.: NEVER TRUE

## 2025-03-11 ENCOUNTER — OFFICE VISIT (OUTPATIENT)
Age: 70
End: 2025-03-11
Payer: MEDICARE

## 2025-03-11 VITALS
HEIGHT: 66 IN | SYSTOLIC BLOOD PRESSURE: 131 MMHG | WEIGHT: 181.2 LBS | BODY MASS INDEX: 29.12 KG/M2 | TEMPERATURE: 97.1 F | HEART RATE: 76 BPM | DIASTOLIC BLOOD PRESSURE: 81 MMHG | OXYGEN SATURATION: 99 % | RESPIRATION RATE: 16 BRPM

## 2025-03-11 DIAGNOSIS — E78.00 HYPERCHOLESTEREMIA: ICD-10-CM

## 2025-03-11 DIAGNOSIS — F33.42 RECURRENT MAJOR DEPRESSIVE DISORDER, IN FULL REMISSION: ICD-10-CM

## 2025-03-11 DIAGNOSIS — F41.1 GAD (GENERALIZED ANXIETY DISORDER): Primary | ICD-10-CM

## 2025-03-11 PROCEDURE — 99214 OFFICE O/P EST MOD 30 MIN: CPT | Performed by: INTERNAL MEDICINE

## 2025-03-11 PROCEDURE — 1036F TOBACCO NON-USER: CPT | Performed by: INTERNAL MEDICINE

## 2025-03-11 PROCEDURE — G8399 PT W/DXA RESULTS DOCUMENT: HCPCS | Performed by: INTERNAL MEDICINE

## 2025-03-11 PROCEDURE — G8427 DOCREV CUR MEDS BY ELIG CLIN: HCPCS | Performed by: INTERNAL MEDICINE

## 2025-03-11 PROCEDURE — 1159F MED LIST DOCD IN RCRD: CPT | Performed by: INTERNAL MEDICINE

## 2025-03-11 PROCEDURE — 3017F COLORECTAL CA SCREEN DOC REV: CPT | Performed by: INTERNAL MEDICINE

## 2025-03-11 PROCEDURE — 1123F ACP DISCUSS/DSCN MKR DOCD: CPT | Performed by: INTERNAL MEDICINE

## 2025-03-11 PROCEDURE — 1090F PRES/ABSN URINE INCON ASSESS: CPT | Performed by: INTERNAL MEDICINE

## 2025-03-11 PROCEDURE — 1160F RVW MEDS BY RX/DR IN RCRD: CPT | Performed by: INTERNAL MEDICINE

## 2025-03-11 PROCEDURE — G8417 CALC BMI ABV UP PARAM F/U: HCPCS | Performed by: INTERNAL MEDICINE

## 2025-03-11 PROCEDURE — 1126F AMNT PAIN NOTED NONE PRSNT: CPT | Performed by: INTERNAL MEDICINE

## 2025-03-11 RX ORDER — SERTRALINE HYDROCHLORIDE 100 MG/1
TABLET, FILM COATED ORAL
Qty: 12 TABLET | Refills: 0
Start: 2025-03-11 | End: 2025-03-23

## 2025-03-11 RX ORDER — VENLAFAXINE HYDROCHLORIDE 75 MG/1
CAPSULE, EXTENDED RELEASE ORAL
Qty: 30 CAPSULE | Refills: 1 | Status: SHIPPED | OUTPATIENT
Start: 2025-03-11

## 2025-03-11 SDOH — ECONOMIC STABILITY: FOOD INSECURITY: WITHIN THE PAST 12 MONTHS, THE FOOD YOU BOUGHT JUST DIDN'T LAST AND YOU DIDN'T HAVE MONEY TO GET MORE.: NEVER TRUE

## 2025-03-11 SDOH — ECONOMIC STABILITY: FOOD INSECURITY: WITHIN THE PAST 12 MONTHS, YOU WORRIED THAT YOUR FOOD WOULD RUN OUT BEFORE YOU GOT MONEY TO BUY MORE.: NEVER TRUE

## 2025-03-11 ASSESSMENT — PATIENT HEALTH QUESTIONNAIRE - PHQ9
5. POOR APPETITE OR OVEREATING: MORE THAN HALF THE DAYS
SUM OF ALL RESPONSES TO PHQ QUESTIONS 1-9: 12
7. TROUBLE CONCENTRATING ON THINGS, SUCH AS READING THE NEWSPAPER OR WATCHING TELEVISION: NEARLY EVERY DAY
6. FEELING BAD ABOUT YOURSELF - OR THAT YOU ARE A FAILURE OR HAVE LET YOURSELF OR YOUR FAMILY DOWN: NEARLY EVERY DAY
1. LITTLE INTEREST OR PLEASURE IN DOING THINGS: NOT AT ALL
SUM OF ALL RESPONSES TO PHQ QUESTIONS 1-9: 12
2. FEELING DOWN, DEPRESSED OR HOPELESS: SEVERAL DAYS
4. FEELING TIRED OR HAVING LITTLE ENERGY: SEVERAL DAYS
9. THOUGHTS THAT YOU WOULD BE BETTER OFF DEAD, OR OF HURTING YOURSELF: NOT AT ALL
10. IF YOU CHECKED OFF ANY PROBLEMS, HOW DIFFICULT HAVE THESE PROBLEMS MADE IT FOR YOU TO DO YOUR WORK, TAKE CARE OF THINGS AT HOME, OR GET ALONG WITH OTHER PEOPLE: SOMEWHAT DIFFICULT
3. TROUBLE FALLING OR STAYING ASLEEP: NOT AT ALL
SUM OF ALL RESPONSES TO PHQ QUESTIONS 1-9: 12
8. MOVING OR SPEAKING SO SLOWLY THAT OTHER PEOPLE COULD HAVE NOTICED. OR THE OPPOSITE, BEING SO FIGETY OR RESTLESS THAT YOU HAVE BEEN MOVING AROUND A LOT MORE THAN USUAL: MORE THAN HALF THE DAYS
SUM OF ALL RESPONSES TO PHQ QUESTIONS 1-9: 12

## 2025-03-11 ASSESSMENT — ANXIETY QUESTIONNAIRES
7. FEELING AFRAID AS IF SOMETHING AWFUL MIGHT HAPPEN: SEVERAL DAYS
1. FEELING NERVOUS, ANXIOUS, OR ON EDGE: MORE THAN HALF THE DAYS
4. TROUBLE RELAXING: NOT AT ALL
6. BECOMING EASILY ANNOYED OR IRRITABLE: NOT AT ALL
2. NOT BEING ABLE TO STOP OR CONTROL WORRYING: SEVERAL DAYS
GAD7 TOTAL SCORE: 5
5. BEING SO RESTLESS THAT IT IS HARD TO SIT STILL: NOT AT ALL
IF YOU CHECKED OFF ANY PROBLEMS ON THIS QUESTIONNAIRE, HOW DIFFICULT HAVE THESE PROBLEMS MADE IT FOR YOU TO DO YOUR WORK, TAKE CARE OF THINGS AT HOME, OR GET ALONG WITH OTHER PEOPLE: SOMEWHAT DIFFICULT
3. WORRYING TOO MUCH ABOUT DIFFERENT THINGS: SEVERAL DAYS

## 2025-03-11 NOTE — PROGRESS NOTES
Daria KAUFMAN [facility] is a 69 y.o. female who was seen in clinic today (3/11/2025).    Assessment & Plan:   Below is the assessment and plan developed based on review of pertinent history, physical exam, labs, studies, and medications.  Assessment & Plan  1. Depression.  Worsening and no obvious trigger. Will continue on Wellbutrin 300 mg and transition off Sertraline to Effexor. Will taper medication as below. Expectations and rational reviewed. Advise looking into a counselor and to continue stress-reducing activities. Taper sertraline: 200 mg to 150 mg for 3 days, 100 mg for 3 days, 50 mg for 3 days. If tapering too rapid and causing side effects will slow down to every 5 days taper. Start Effexor when she is down to 50 mg sertraline.    2. Anxiety.  Worsening, unclear why.  See treatment as above.    3. Cholesterol management.  Well controlled, possibly having side effects from medication. Recommended to discontinue medication for 1-2 months to assess impact on muscle aches. Will re-evaluate need for medication at that time.       Diagnoses/Orders:   1. ANDRIA (generalized anxiety disorder)  -     sertraline (ZOLOFT) 100 MG tablet; 1.5 tabs daily x3 days, then 1 tab daily x 3 days, then 1/2 tab daily x 3 days, then stop, Disp-12 tablet, R-0NO PRINT  -     venlafaxine (EFFEXOR XR) 75 MG extended release capsule; 1 tab PO daily.  Start once you have weaned down to sertraline 50mg., Disp-30 capsule, R-1Normal  2. Recurrent major depressive disorder, in full remission  -     sertraline (ZOLOFT) 100 MG tablet; 1.5 tabs daily x3 days, then 1 tab daily x 3 days, then 1/2 tab daily x 3 days, then stop, Disp-12 tablet, R-0NO PRINT  -     venlafaxine (EFFEXOR XR) 75 MG extended release capsule; 1 tab PO daily.  Start once you have weaned down to sertraline 50mg., Disp-30 capsule, R-1Normal  3. Hypercholesteremia     Return in about 6 weeks (around 4/22/2025) for follow up on mood.   Subjective/Objective:   Daria was seen today for

## 2025-04-04 ENCOUNTER — PATIENT MESSAGE (OUTPATIENT)
Age: 70
End: 2025-04-04

## 2025-04-04 DIAGNOSIS — F33.42 RECURRENT MAJOR DEPRESSIVE DISORDER, IN FULL REMISSION: ICD-10-CM

## 2025-04-04 DIAGNOSIS — F41.1 GAD (GENERALIZED ANXIETY DISORDER): ICD-10-CM

## 2025-04-04 DIAGNOSIS — G47.33 OSA (OBSTRUCTIVE SLEEP APNEA): Primary | ICD-10-CM

## 2025-04-04 RX ORDER — VENLAFAXINE HYDROCHLORIDE 75 MG/1
CAPSULE, EXTENDED RELEASE ORAL
Refills: 0 | OUTPATIENT
Start: 2025-04-04

## 2025-04-08 NOTE — TELEPHONE ENCOUNTER
Orders Placed This Encounter   Procedures    DME Order for (Specify) as OP     Diagnosis: (G47.33) FADY (obstructive sleep apnea)  (primary encounter diagnosis)     Replacement Supplies for Positive Airway Pressure Therapy Device:   Duration of need: 99 months.      Nasal Pillows Combo Mask (Replace) 2 per month.   Nasal Pillows (Replace) 2 per month.    Nasal Cushion (Replace) 2 per month.   Nasal Interface Mask 1 every 3 months.       Headgear 1 every 6 months.   Positive Airway Pressure chinstrap 1 every 6 months.     Tubing with heating element 1 every 3 months.     Filter(s) Disposable 2 per month.   Filter(s) Non-Disposable 1 every 6 months.   .    Water Chamber for Humidifier (Replace) 1 every 6 months.    VALENTINO Bettencourt NPI: [id number]    Electronically signed. Date:- 04/08/25     VALENTINO Bettencourt, [facility]   Nurse Practitioner  [facility] [place]

## 2025-04-10 ENCOUNTER — CLINICAL DOCUMENTATION (OUTPATIENT)
Age: 70
End: 2025-04-10

## 2025-04-14 ENCOUNTER — TELEMEDICINE (OUTPATIENT)
Age: 70
End: 2025-04-14
Payer: MEDICARE

## 2025-04-14 DIAGNOSIS — F41.1 GAD (GENERALIZED ANXIETY DISORDER): ICD-10-CM

## 2025-04-14 DIAGNOSIS — E78.00 HYPERCHOLESTEREMIA: ICD-10-CM

## 2025-04-14 DIAGNOSIS — F33.0 MILD EPISODE OF RECURRENT MAJOR DEPRESSIVE DISORDER: Primary | ICD-10-CM

## 2025-04-14 PROBLEM — G47.33 OSA (OBSTRUCTIVE SLEEP APNEA): Status: ACTIVE | Noted: 2025-04-14

## 2025-04-14 PROCEDURE — 1160F RVW MEDS BY RX/DR IN RCRD: CPT | Performed by: INTERNAL MEDICINE

## 2025-04-14 PROCEDURE — G8399 PT W/DXA RESULTS DOCUMENT: HCPCS | Performed by: INTERNAL MEDICINE

## 2025-04-14 PROCEDURE — G8427 DOCREV CUR MEDS BY ELIG CLIN: HCPCS | Performed by: INTERNAL MEDICINE

## 2025-04-14 PROCEDURE — 1090F PRES/ABSN URINE INCON ASSESS: CPT | Performed by: INTERNAL MEDICINE

## 2025-04-14 PROCEDURE — 99214 OFFICE O/P EST MOD 30 MIN: CPT | Performed by: INTERNAL MEDICINE

## 2025-04-14 PROCEDURE — G8417 CALC BMI ABV UP PARAM F/U: HCPCS | Performed by: INTERNAL MEDICINE

## 2025-04-14 PROCEDURE — 1036F TOBACCO NON-USER: CPT | Performed by: INTERNAL MEDICINE

## 2025-04-14 PROCEDURE — 1123F ACP DISCUSS/DSCN MKR DOCD: CPT | Performed by: INTERNAL MEDICINE

## 2025-04-14 PROCEDURE — 1159F MED LIST DOCD IN RCRD: CPT | Performed by: INTERNAL MEDICINE

## 2025-04-14 PROCEDURE — 3017F COLORECTAL CA SCREEN DOC REV: CPT | Performed by: INTERNAL MEDICINE

## 2025-04-14 RX ORDER — VENLAFAXINE HYDROCHLORIDE 75 MG/1
75 CAPSULE, EXTENDED RELEASE ORAL DAILY
Qty: 90 CAPSULE | Refills: 1 | Status: SHIPPED | OUTPATIENT
Start: 2025-04-14

## 2025-04-14 SDOH — ECONOMIC STABILITY: FOOD INSECURITY: WITHIN THE PAST 12 MONTHS, YOU WORRIED THAT YOUR FOOD WOULD RUN OUT BEFORE YOU GOT MONEY TO BUY MORE.: NEVER TRUE

## 2025-04-14 SDOH — ECONOMIC STABILITY: FOOD INSECURITY: WITHIN THE PAST 12 MONTHS, THE FOOD YOU BOUGHT JUST DIDN'T LAST AND YOU DIDN'T HAVE MONEY TO GET MORE.: NEVER TRUE

## 2025-04-14 ASSESSMENT — PATIENT HEALTH QUESTIONNAIRE - PHQ9
1. LITTLE INTEREST OR PLEASURE IN DOING THINGS: NOT AT ALL
SUM OF ALL RESPONSES TO PHQ QUESTIONS 1-9: 0
9. THOUGHTS THAT YOU WOULD BE BETTER OFF DEAD, OR OF HURTING YOURSELF: NOT AT ALL
SUM OF ALL RESPONSES TO PHQ QUESTIONS 1-9: 0
5. POOR APPETITE OR OVEREATING: NOT AT ALL
2. FEELING DOWN, DEPRESSED OR HOPELESS: NOT AT ALL
7. TROUBLE CONCENTRATING ON THINGS, SUCH AS READING THE NEWSPAPER OR WATCHING TELEVISION: NOT AT ALL
8. MOVING OR SPEAKING SO SLOWLY THAT OTHER PEOPLE COULD HAVE NOTICED. OR THE OPPOSITE, BEING SO FIGETY OR RESTLESS THAT YOU HAVE BEEN MOVING AROUND A LOT MORE THAN USUAL: NOT AT ALL
SUM OF ALL RESPONSES TO PHQ QUESTIONS 1-9: 0
SUM OF ALL RESPONSES TO PHQ QUESTIONS 1-9: 0
6. FEELING BAD ABOUT YOURSELF - OR THAT YOU ARE A FAILURE OR HAVE LET YOURSELF OR YOUR FAMILY DOWN: NOT AT ALL
3. TROUBLE FALLING OR STAYING ASLEEP: NOT AT ALL
4. FEELING TIRED OR HAVING LITTLE ENERGY: NOT AT ALL
10. IF YOU CHECKED OFF ANY PROBLEMS, HOW DIFFICULT HAVE THESE PROBLEMS MADE IT FOR YOU TO DO YOUR WORK, TAKE CARE OF THINGS AT HOME, OR GET ALONG WITH OTHER PEOPLE: NOT DIFFICULT AT ALL

## 2025-04-14 NOTE — PROGRESS NOTES
Daria Baez is a 69 y.o. female who was seen by synchronous (real-time) audio-video technology on 4/14/2025.      Assessment & Plan:   Below is the assessment and plan developed based on review of pertinent history, physical exam (if applicable), labs, studies, and medications.  Assessment & Plan  1. Depression.  - Reports significant improvement in depression symptoms since switching to venlafaxine 75 mg daily. Continue Wellbutrin. Will re-assess medications at follow up.    2. Anxiety.  - Anxiety symptoms have improved but are not completely resolved. She is happy with her control. The current medication regimen, including venlafaxine and Wellbutrin, will be continued.    3. Cholesterol management.  - Previously at goal, unclear control off statin. Discontinued rosuvastatin due to muscle aches and low energy, which have since improved. Advised to avoid fried, processed, and fast foods. A repeat lipid panel will be conducted in mid-May 2025 to reassess cholesterol levels and reassess if we need to start a different statin.        1. Mild episode of recurrent major depressive disorder  -     venlafaxine (EFFEXOR XR) 75 MG extended release capsule; Take 1 capsule by mouth daily, Disp-90 capsule, R-1Normal  2. ANDRIA (generalized anxiety disorder)  -     venlafaxine (EFFEXOR XR) 75 MG extended release capsule; Take 1 capsule by mouth daily, Disp-90 capsule, R-1Normal  3. Hypercholesteremia  -     Lipid Panel; Future     Return if symptoms worsen or fail to improve.    Future Appointments   Date Time Provider Department Center   6/17/2025  1:50 PM Alea Wilson, SUSI - NP [facility]   11/11/2025  8:40 AM Curt Steinberg MD [facility] DEP         Subjective:   Daria Baez was seen for Follow-up    History of Present Illness  The patient is here for a 1-month follow-up for anxiety and depression.    A significant improvement in overall well-being is reported following the transition from sertraline to

## 2025-05-27 ENCOUNTER — HOSPITAL ENCOUNTER (OUTPATIENT)
Facility: HOSPITAL | Age: 70
Discharge: HOME | End: 2025-05-30

## 2025-05-27 DIAGNOSIS — E78.00 HYPERCHOLESTEREMIA: ICD-10-CM

## 2025-05-27 LAB
CHOLEST SERPL-MCNC: 206 MG/DL
HDLC SERPL-MCNC: 61 MG/DL
HDLC SERPL: 3.4 (ref 0–5)
LDLC SERPL CALC-MCNC: 128 MG/DL (ref 0–100)
TRIGL SERPL-MCNC: 85 MG/DL
VLDLC SERPL CALC-MCNC: 17 MG/DL

## 2025-05-28 ENCOUNTER — RESULTS FOLLOW-UP (OUTPATIENT)
Age: 70
End: 2025-05-28

## 2025-05-28 NOTE — RESULT ENCOUNTER NOTE
Results released to patient via [employer].  Lipid panel done off statin.  The 10-year ASCVD risk score (Bala BERMAN, et al., 2019) is: 9.8%

## 2025-06-11 DIAGNOSIS — G47.33 OSA (OBSTRUCTIVE SLEEP APNEA): Primary | ICD-10-CM

## 2025-06-14 ASSESSMENT — SLEEP AND FATIGUE QUESTIONNAIRES
HOW LIKELY ARE YOU TO NOD OFF OR FALL ASLEEP WHILE WATCHING TV: MODERATE CHANCE OF DOZING
HOW LIKELY ARE YOU TO NOD OFF OR FALL ASLEEP WHILE LYING DOWN TO REST IN THE AFTERNOON WHEN CIRCUMSTANCES PERMIT: SLIGHT CHANCE OF DOZING
HAS YOUR RELATIONSHIP WITH FAMILY, FRIENDS OR WORK COLLEAGUES BEEN AFFECTED BECAUSE YOU ARE SLEEPY OR TIRED: NO
DO YOU HAVE DIFFICULTY WATCHING A MOVIE OR VIDEO BECAUSE YOU BECOME SLEEPY OR TIRED: YES, A LITTLE
DO YOU GENERALLY HAVE DIFFICULTY REMEMBERING THINGS BECAUSE YOU ARE SLEEPY OR TIRED: YES, MODERATE
DO YOU HAVE DIFFICULTY BEING AS ACTIVE AS YOU WANT TO BE IN THE MORNING BECAUSE YOU ARE SLEEPY OR TIRED: NO
HOW LIKELY ARE YOU TO NOD OFF OR FALL ASLEEP IN A CAR, WHILE STOPPED FOR A FEW MINUTES IN TRAFFIC: WOULD NEVER DOZE
DO YOU HAVE DIFFICULTY VISITING YOUR FAMILY OR FRIENDS IN THEIR HOME BECAUSE YOU BECOME SLEEPY OR TIRED: YES, A LITTLE
FOSQ SCORE: 14.5
HOW LIKELY ARE YOU TO NOD OFF OR FALL ASLEEP WHEN YOU ARE A PASSENGER IN A CAR FOR AN HOUR WITHOUT A BREAK: MODERATE CHANCE OF DOZING
DO YOU HAVE DIFFICULTY CONCENTRATING ON THE THINGS YOU DO BECAUSE YOU ARE SLEEPY OR TIRED: YES, A LITTLE
HOW LIKELY ARE YOU TO NOD OFF OR FALL ASLEEP WHILE SITTING INACTIVE IN A PUBLIC PLACE: SLIGHT CHANCE OF DOZING
HOW LIKELY ARE YOU TO NOD OFF OR FALL ASLEEP WHILE WATCHING TV: MODERATE CHANCE OF DOZING
DO YOU HAVE DIFFICULTY OPERATING A MOTOR VEHICLE FOR LONG DISTANCES (GREATER THAN 100 MILES) BECAUSE YOU BECOME SLEEPY: YES, MODERATE
ESS TOTAL SCORE: 9
HAS YOUR MOOD BEEN AFFECTED BECAUSE YOU ARE SLEEPY OR TIRED: YES, LITTLE
HOW LIKELY ARE YOU TO NOD OFF OR FALL ASLEEP WHILE LYING DOWN TO REST IN THE AFTERNOON WHEN CIRCUMSTANCES PERMIT: SLIGHT CHANCE OF DOZING
HOW LIKELY ARE YOU TO NOD OFF OR FALL ASLEEP WHILE SITTING INACTIVE IN A PUBLIC PLACE: SLIGHT CHANCE OF DOZING
HOW LIKELY ARE YOU TO NOD OFF OR FALL ASLEEP WHILE SITTING AND TALKING TO SOMEONE: WOULD NEVER DOZE
DO YOU HAVE DIFFICULTY BEING AS ACTIVE AS YOU WANT TO BE IN THE EVENING BECAUSE YOU ARE SLEEPY OR TIRED: YES, MODERATE
HOW LIKELY ARE YOU TO NOD OFF OR FALL ASLEEP WHILE SITTING QUIETLY AFTER LUNCH WITHOUT ALCOHOL: SLIGHT CHANCE OF DOZING
DO YOU HAVE DIFFICULTY OPERATING A MOTOR VEHICLE FOR SHORT DISTANCES (LESS THAN 100 MILES) BECAUSE YOU BECOME SLEEPY: YES, A LITTLE
HOW LIKELY ARE YOU TO NOD OFF OR FALL ASLEEP WHILE SITTING AND READING: MODERATE CHANCE OF DOZING
HOW LIKELY ARE YOU TO NOD OFF OR FALL ASLEEP WHILE SITTING AND READING: MODERATE CHANCE OF DOZING
HOW LIKELY ARE YOU TO NOD OFF OR FALL ASLEEP IN A CAR, WHILE STOPPED FOR A FEW MINUTES IN TRAFFIC: WOULD NEVER DOZE
HOW LIKELY ARE YOU TO NOD OFF OR FALL ASLEEP WHILE SITTING QUIETLY AFTER LUNCH WITHOUT ALCOHOL: SLIGHT CHANCE OF DOZING
HOW LIKELY ARE YOU TO NOD OFF OR FALL ASLEEP WHEN YOU ARE A PASSENGER IN A CAR FOR AN HOUR WITHOUT A BREAK: MODERATE CHANCE OF DOZING
HOW LIKELY ARE YOU TO NOD OFF OR FALL ASLEEP WHILE SITTING AND TALKING TO SOMEONE: WOULD NEVER DOZE

## 2025-06-17 ENCOUNTER — TELEMEDICINE (OUTPATIENT)
Age: 70
End: 2025-06-17
Payer: MEDICARE

## 2025-06-17 VITALS — BODY MASS INDEX: 29.25 KG/M2 | HEIGHT: 66 IN

## 2025-06-17 DIAGNOSIS — G47.33 OSA (OBSTRUCTIVE SLEEP APNEA): Primary | ICD-10-CM

## 2025-06-17 PROCEDURE — 99213 OFFICE O/P EST LOW 20 MIN: CPT | Performed by: NURSE PRACTITIONER

## 2025-06-17 NOTE — PATIENT INSTRUCTIONS
[address]  [place] [address]  Tel.  [phone]  Fax. [phone] [address]  [place] [address]  Tel.  [phone]  Fax. [phone] [address] [place] [address]  Tel.  [phone]  Fax. [phone]     Learning About CPAP for Sleep Apnea  What is CPAP?              CPAP is a small machine that you use at home every night while you sleep. It increases air pressure in your throat to keep your airway open. When you have sleep apnea, this can help you sleep better so you feel much better. CPAP stands for \"continuous positive airway pressure.\"  The CPAP machine will have one of the following:  A mask that covers your nose and mouth  Prongs that fit into your nose  A mask that covers your nose only, the most common type. This type is called NCPAP. The N stands for \"nasal.\"  Why is it done?  CPAP is usually the best treatment for obstructive sleep apnea. It is the first treatment choice and the most widely used. Your doctor may suggest CPAP if you have:  Moderate to severe sleep apnea.  Sleep apnea and coronary artery disease (CAD) or heart failure.  How does it help?  CPAP can help you have more normal sleep, so you feel less sleepy and more alert during the daytime.  CPAP may help keep heart failure or other heart problems from getting worse.  NCPAP may help lower your blood pressure.  If you use CPAP, your bed partner may also sleep better because you are not snoring or restless.  What are the side effects?  Some people who use CPAP have:  A dry or stuffy nose and a sore throat.  Irritated skin on the face.  Sore eyes.  Bloating.  If you have any of these problems, work with your doctor to fix them. Here are some things you can try:  Be sure the mask or nasal prongs fit well.  See if your doctor can adjust the pressure of your CPAP.  If your nose is dry, try a humidifier.  If your nose is runny or stuffy, try decongestant medicine or a steroid

## 2025-06-17 NOTE — PROGRESS NOTES
[address]   [place] [address]   Tel.  [phone]   Fax. [phone]  [address]   [place] [address]   Tel.  [phone]   Fax. [phone] [address] [place] [address]   Tel.  [phone]   Fax. [phone]     Daria Baez (: 1955) is a 68 y.o. female, established patient, seen for positive airway pressure follow-up, she was last seen by me on 2024, previously seen by Dr. Carrasquillo on 2019, prior notes reviewed in detail. Home sleep test 2018 showed AHI of 14.2/hr with a lowest SaO2 of 80%. device set up 3/5/2020. She is seen today for follow up.     ASSESSMENT/PLAN:   Diagnosis Orders   1. FADY (obstructive sleep apnea)  DME Order for (Specify) as OP      2. BMI 29.0-29.9,adult          AHI = 14.2 (2018). On Respironics APAP : 7-12 cmH2O. Set up 3/5/2020.     She is not compliant with PAP therapy although when used PAP shows benefit to the patient and remains necessary for control of her sleep apnea. There is continued clinical benefit from the hours of use demonstrated by AHI reduced from 14/hr to 4.6/hr.      No follow-up provider specified.    Sleep Apnea -  Continue on current pressures. She may take her mask off at night and forget to put it back on.     Orders Placed This Encounter   Procedures    DME Order for (Specify) as OP     Diagnosis: (G47.33) FADY (obstructive sleep apnea)  (primary encounter diagnosis)     Replacement Supplies for Positive Airway Pressure Therapy Device:   Duration of need: 99 months.      Nasal Pillows Combo Mask (Replace) 2 per month.   Nasal Pillows (Replace) 2 per month.    Nasal Cushion (Replace) 2 per month.   Nasal Interface Mask 1 every 3 months.       Headgear 1 every 6 months.   Positive Airway Pressure chinstrap 1 every 6 months.     Tubing with heating element 1 every 3 months.     Filter(s) Disposable 2 per month.   Filter(s)

## 2025-06-18 ENCOUNTER — CLINICAL DOCUMENTATION (OUTPATIENT)
Age: 70
End: 2025-06-18

## 2025-08-12 DIAGNOSIS — F41.1 GAD (GENERALIZED ANXIETY DISORDER): ICD-10-CM

## 2025-08-12 DIAGNOSIS — F33.0 MILD EPISODE OF RECURRENT MAJOR DEPRESSIVE DISORDER: ICD-10-CM

## 2025-08-12 RX ORDER — VENLAFAXINE HYDROCHLORIDE 150 MG/1
150 CAPSULE, EXTENDED RELEASE ORAL DAILY
Qty: 90 CAPSULE | Refills: 0 | Status: SHIPPED | OUTPATIENT
Start: 2025-08-12

## 2025-09-01 DIAGNOSIS — F33.0 MILD EPISODE OF RECURRENT MAJOR DEPRESSIVE DISORDER: ICD-10-CM

## 2025-09-01 DIAGNOSIS — F41.1 GAD (GENERALIZED ANXIETY DISORDER): ICD-10-CM

## 2025-09-03 RX ORDER — VENLAFAXINE HYDROCHLORIDE 150 MG/1
150 CAPSULE, EXTENDED RELEASE ORAL DAILY
Qty: 90 CAPSULE | Refills: 1 | Status: SHIPPED | OUTPATIENT
Start: 2025-09-03

## 2025-09-05 ENCOUNTER — TELEMEDICINE (OUTPATIENT)
Age: 70
End: 2025-09-05
Payer: MEDICARE

## 2025-09-05 DIAGNOSIS — F41.1 GAD (GENERALIZED ANXIETY DISORDER): ICD-10-CM

## 2025-09-05 DIAGNOSIS — F33.41 RECURRENT MAJOR DEPRESSIVE DISORDER, IN PARTIAL REMISSION: Primary | ICD-10-CM

## 2025-09-05 PROCEDURE — 99214 OFFICE O/P EST MOD 30 MIN: CPT | Performed by: INTERNAL MEDICINE

## 2025-09-05 ASSESSMENT — PATIENT HEALTH QUESTIONNAIRE - PHQ9
SUM OF ALL RESPONSES TO PHQ QUESTIONS 1-9: 2
SUM OF ALL RESPONSES TO PHQ QUESTIONS 1-9: 2
2. FEELING DOWN, DEPRESSED OR HOPELESS: SEVERAL DAYS
9. THOUGHTS THAT YOU WOULD BE BETTER OFF DEAD, OR OF HURTING YOURSELF: NOT AT ALL
10. IF YOU CHECKED OFF ANY PROBLEMS, HOW DIFFICULT HAVE THESE PROBLEMS MADE IT FOR YOU TO DO YOUR WORK, TAKE CARE OF THINGS AT HOME, OR GET ALONG WITH OTHER PEOPLE: SOMEWHAT DIFFICULT
5. POOR APPETITE OR OVEREATING: NOT AT ALL
3. TROUBLE FALLING OR STAYING ASLEEP: NOT AT ALL
8. MOVING OR SPEAKING SO SLOWLY THAT OTHER PEOPLE COULD HAVE NOTICED. OR THE OPPOSITE, BEING SO FIGETY OR RESTLESS THAT YOU HAVE BEEN MOVING AROUND A LOT MORE THAN USUAL: NOT AT ALL
1. LITTLE INTEREST OR PLEASURE IN DOING THINGS: NOT AT ALL
SUM OF ALL RESPONSES TO PHQ QUESTIONS 1-9: 2
7. TROUBLE CONCENTRATING ON THINGS, SUCH AS READING THE NEWSPAPER OR WATCHING TELEVISION: SEVERAL DAYS
6. FEELING BAD ABOUT YOURSELF - OR THAT YOU ARE A FAILURE OR HAVE LET YOURSELF OR YOUR FAMILY DOWN: NOT AT ALL
SUM OF ALL RESPONSES TO PHQ QUESTIONS 1-9: 2

## (undated) DEVICE — SCREW EXT FIX L14MM FOR DISTRCTN

## (undated) DEVICE — INSULATED BLADE ELECTRODE: Brand: EDGE

## (undated) DEVICE — DRAPE SURG W41XL74IN CLR FULL SZ C ARM 3 ADH POLY STRP E

## (undated) DEVICE — TOTAL TRAY, 16FR 10ML SIL FOLEY, URN: Brand: MEDLINE

## (undated) DEVICE — TOOL 14MH30 LEGEND 14CM 3MM: Brand: MIDAS REX ™

## (undated) DEVICE — MASTISOL ADHESIVE LIQ 2/3ML

## (undated) DEVICE — COVER,TABLE,HEAVY DUTY,60"X90",STRL: Brand: MEDLINE

## (undated) DEVICE — SOLUTION IRRIG 1000ML 0.9% SOD CHL USP POUR PLAS BTL

## (undated) DEVICE — BONE WAX WHITE: Brand: BONE WAX WHITE

## (undated) DEVICE — GLOVE SURG SZ 65 L12IN FNGR THK94MIL STD WHT LTX FREE

## (undated) DEVICE — FLOSEAL HEMOSTATIC MATRIX, 10ML: Brand: FLOSEAL HEMOSTATIC MATRIX

## (undated) DEVICE — BIPOLAR FORCEPS CORD: Brand: VALLEYLAB

## (undated) DEVICE — DRAPE,LAPAROTOMY,PED,STERILE: Brand: MEDLINE

## (undated) DEVICE — ANTERIOR CERVICAL-SMH: Brand: MEDLINE INDUSTRIES, INC.

## (undated) DEVICE — GLOVE SURG SZ 65 L12IN FNGR THK79MIL GRN LTX FREE

## (undated) DEVICE — GLOVE ORANGE PI 7 1/2   MSG9075

## (undated) DEVICE — STRAIGHT DRILL WITH SELF-CENTERING SLEEVE, 12MM

## (undated) DEVICE — SUTURE VCRL SZ 3-0 L18IN ABSRB UD CP-2 L26MM 1/2 CIR REV J761D

## (undated) DEVICE — SUTURE MCRYL SZ 4-0 L27IN ABSRB UD L19MM PS-2 1/2 CIR PRIM Y426H

## (undated) DEVICE — TUBING, SUCTION, 1/4" X 10', STRAIGHT: Brand: MEDLINE

## (undated) DEVICE — HANDLE LT SNAP ON ULT DURABLE LENS FOR TRUMPF ALC DISPOSABLE